# Patient Record
Sex: MALE | Race: WHITE | NOT HISPANIC OR LATINO | URBAN - METROPOLITAN AREA
[De-identification: names, ages, dates, MRNs, and addresses within clinical notes are randomized per-mention and may not be internally consistent; named-entity substitution may affect disease eponyms.]

---

## 2019-05-02 ENCOUNTER — OUTPATIENT (OUTPATIENT)
Dept: OUTPATIENT SERVICES | Facility: HOSPITAL | Age: 84
LOS: 1 days | Discharge: HOME | End: 2019-05-02

## 2019-05-02 ENCOUNTER — APPOINTMENT (OUTPATIENT)
Dept: CARDIOLOGY | Facility: CLINIC | Age: 84
End: 2019-05-02
Payer: MEDICARE

## 2019-05-02 ENCOUNTER — LABORATORY RESULT (OUTPATIENT)
Age: 84
End: 2019-05-02

## 2019-05-02 DIAGNOSIS — Z79.01 LONG TERM (CURRENT) USE OF ANTICOAGULANTS: ICD-10-CM

## 2019-05-02 DIAGNOSIS — Z01.810 ENCOUNTER FOR PREPROCEDURAL CARDIOVASCULAR EXAMINATION: ICD-10-CM

## 2019-05-02 DIAGNOSIS — I25.10 ATHEROSCLEROTIC HEART DISEASE OF NATIVE CORONARY ARTERY WITHOUT ANGINA PECTORIS: ICD-10-CM

## 2019-05-02 PROCEDURE — 93000 ELECTROCARDIOGRAM COMPLETE: CPT

## 2019-05-02 PROCEDURE — 99214 OFFICE O/P EST MOD 30 MIN: CPT

## 2019-05-28 ENCOUNTER — LABORATORY RESULT (OUTPATIENT)
Age: 84
End: 2019-05-28

## 2019-05-28 ENCOUNTER — OUTPATIENT (OUTPATIENT)
Dept: OUTPATIENT SERVICES | Facility: HOSPITAL | Age: 84
LOS: 1 days | Discharge: HOME | End: 2019-05-28

## 2019-05-28 DIAGNOSIS — Z01.810 ENCOUNTER FOR PREPROCEDURAL CARDIOVASCULAR EXAMINATION: ICD-10-CM

## 2019-05-28 DIAGNOSIS — E79.0 HYPERURICEMIA WITHOUT SIGNS OF INFLAMMATORY ARTHRITIS AND TOPHACEOUS DISEASE: ICD-10-CM

## 2019-05-28 DIAGNOSIS — I25.10 ATHEROSCLEROTIC HEART DISEASE OF NATIVE CORONARY ARTERY WITHOUT ANGINA PECTORIS: ICD-10-CM

## 2019-06-13 ENCOUNTER — LABORATORY RESULT (OUTPATIENT)
Age: 84
End: 2019-06-13

## 2019-06-13 ENCOUNTER — APPOINTMENT (OUTPATIENT)
Dept: CARDIOLOGY | Facility: CLINIC | Age: 84
End: 2019-06-13
Payer: MEDICARE

## 2019-06-13 ENCOUNTER — OUTPATIENT (OUTPATIENT)
Dept: OUTPATIENT SERVICES | Facility: HOSPITAL | Age: 84
LOS: 1 days | Discharge: HOME | End: 2019-06-13

## 2019-06-13 DIAGNOSIS — Z01.810 ENCOUNTER FOR PREPROCEDURAL CARDIOVASCULAR EXAMINATION: ICD-10-CM

## 2019-06-13 DIAGNOSIS — I25.10 ATHEROSCLEROTIC HEART DISEASE OF NATIVE CORONARY ARTERY WITHOUT ANGINA PECTORIS: ICD-10-CM

## 2019-06-13 PROCEDURE — 99214 OFFICE O/P EST MOD 30 MIN: CPT | Mod: 25

## 2019-06-13 PROCEDURE — 93306 TTE W/DOPPLER COMPLETE: CPT | Mod: 59

## 2019-06-13 PROCEDURE — 93000 ELECTROCARDIOGRAM COMPLETE: CPT | Mod: 59

## 2019-06-14 ENCOUNTER — EMERGENCY (EMERGENCY)
Facility: HOSPITAL | Age: 84
LOS: 0 days | Discharge: HOME | End: 2019-06-14
Attending: EMERGENCY MEDICINE | Admitting: EMERGENCY MEDICINE
Payer: MEDICARE

## 2019-06-14 VITALS
SYSTOLIC BLOOD PRESSURE: 137 MMHG | DIASTOLIC BLOOD PRESSURE: 61 MMHG | OXYGEN SATURATION: 98 % | TEMPERATURE: 98 F | RESPIRATION RATE: 17 BRPM | HEART RATE: 60 BPM

## 2019-06-14 VITALS
DIASTOLIC BLOOD PRESSURE: 59 MMHG | RESPIRATION RATE: 18 BRPM | SYSTOLIC BLOOD PRESSURE: 123 MMHG | HEART RATE: 74 BPM | OXYGEN SATURATION: 98 % | TEMPERATURE: 97 F

## 2019-06-14 DIAGNOSIS — R33.9 RETENTION OF URINE, UNSPECIFIED: ICD-10-CM
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
## 2019-06-14 DIAGNOSIS — T83.098A OTHER MECHANICAL COMPLICATION OF OTHER URINARY CATHETER, INITIAL ENCOUNTER: ICD-10-CM
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
## 2019-06-14 LAB
APPEARANCE UR: ABNORMAL
BILIRUB UR-MCNC: NEGATIVE — SIGNIFICANT CHANGE UP
COLOR SPEC: YELLOW — SIGNIFICANT CHANGE UP
DIFF PNL FLD: ABNORMAL
GLUCOSE UR QL: 100 MG/DL
KETONES UR-MCNC: NEGATIVE — SIGNIFICANT CHANGE UP
LEUKOCYTE ESTERASE UR-ACNC: NEGATIVE — SIGNIFICANT CHANGE UP
NITRITE UR-MCNC: NEGATIVE — SIGNIFICANT CHANGE UP
PH UR: 5.5 — SIGNIFICANT CHANGE UP (ref 5–8)
PROT UR-MCNC: NEGATIVE MG/DL — SIGNIFICANT CHANGE UP
RBC CASTS # UR COMP ASSIST: >50 /HPF
SP GR SPEC: 1.01 — SIGNIFICANT CHANGE UP (ref 1.01–1.03)
URATE CRY FLD QL MICRO: ABNORMAL /HPF
UROBILINOGEN FLD QL: 0.2 MG/DL — SIGNIFICANT CHANGE UP (ref 0.2–0.2)

## 2019-06-14 PROCEDURE — 99283 EMERGENCY DEPT VISIT LOW MDM: CPT | Mod: GC

## 2019-06-14 NOTE — ED PROVIDER NOTE - ATTENDING CONTRIBUTION TO CARE
Pt is a 89yo male with decreased urine output and increased suprapubic discomfort since last night.  Keller placed 1 week ago after TAVR.  No fever, no CP/SOB.  No other complaints.    Exam: osft NT abdomen after keller replaced, bruising in groin, RRR< CTAB, NAD  Imp: keller catheter malfunciton  Plan: replace keller, ua/ucx

## 2019-06-14 NOTE — ED PROVIDER NOTE - PHYSICAL EXAMINATION
Vital Signs: I have reviewed the initial vital signs.  Constitutional: NAD, well-nourished, appears stated age, no acute distress.  HEENT: Airway patent, moist MM, no erythema/swelling/deformity of oral structures. EOMI, PERRLA.  CV: regular rate, regular rhythm, well-perfused extremities, 2+ b/l DP and radial pulses equal.  Lungs: BCTA, no increased WOB.  ABD: suprapubic voluntary guarding, otherwise NTND, no guarding or rebound, no pulsatile mass, no hernias.   MSK: Neck supple, nontender, nl ROM, no stepoff. Chest nontender. Back nontender in TLS spine or to b/l bony structures or flanks. no cva ttp. Ext nontender, nl rom, no deformity.   INTEG: Skin warm, dry, no rash.  NEURO: A&Ox3, moving all extremities, normal speech  PSYCH: Calm, cooperative, normal affect and interaction.

## 2019-06-14 NOTE — ED PROVIDER NOTE - OBJECTIVE STATEMENT
89yM presents s/p TAVR last week c/by urinary retention with indwelling keller placed and retained prior to discharge. for the past 3 days pt has had progressive dysuria, suprapubic aching discomfort, and has had decreased output from the keller today. pain is constant nonradiating gradual onset, worse with attempting to void. no fever chills nausea back pain penile discharge hematuria. wife is acting as  per her request.

## 2019-06-14 NOTE — ED ADULT NURSE NOTE - OBJECTIVE STATEMENT
Patient present to ED BIBA with c/o leaking keller catheter and minimal output since yesterday. As per aide at bedside patient was discharged home saturday night and since then keller has had dark concentrated minimal output with leakage. Patient c/o lower pelvic pain on [ain scale 5/10 pressure in sensation. Old catheter removed, new 16F placed with 300 cc of yellow output.

## 2019-06-14 NOTE — ED ADULT NURSE NOTE - CHPI ED NUR SYMPTOMS NEG
no dysuria/no hematuria/no blood in stool/no chills/no abdominal distension/no nausea/no diarrhea/no fever

## 2019-06-14 NOTE — ED PROVIDER NOTE - NSFOLLOWUPINSTRUCTIONS_ED_ALL_ED_FT
Urinary Retention    Urinary retention is the inability to completely empty your bladder. This is a common problem in older men, especially with enlarged prostates. If you are sent home with a keller catheter and a drainage system make sure to keep the drainage bag emptied and lower than your catheter. Keep the keller catheter in until you follow up with a urologist.    SEEK IMMEDIATE MEDICAL CARE IF YOU DEVELOP THE FOLLOWING SYMPTOMS: the catheter stops draining urine, the catheter falls out, abdominal pain, nausea/vomiting, or chills/fever.

## 2019-06-15 LAB
CULTURE RESULTS: NO GROWTH — SIGNIFICANT CHANGE UP
SPECIMEN SOURCE: SIGNIFICANT CHANGE UP

## 2019-06-18 ENCOUNTER — APPOINTMENT (OUTPATIENT)
Dept: CARDIOLOGY | Facility: CLINIC | Age: 84
End: 2019-06-18
Payer: MEDICARE

## 2019-06-18 ENCOUNTER — OUTPATIENT (OUTPATIENT)
Dept: OUTPATIENT SERVICES | Facility: HOSPITAL | Age: 84
LOS: 1 days | Discharge: HOME | End: 2019-06-18
Payer: MEDICARE

## 2019-06-18 DIAGNOSIS — R07.9 CHEST PAIN, UNSPECIFIED: ICD-10-CM

## 2019-06-18 DIAGNOSIS — R06.02 SHORTNESS OF BREATH: ICD-10-CM

## 2019-06-18 PROCEDURE — 93000 ELECTROCARDIOGRAM COMPLETE: CPT

## 2019-06-18 PROCEDURE — 71046 X-RAY EXAM CHEST 2 VIEWS: CPT | Mod: 26

## 2019-06-18 PROCEDURE — 99214 OFFICE O/P EST MOD 30 MIN: CPT

## 2019-06-28 ENCOUNTER — APPOINTMENT (OUTPATIENT)
Dept: CARDIOLOGY | Facility: CLINIC | Age: 84
End: 2019-06-28
Payer: MEDICARE

## 2019-06-28 PROCEDURE — 99214 OFFICE O/P EST MOD 30 MIN: CPT

## 2019-06-28 PROCEDURE — 93000 ELECTROCARDIOGRAM COMPLETE: CPT

## 2019-07-24 ENCOUNTER — APPOINTMENT (OUTPATIENT)
Dept: CARDIOLOGY | Facility: CLINIC | Age: 84
End: 2019-07-24
Payer: MEDICARE

## 2019-07-24 PROCEDURE — 99214 OFFICE O/P EST MOD 30 MIN: CPT | Mod: 25

## 2019-07-24 PROCEDURE — 93000 ELECTROCARDIOGRAM COMPLETE: CPT

## 2019-09-11 ENCOUNTER — APPOINTMENT (OUTPATIENT)
Dept: CARDIOLOGY | Facility: CLINIC | Age: 84
End: 2019-09-11

## 2019-09-18 ENCOUNTER — APPOINTMENT (OUTPATIENT)
Dept: CARDIOLOGY | Facility: CLINIC | Age: 84
End: 2019-09-18
Payer: MEDICARE

## 2019-09-18 PROCEDURE — 99214 OFFICE O/P EST MOD 30 MIN: CPT | Mod: 25

## 2019-09-18 PROCEDURE — 93000 ELECTROCARDIOGRAM COMPLETE: CPT | Mod: 59

## 2019-09-18 PROCEDURE — 93280 PM DEVICE PROGR EVAL DUAL: CPT | Mod: 59

## 2019-12-17 ENCOUNTER — APPOINTMENT (OUTPATIENT)
Dept: CARDIOLOGY | Facility: CLINIC | Age: 84
End: 2019-12-17
Payer: MEDICARE

## 2019-12-17 PROCEDURE — 99214 OFFICE O/P EST MOD 30 MIN: CPT

## 2019-12-17 PROCEDURE — 93000 ELECTROCARDIOGRAM COMPLETE: CPT

## 2020-01-22 ENCOUNTER — APPOINTMENT (OUTPATIENT)
Dept: HEMATOLOGY ONCOLOGY | Facility: CLINIC | Age: 85
End: 2020-01-22

## 2020-01-23 ENCOUNTER — APPOINTMENT (OUTPATIENT)
Dept: CARDIOLOGY | Facility: CLINIC | Age: 85
End: 2020-01-23
Payer: MEDICARE

## 2020-01-23 PROCEDURE — 93000 ELECTROCARDIOGRAM COMPLETE: CPT

## 2020-01-23 PROCEDURE — 99214 OFFICE O/P EST MOD 30 MIN: CPT

## 2020-03-17 ENCOUNTER — APPOINTMENT (OUTPATIENT)
Dept: CARDIOLOGY | Facility: CLINIC | Age: 85
End: 2020-03-17

## 2020-03-24 ENCOUNTER — APPOINTMENT (OUTPATIENT)
Dept: CARDIOLOGY | Facility: CLINIC | Age: 85
End: 2020-03-24

## 2020-07-31 ENCOUNTER — RECORD ABSTRACTING (OUTPATIENT)
Age: 85
End: 2020-07-31

## 2020-07-31 DIAGNOSIS — Z86.79 PERSONAL HISTORY OF OTHER DISEASES OF THE CIRCULATORY SYSTEM: ICD-10-CM

## 2020-07-31 DIAGNOSIS — Z86.39 PERSONAL HISTORY OF OTHER ENDOCRINE, NUTRITIONAL AND METABOLIC DISEASE: ICD-10-CM

## 2020-07-31 DIAGNOSIS — I25.10 ATHEROSCLEROTIC HEART DISEASE OF NATIVE CORONARY ARTERY W/OUT ANGINA PECTORIS: ICD-10-CM

## 2020-07-31 DIAGNOSIS — Z78.9 OTHER SPECIFIED HEALTH STATUS: ICD-10-CM

## 2020-07-31 DIAGNOSIS — Z92.241 PERSONAL HISTORY OF SYSTEMIC STEROID THERAPY: ICD-10-CM

## 2020-08-14 ENCOUNTER — APPOINTMENT (OUTPATIENT)
Dept: CARDIOLOGY | Facility: CLINIC | Age: 85
End: 2020-08-14
Payer: MEDICARE

## 2020-08-14 VITALS
DIASTOLIC BLOOD PRESSURE: 70 MMHG | BODY MASS INDEX: 27.4 KG/M2 | SYSTOLIC BLOOD PRESSURE: 126 MMHG | WEIGHT: 185 LBS | HEIGHT: 69 IN

## 2020-08-14 PROCEDURE — 99213 OFFICE O/P EST LOW 20 MIN: CPT

## 2020-08-14 PROCEDURE — 93280 PM DEVICE PROGR EVAL DUAL: CPT

## 2020-08-14 PROCEDURE — 93000 ELECTROCARDIOGRAM COMPLETE: CPT

## 2020-08-14 NOTE — PROCEDURE
[NSR] : normal sinus rhythm [Pacemaker] : pacemaker [No] : not [DDD] : DDD [Threshold Testing Performed] : Threshold testing was performed [Voltage: ___ volts] : Voltage was [unfilled] volts [Outputs/Safety Margin] : output changed to allow for adequate safety margin [Lead Imp:  ___ohms] : lead impedance was [unfilled] ohms [Counters Reset] : the counters were reset [Apace-Vsense ___ %] : Apace-Vsense [unfilled]% [de-identified] : [place] [de-identified] : No events

## 2020-08-16 NOTE — REVIEW OF SYSTEMS
[Negative] : Endocrine [Blurry Vision] : no blurred vision [Seeing Double (Diplopia)] : no diplopia [Skin: A Rash] : no rash: [Anxiety] : no anxiety [Easy Bruising] : no tendency for easy bruising

## 2020-08-16 NOTE — HISTORY OF PRESENT ILLNESS
[FreeTextEntry1] : H/o CAD, s/p CABG at [facility] in 2011. \par \par H/o DM, HTN, hyperlipidemia. \par \par Progressive AS, s/p TAVR at [place] in May 2019, followed by PPM placement.\par \par Patient denies CP, SOB. His walking is limited by knee pain.

## 2020-08-16 NOTE — PHYSICAL EXAM
[General Appearance - Well Developed] : well developed [Normal Appearance] : normal appearance [Well Groomed] : well groomed [General Appearance - Well Nourished] : well nourished [No Deformities] : no deformities [Normal Conjunctiva] : the conjunctiva exhibited no abnormalities [General Appearance - In No Acute Distress] : no acute distress [Eyelids - No Xanthelasma] : the eyelids demonstrated no xanthelasmas [Respiration, Rhythm And Depth] : normal respiratory rhythm and effort [Auscultation Breath Sounds / Voice Sounds] : lungs were clear to auscultation bilaterally [Exaggerated Use Of Accessory Muscles For Inspiration] : no accessory muscle use [No Precordial Heave] : no precordial heave was noted [Normal] : normal [Normal Rate] : normal [Rhythm Regular] : regular [Normal S1] : normal S1 [Normal S2] : normal S2 [S4] : an S4 was heard [II] : a grade 2 [2+] : left 2+ [No Pitting Edema] : no pitting edema present [Bowel Sounds] : normal bowel sounds [Abdomen Soft] : soft [Abdomen Tenderness] : non-tender [Abdomen Mass (___ Cm)] : no abdominal mass palpated [Cyanosis, Localized] : no localized cyanosis [] : no rash [Skin Color & Pigmentation] : normal skin color and pigmentation [Oriented To Time, Place, And Person] : oriented to person, place, and time [Affect] : the affect was normal [Mood] : the mood was normal [S3] : no S3

## 2020-08-16 NOTE — DISCUSSION/SUMMARY
[FreeTextEntry1] : 89-yo male, s/p CABG, s/p TAVR, s/p PPM. HTN is controlled  now. No angina on current therapy.\par \par Continue treatment.\par Repeat BW prior to next visit.\par EP f/u for PPM management.\par F/u in 4 months.\par \par Eleazar Nguyễn MD\par

## 2020-11-05 ENCOUNTER — APPOINTMENT (OUTPATIENT)
Dept: CARDIOLOGY | Facility: CLINIC | Age: 85
End: 2020-11-05
Payer: MEDICARE

## 2020-11-05 VITALS
SYSTOLIC BLOOD PRESSURE: 140 MMHG | WEIGHT: 186 LBS | HEART RATE: 60 BPM | TEMPERATURE: 97.7 F | HEIGHT: 69 IN | BODY MASS INDEX: 27.55 KG/M2 | DIASTOLIC BLOOD PRESSURE: 70 MMHG

## 2020-11-05 DIAGNOSIS — Z00.00 ENCOUNTER FOR GENERAL ADULT MEDICAL EXAMINATION W/OUT ABNORMAL FINDINGS: ICD-10-CM

## 2020-11-05 PROCEDURE — 93000 ELECTROCARDIOGRAM COMPLETE: CPT

## 2020-11-05 PROCEDURE — 99214 OFFICE O/P EST MOD 30 MIN: CPT

## 2020-11-05 NOTE — PHYSICAL EXAM
[General Appearance - Well Developed] : well developed [Normal Appearance] : normal appearance [Well Groomed] : well groomed [General Appearance - Well Nourished] : well nourished [No Deformities] : no deformities [General Appearance - In No Acute Distress] : no acute distress [Normal Conjunctiva] : the conjunctiva exhibited no abnormalities [Eyelids - No Xanthelasma] : the eyelids demonstrated no xanthelasmas [Respiration, Rhythm And Depth] : normal respiratory rhythm and effort [Exaggerated Use Of Accessory Muscles For Inspiration] : no accessory muscle use [Auscultation Breath Sounds / Voice Sounds] : lungs were clear to auscultation bilaterally [Normal] : normal [No Precordial Heave] : no precordial heave was noted [Normal Rate] : normal [Rhythm Regular] : regular [Normal S1] : normal S1 [Normal S2] : normal S2 [S3] : no S3 [S4] : an S4 was heard [II] : a grade 2 [2+] : left 2+ [No Pitting Edema] : no pitting edema present [Bowel Sounds] : normal bowel sounds [Abdomen Soft] : soft [Abdomen Tenderness] : non-tender [Abdomen Mass (___ Cm)] : no abdominal mass palpated [Cyanosis, Localized] : no localized cyanosis [Skin Color & Pigmentation] : normal skin color and pigmentation [] : no rash [Oriented To Time, Place, And Person] : oriented to person, place, and time [Affect] : the affect was normal [Mood] : the mood was normal

## 2020-11-05 NOTE — HISTORY OF PRESENT ILLNESS
[FreeTextEntry1] : H/o CAD, s/p CABG at [facility] in 2011. \par \par H/o DM, HTN, hyperlipidemia. \par \par Progressive AS, s/p TAVR at [place] in May 2019, followed by PPM placement.\par \par Patient has experienced several episodes of severe left-sided chest pain with activity in his apartment but not while walking outside (walks every day).

## 2020-11-05 NOTE — ASSESSMENT
[FreeTextEntry1] : CAD, s/p CABG, patent grafts prior to TAVR.\par Episodes of chest pain, unclear if cardiac.\par HTN.\par S/p TAVR.\par Diastolic dysfunction, appears euvolemic.\par \par Plan:\par Continue treatment.\par Patient advised to take SL NItro when he experiences CP. Will need ischemic w/u if responds to Nitro.\par Will schedule BW with PMD.\par F/u in 2 months.\par \par Eleazar Nguyễn MD\par

## 2020-11-05 NOTE — REVIEW OF SYSTEMS
[Blurry Vision] : no blurred vision [Seeing Double (Diplopia)] : no diplopia [see HPI] : see HPI [Lower Ext Edema] : no extremity edema [Leg Claudication] : no intermittent leg claudication [Palpitations] : no palpitations [Skin: A Rash] : no rash: [Anxiety] : no anxiety [Easy Bruising] : no tendency for easy bruising [Negative] : Endocrine

## 2020-11-12 ENCOUNTER — APPOINTMENT (OUTPATIENT)
Dept: CARDIOLOGY | Facility: CLINIC | Age: 85
End: 2020-11-12
Payer: MEDICARE

## 2020-11-12 VITALS
HEIGHT: 69 IN | SYSTOLIC BLOOD PRESSURE: 120 MMHG | BODY MASS INDEX: 27.55 KG/M2 | DIASTOLIC BLOOD PRESSURE: 68 MMHG | WEIGHT: 186 LBS

## 2020-11-12 PROCEDURE — 93280 PM DEVICE PROGR EVAL DUAL: CPT

## 2020-11-12 RX ORDER — RANITIDINE HYDROCHLORIDE 150 MG/1
150 CAPSULE ORAL
Refills: 0 | Status: COMPLETED | COMMUNITY
End: 2020-11-12

## 2020-11-12 RX ORDER — CARBIDOPA AND LEVODOPA 25; 100 MG/1; MG/1
25-100 TABLET ORAL
Qty: 90 | Refills: 0 | Status: COMPLETED | COMMUNITY
Start: 2020-04-06 | End: 2020-11-12

## 2020-11-12 RX ORDER — FUROSEMIDE 20 MG/1
20 TABLET ORAL DAILY
Qty: 90 | Refills: 1 | Status: COMPLETED | COMMUNITY
End: 2020-11-12

## 2020-11-12 RX ORDER — FAMOTIDINE 40 MG/1
40 TABLET, FILM COATED ORAL
Qty: 30 | Refills: 0 | Status: COMPLETED | COMMUNITY
Start: 2020-06-16 | End: 2020-11-12

## 2020-12-27 NOTE — HISTORY OF PRESENT ILLNESS
[FreeTextEntry1] : H/o CAD, s/p CABG at [facility] in 2011. \par \par H/o DM, HTN, hyperlipidemia. \par \par Progressive AS, s/p TAVR at [place] in May 2019, followed by PPM placement.\par \par Patient  without complains

## 2020-12-27 NOTE — PROCEDURE
[See Scanned Paceart Report] : See scanned paceart report [See Device Printout] : See device printout [Pacemaker] : pacemaker [Programmed for Longevity] : output reprogrammed for improved battery longevity [de-identified] : [employer] [place]

## 2021-01-08 ENCOUNTER — APPOINTMENT (OUTPATIENT)
Dept: CARDIOLOGY | Facility: CLINIC | Age: 86
End: 2021-01-08
Payer: MEDICARE

## 2021-01-08 VITALS
HEIGHT: 69 IN | SYSTOLIC BLOOD PRESSURE: 130 MMHG | BODY MASS INDEX: 28.14 KG/M2 | WEIGHT: 190 LBS | DIASTOLIC BLOOD PRESSURE: 78 MMHG

## 2021-01-08 PROCEDURE — 93000 ELECTROCARDIOGRAM COMPLETE: CPT

## 2021-01-08 PROCEDURE — 99213 OFFICE O/P EST LOW 20 MIN: CPT

## 2021-01-10 NOTE — REVIEW OF SYSTEMS
[Blurry Vision] : no blurred vision [Seeing Double (Diplopia)] : no diplopia [see HPI] : see HPI [Lower Ext Edema] : no extremity edema [Palpitations] : no palpitations [Leg Claudication] : no intermittent leg claudication [Skin: A Rash] : no rash: [Anxiety] : no anxiety [Easy Bruising] : no tendency for easy bruising [Negative] : Endocrine

## 2021-01-10 NOTE — HISTORY OF PRESENT ILLNESS
[FreeTextEntry1] : H/o CAD, s/p CABG at [facility] in 2011. \par \par H/o DM, HTN, hyperlipidemia. \par \par Progressive AS, s/p TAVR at [place] in May 2019, followed by PPM placement.\par \par Patient denies recent episodes of chest pain, dizziness. BP is occasionally elevated in the morning.

## 2021-01-10 NOTE — ASSESSMENT
[FreeTextEntry1] : CAD, s/p CABG, patent grafts prior to TAVR.\par HTN well controlled.\par S/p TAVR.\par Diastolic dysfunction, appears euvolemic.\par \par Plan:\par Continue treatment.\par Will schedule BW with PMD.\par F/u in 4 months.\par \par Eleazar Nguyễn MD\par

## 2021-01-10 NOTE — PHYSICAL EXAM
[General Appearance - Well Developed] : well developed [Normal Appearance] : normal appearance [Well Groomed] : well groomed [General Appearance - Well Nourished] : well nourished [No Deformities] : no deformities [General Appearance - In No Acute Distress] : no acute distress [Normal Conjunctiva] : the conjunctiva exhibited no abnormalities [Eyelids - No Xanthelasma] : the eyelids demonstrated no xanthelasmas [Respiration, Rhythm And Depth] : normal respiratory rhythm and effort [Auscultation Breath Sounds / Voice Sounds] : lungs were clear to auscultation bilaterally [Exaggerated Use Of Accessory Muscles For Inspiration] : no accessory muscle use [Normal] : normal [No Precordial Heave] : no precordial heave was noted [Normal Rate] : normal [Rhythm Regular] : regular [Normal S1] : normal S1 [Normal S2] : normal S2 [S3] : no S3 [S4] : an S4 was heard [II] : a grade 2 [2+] : left 2+ [No Pitting Edema] : no pitting edema present [Bowel Sounds] : normal bowel sounds [Abdomen Soft] : soft [Abdomen Tenderness] : non-tender [Abdomen Mass (___ Cm)] : no abdominal mass palpated [Cyanosis, Localized] : no localized cyanosis [Skin Color & Pigmentation] : normal skin color and pigmentation [] : no rash [Oriented To Time, Place, And Person] : oriented to person, place, and time [Affect] : the affect was normal [Mood] : the mood was normal

## 2021-01-28 ENCOUNTER — APPOINTMENT (OUTPATIENT)
Dept: CARDIOLOGY | Facility: CLINIC | Age: 86
End: 2021-01-28

## 2021-02-09 NOTE — PROCEDURE
[No] : not [NSR] : normal sinus rhythm [Pacemaker] : pacemaker [DDD] : DDD [Voltage: ___ volts] : Voltage was [unfilled] volts [Threshold Testing Performed] : Threshold testing was performed [Lead Imp:  ___ohms] : lead impedance was [unfilled] ohms [Outputs/Safety Margin] : output changed to allow for adequate safety margin [Counters Reset] : the counters were reset [Apace-Vsense ___ %] : Apace-Vsense [unfilled]% [de-identified] : [place] [de-identified] : No events

## 2021-02-10 ENCOUNTER — APPOINTMENT (OUTPATIENT)
Dept: CARDIOLOGY | Facility: CLINIC | Age: 86
End: 2021-02-10

## 2021-02-16 ENCOUNTER — APPOINTMENT (OUTPATIENT)
Dept: CARDIOLOGY | Facility: CLINIC | Age: 86
End: 2021-02-16
Payer: MEDICARE

## 2021-03-05 ENCOUNTER — APPOINTMENT (OUTPATIENT)
Dept: CARDIOLOGY | Facility: CLINIC | Age: 86
End: 2021-03-05
Payer: MEDICARE

## 2021-03-05 ENCOUNTER — NON-APPOINTMENT (OUTPATIENT)
Age: 86
End: 2021-03-05

## 2021-03-05 PROCEDURE — 93294 REM INTERROG EVL PM/LDLS PM: CPT

## 2021-03-05 PROCEDURE — 93296 REM INTERROG EVL PM/IDS: CPT

## 2021-05-06 ENCOUNTER — APPOINTMENT (OUTPATIENT)
Dept: CARDIOLOGY | Facility: CLINIC | Age: 86
End: 2021-05-06
Payer: MEDICARE

## 2021-05-06 VITALS
OXYGEN SATURATION: 98 % | DIASTOLIC BLOOD PRESSURE: 70 MMHG | TEMPERATURE: 97.8 F | HEIGHT: 69 IN | BODY MASS INDEX: 27.25 KG/M2 | HEART RATE: 61 BPM | SYSTOLIC BLOOD PRESSURE: 122 MMHG | WEIGHT: 184 LBS

## 2021-05-06 DIAGNOSIS — R06.00 DYSPNEA, UNSPECIFIED: ICD-10-CM

## 2021-05-06 PROCEDURE — 93000 ELECTROCARDIOGRAM COMPLETE: CPT | Mod: 59

## 2021-05-06 PROCEDURE — 93280 PM DEVICE PROGR EVAL DUAL: CPT

## 2021-05-06 PROCEDURE — 99213 OFFICE O/P EST LOW 20 MIN: CPT | Mod: 25

## 2021-05-06 RX ORDER — AMLODIPINE BESYLATE 5 MG/1
5 TABLET ORAL
Qty: 90 | Refills: 1 | Status: DISCONTINUED | COMMUNITY
Start: 2021-01-08 | End: 2021-05-06

## 2021-05-06 NOTE — HISTORY OF PRESENT ILLNESS
[FreeTextEntry1] : H/o CAD, s/p CABG at [facility] in 2011. \par \par H/o DM, HTN, hyperlipidemia. \par \par Progressive AS, s/p TAVR at [place] in May 2019, followed by PPM placement.\par \par Patient c/o GARCIA since TAVR implant.

## 2021-05-06 NOTE — PROCEDURE
[No] : not [NSR] : normal sinus rhythm [Pacemaker] : pacemaker [DDD] : DDD [Voltage: ___ volts] : Voltage was [unfilled] volts [Magnet Rate: ___ Ppm] : magnet rate was [unfilled] Ppm [Normal] : The battery status is normal. [Lead Imp:  ___ohms] : lead impedance was [unfilled] ohms [Sensing Amplitude ___mv] : sensing amplitude was [unfilled] mv [___V @] : [unfilled] V [___ ms] : [unfilled] ms [Asense-Vpace ___ %] : Asense-Vpace [unfilled]% [Apace-Vsense ___ %] : Apace-Vsense [unfilled]% [de-identified] : [place] [de-identified] : [id number] [de-identified] : [id number] [de-identified] : 6/4/2019 [de-identified] : 60/130 [de-identified] : 9.2-10.5 years [de-identified] : Rate response turned on [de-identified] : No new episodes

## 2021-05-06 NOTE — PHYSICAL EXAM
[General Appearance - Well Developed] : well developed [Normal Appearance] : normal appearance [Well Groomed] : well groomed [General Appearance - Well Nourished] : well nourished [No Deformities] : no deformities [General Appearance - In No Acute Distress] : no acute distress [Heart Rate And Rhythm] : heart rate and rhythm were normal [Heart Sounds] : normal S1 and S2 [Murmurs] : no murmurs present [] : no respiratory distress [Respiration, Rhythm And Depth] : normal respiratory rhythm and effort [Exaggerated Use Of Accessory Muscles For Inspiration] : no accessory muscle use [Auscultation Breath Sounds / Voice Sounds] : lungs were clear to auscultation bilaterally [Left Infraclavicular] : left infraclavicular area [Well-Healed] : well-healed [Abdomen Soft] : soft [Nail Clubbing] : no clubbing of the fingernails [Cyanosis, Localized] : no localized cyanosis [FreeTextEntry1] : Mechanical valve ausculated

## 2021-05-06 NOTE — ASSESSMENT
[FreeTextEntry1] : DC PPM interrogation\par - Device function normal. Parameters stable. \par - Patient c/o GARCIA, heart rate histograms reveal HR 60, A pacing 79%, mode changed from DDD to DDDR\par - Patient enrolled in remote monitoring and transmitting\par \par

## 2021-05-13 ENCOUNTER — APPOINTMENT (OUTPATIENT)
Dept: CARDIOLOGY | Facility: CLINIC | Age: 86
End: 2021-05-13
Payer: MEDICARE

## 2021-05-13 ENCOUNTER — RESULT CHARGE (OUTPATIENT)
Age: 86
End: 2021-05-13

## 2021-05-13 VITALS
WEIGHT: 186 LBS | BODY MASS INDEX: 27.55 KG/M2 | SYSTOLIC BLOOD PRESSURE: 130 MMHG | DIASTOLIC BLOOD PRESSURE: 74 MMHG | HEIGHT: 69 IN

## 2021-05-13 PROCEDURE — 99213 OFFICE O/P EST LOW 20 MIN: CPT

## 2021-05-13 PROCEDURE — 93000 ELECTROCARDIOGRAM COMPLETE: CPT

## 2021-05-13 NOTE — ASSESSMENT
[FreeTextEntry1] : CAD, s/p CABG, patent grafts prior to TAVR.\par HTN well controlled.\par S/p TAVR. Increased FRANKO at the AV now.\par Diastolic dysfunction, appears euvolemic.\par Hyperlipidemia.\par \par Plan:\par Continue treatment.\par Repeat 2D ECHO.\par BW from PMD.\par F/u in 4 months.\par \par Eleazar Nguyễn MD\par

## 2021-05-13 NOTE — REVIEW OF SYSTEMS
[Lower Ext Edema] : no extremity edema [Leg Claudication] : no intermittent leg claudication [Palpitations] : no palpitations [Syncope] : no syncope [Rash] : no rash [Anxiety] : no anxiety [Easy Bleeding] : no tendency for easy bleeding [Easy Bruising] : no tendency for easy bruising [Negative] : Neurological

## 2021-05-13 NOTE — PHYSICAL EXAM
[Well Developed] : well developed [Well Nourished] : well nourished [No Acute Distress] : no acute distress [Normal Conjunctiva] : normal conjunctiva [Normal Venous Pressure] : normal venous pressure [No Carotid Bruit] : no carotid bruit [Normal Rate] : normal [Rhythm Regular] : regular [Normal S1] : normal S1 [Normal S2] : normal S2 [S3] : no S3 [S4] : an S4 was heard [III] : a grade 3 [Crescendo-Decrescendo] : crescendo-decrescendo [No Pitting Edema] : no pitting edema present [Clear Lung Fields] : clear lung fields [Good Air Entry] : good air entry [No Respiratory Distress] : no respiratory distress  [Soft] : abdomen soft [Non Tender] : non-tender [Normal Bowel Sounds] : normal bowel sounds [Normal Gait] : normal gait [No Edema] : no edema [No Cyanosis] : no cyanosis [No Clubbing] : no clubbing [No Varicosities] : no varicosities [No Rash] : no rash [Moves all extremities] : moves all extremities [No Focal Deficits] : no focal deficits [Normal Speech] : normal speech [Alert and Oriented] : alert and oriented [Normal memory] : normal memory

## 2021-05-13 NOTE — HISTORY OF PRESENT ILLNESS
[FreeTextEntry1] : H/o CAD, s/p CABG at [facility] in 2011. \par \par H/o DM, HTN, hyperlipidemia. \par \par Progressive AS, s/p TAVR at [place] in May 2019, followed by PPM placement.\par \par Patient still c/o chest pain with some house work but he is able to walk outside without difficulty\par \par BP has been better controlled.

## 2021-05-28 ENCOUNTER — APPOINTMENT (OUTPATIENT)
Dept: CARDIOLOGY | Facility: CLINIC | Age: 86
End: 2021-05-28

## 2021-06-15 ENCOUNTER — APPOINTMENT (OUTPATIENT)
Dept: CARDIOLOGY | Facility: CLINIC | Age: 86
End: 2021-06-15
Payer: MEDICARE

## 2021-06-15 PROCEDURE — 93306 TTE W/DOPPLER COMPLETE: CPT

## 2021-08-06 ENCOUNTER — APPOINTMENT (OUTPATIENT)
Dept: CARDIOLOGY | Facility: CLINIC | Age: 86
End: 2021-08-06
Payer: MEDICARE

## 2021-08-06 ENCOUNTER — NON-APPOINTMENT (OUTPATIENT)
Age: 86
End: 2021-08-06

## 2021-08-06 PROCEDURE — 93296 REM INTERROG EVL PM/IDS: CPT

## 2021-08-06 PROCEDURE — 93294 REM INTERROG EVL PM/LDLS PM: CPT

## 2021-08-12 ENCOUNTER — RESULT CHARGE (OUTPATIENT)
Age: 86
End: 2021-08-12

## 2021-08-12 ENCOUNTER — APPOINTMENT (OUTPATIENT)
Dept: CARDIOLOGY | Facility: CLINIC | Age: 86
End: 2021-08-12
Payer: MEDICARE

## 2021-08-12 VITALS
DIASTOLIC BLOOD PRESSURE: 70 MMHG | RESPIRATION RATE: 16 BRPM | SYSTOLIC BLOOD PRESSURE: 134 MMHG | OXYGEN SATURATION: 98 % | BODY MASS INDEX: 27.7 KG/M2 | WEIGHT: 187 LBS | TEMPERATURE: 97.8 F | HEART RATE: 60 BPM | HEIGHT: 69 IN

## 2021-08-12 PROCEDURE — 93000 ELECTROCARDIOGRAM COMPLETE: CPT

## 2021-08-12 PROCEDURE — 99214 OFFICE O/P EST MOD 30 MIN: CPT

## 2021-08-12 NOTE — PHYSICAL EXAM
[Well Developed] : well developed [Well Nourished] : well nourished [No Acute Distress] : no acute distress [Normal Conjunctiva] : normal conjunctiva [Normal Venous Pressure] : normal venous pressure [No Carotid Bruit] : no carotid bruit [Normal Rate] : normal [Rhythm Regular] : regular [Normal S1] : normal S1 [Normal S2] : normal S2 [S4] : an S4 was heard [III] : a grade 3 [Crescendo-Decrescendo] : crescendo-decrescendo [No Pitting Edema] : no pitting edema present [Clear Lung Fields] : clear lung fields [Good Air Entry] : good air entry [No Respiratory Distress] : no respiratory distress  [Soft] : abdomen soft [Non Tender] : non-tender [Normal Bowel Sounds] : normal bowel sounds [Normal Gait] : normal gait [No Edema] : no edema [No Cyanosis] : no cyanosis [No Clubbing] : no clubbing [No Varicosities] : no varicosities [No Rash] : no rash [Moves all extremities] : moves all extremities [No Focal Deficits] : no focal deficits [Normal Speech] : normal speech [Alert and Oriented] : alert and oriented [Normal memory] : normal memory [S3] : no S3

## 2021-08-12 NOTE — ASSESSMENT
[FreeTextEntry1] : CAD, s/p CABG, patent grafts prior to TAVR.\par HTN uncontrolled again due to running out of Amlodipine..\par S/p TAVR. Increased FRANKO at the AV now.\par Diastolic dysfunction, appears euvolemic.\par Hyperlipidemia.\par Dizziness, headache, ? side effect of Ranexa.\par \par Plan:\par Resume Amlodipine.\par Stop Ranexa for now.\par BW pending.\par F/u in 3 months.\par \par Eleazar Nguyễn MD\par

## 2021-08-12 NOTE — HISTORY OF PRESENT ILLNESS
[FreeTextEntry1] : H/o CAD, s/p CABG at [facility] in 2011. \par \par H/o DM, HTN, hyperlipidemia. \par \par Progressive AS, s/p TAVR at [place] in May 2019, followed by PPM placement.\par \par Patient denies CP now.\par \par BP has been uncontrolled again. He c/o frequent headache and dizziness.

## 2021-08-12 NOTE — CARDIOLOGY SUMMARY
[de-identified] : 08/12/21:\par SR, 1st degree AVB, LBBB. [de-identified] : 06/15/21:\par LVEF 64%, G2DD\par S/p TAVR, normal function, PG 23/16\par Mild MR, TR.

## 2021-08-12 NOTE — REVIEW OF SYSTEMS
[Negative] : Gastrointestinal [Headache] : headache [Lower Ext Edema] : no extremity edema [Leg Claudication] : no intermittent leg claudication [Palpitations] : no palpitations [Syncope] : no syncope [Rash] : no rash [Dizziness] : dizziness [Tremor] : a tremor was seen [Numbness (Hypoesthesia)] : no numbness [Speech Disturbance] : no speech disturbance [Anxiety] : no anxiety [Easy Bleeding] : no tendency for easy bleeding [Easy Bruising] : no tendency for easy bruising

## 2021-11-05 ENCOUNTER — APPOINTMENT (OUTPATIENT)
Dept: CARDIOLOGY | Facility: CLINIC | Age: 86
End: 2021-11-05
Payer: MEDICARE

## 2021-11-05 ENCOUNTER — NON-APPOINTMENT (OUTPATIENT)
Age: 86
End: 2021-11-05

## 2021-11-05 PROCEDURE — 93294 REM INTERROG EVL PM/LDLS PM: CPT

## 2021-11-05 PROCEDURE — 93296 REM INTERROG EVL PM/IDS: CPT

## 2021-12-09 ENCOUNTER — APPOINTMENT (OUTPATIENT)
Dept: CARDIOLOGY | Facility: CLINIC | Age: 86
End: 2021-12-09
Payer: MEDICARE

## 2021-12-09 VITALS
HEIGHT: 69 IN | BODY MASS INDEX: 26.36 KG/M2 | SYSTOLIC BLOOD PRESSURE: 122 MMHG | DIASTOLIC BLOOD PRESSURE: 70 MMHG | WEIGHT: 178 LBS

## 2021-12-09 PROCEDURE — 93000 ELECTROCARDIOGRAM COMPLETE: CPT

## 2021-12-09 PROCEDURE — 99214 OFFICE O/P EST MOD 30 MIN: CPT

## 2021-12-09 RX ORDER — RANOLAZINE 500 MG/1
500 TABLET, FILM COATED, EXTENDED RELEASE ORAL
Qty: 180 | Refills: 1 | Status: DISCONTINUED | COMMUNITY
End: 2021-12-09

## 2021-12-09 NOTE — HISTORY OF PRESENT ILLNESS
[FreeTextEntry1] : H/o CAD, s/p CABG at [facility] in 2011. \par \par H/o DM, HTN, hyperlipidemia. \par \par Progressive AS, s/p TAVR at [place] in May 2019, followed by PPM placement.\par \par Patient denies CP now.\par \par BP has been controlled but patient feels weak and dizzy.

## 2021-12-09 NOTE — REVIEW OF SYSTEMS
[Headache] : headache [Dizziness] : dizziness [Tremor] : a tremor was seen [Negative] : Gastrointestinal [Lower Ext Edema] : no extremity edema [Leg Claudication] : no intermittent leg claudication [Palpitations] : no palpitations [Syncope] : no syncope [Rash] : no rash [Numbness (Hypoesthesia)] : no numbness [Speech Disturbance] : no speech disturbance [Anxiety] : no anxiety [Easy Bleeding] : no tendency for easy bleeding [Easy Bruising] : no tendency for easy bruising

## 2021-12-09 NOTE — CARDIOLOGY SUMMARY
[de-identified] : 12/08/21:\par Atrial pacing 60/min, 1st degree AVB, LBBB. [de-identified] : 06/15/21:\par LVEF 64%, G2DD\par S/p TAVR, normal function, PG 23/16\par Mild MR, TR.

## 2021-12-10 ENCOUNTER — RESULT CHARGE (OUTPATIENT)
Age: 86
End: 2021-12-10

## 2022-02-03 ENCOUNTER — APPOINTMENT (OUTPATIENT)
Dept: CARDIOLOGY | Facility: CLINIC | Age: 87
End: 2022-02-03
Payer: MEDICARE

## 2022-02-03 VITALS
TEMPERATURE: 97.2 F | HEIGHT: 69 IN | BODY MASS INDEX: 26.36 KG/M2 | DIASTOLIC BLOOD PRESSURE: 77 MMHG | SYSTOLIC BLOOD PRESSURE: 120 MMHG | WEIGHT: 178 LBS

## 2022-02-03 PROCEDURE — 93280 PM DEVICE PROGR EVAL DUAL: CPT

## 2022-02-03 RX ORDER — HYDROCHLOROTHIAZIDE 12.5 MG/1
12.5 TABLET ORAL DAILY
Qty: 90 | Refills: 1 | Status: COMPLETED | COMMUNITY
Start: 2021-12-09 | End: 2022-02-03

## 2022-02-03 RX ORDER — DOCUSATE SODIUM 100 MG/1
100 CAPSULE, LIQUID FILLED ORAL
Refills: 0 | Status: COMPLETED | COMMUNITY
End: 2022-02-03

## 2022-02-03 RX ORDER — MECLIZINE HYDROCHLORIDE 25 MG/1
25 TABLET ORAL
Qty: 30 | Refills: 0 | Status: COMPLETED | COMMUNITY
Start: 2020-07-27 | End: 2022-02-03

## 2022-02-03 RX ORDER — SITAGLIPTIN AND METFORMIN HYDROCHLORIDE 50; 500 MG/1; MG/1
50-500 TABLET, FILM COATED ORAL TWICE DAILY
Refills: 0 | Status: COMPLETED | COMMUNITY
End: 2022-02-03

## 2022-02-03 RX ORDER — BISACODYL 5 MG/1
5 TABLET ORAL
Refills: 0 | Status: COMPLETED | COMMUNITY
End: 2022-02-03

## 2022-02-04 ENCOUNTER — NON-APPOINTMENT (OUTPATIENT)
Age: 87
End: 2022-02-04

## 2022-02-04 ENCOUNTER — APPOINTMENT (OUTPATIENT)
Dept: CARDIOLOGY | Facility: CLINIC | Age: 87
End: 2022-02-04
Payer: MEDICARE

## 2022-02-04 PROCEDURE — 93296 REM INTERROG EVL PM/IDS: CPT

## 2022-02-04 PROCEDURE — 93294 REM INTERROG EVL PM/LDLS PM: CPT

## 2022-03-31 ENCOUNTER — APPOINTMENT (OUTPATIENT)
Dept: CARDIOLOGY | Facility: CLINIC | Age: 87
End: 2022-03-31
Payer: MEDICARE

## 2022-03-31 ENCOUNTER — RESULT CHARGE (OUTPATIENT)
Age: 87
End: 2022-03-31

## 2022-03-31 VITALS
BODY MASS INDEX: 24.88 KG/M2 | HEART RATE: 60 BPM | HEIGHT: 69 IN | SYSTOLIC BLOOD PRESSURE: 120 MMHG | DIASTOLIC BLOOD PRESSURE: 64 MMHG | WEIGHT: 168 LBS

## 2022-03-31 PROCEDURE — 99214 OFFICE O/P EST MOD 30 MIN: CPT

## 2022-03-31 PROCEDURE — 93000 ELECTROCARDIOGRAM COMPLETE: CPT

## 2022-03-31 RX ORDER — TRIAMTERENE AND HYDROCHLOROTHIAZIDE 25; 37.5 MG/1; MG/1
37.5-25 TABLET ORAL
Refills: 0 | Status: DISCONTINUED | COMMUNITY
End: 2022-03-31

## 2022-03-31 RX ORDER — ISOPROPYL ALCOHOL 70 ML/100ML
70 SWAB TOPICAL
Qty: 100 | Refills: 0 | Status: DISCONTINUED | COMMUNITY
Start: 2020-04-18 | End: 2022-03-31

## 2022-03-31 NOTE — HISTORY OF PRESENT ILLNESS
[FreeTextEntry1] : H/o CAD, s/p CABG at [facility] in 2011. \par \par H/o DM, HTN, hyperlipidemia. \par \par Progressive AS, s/p TAVR at [place] in May 2019, followed by PPM placement.\par \par Patient c/o tightness and pain across his chest when he moves around the house or takes long walks.

## 2022-03-31 NOTE — CARDIOLOGY SUMMARY
[de-identified] : 03/31/22:\par Atrial pacing 60/min, 1st degree AVB, LBBB. [de-identified] : 06/15/21:\par LVEF 64%, G2DD\par S/p TAVR, normal function, PG 23/16\par Mild MR, TR.

## 2022-03-31 NOTE — ASSESSMENT
[FreeTextEntry1] : CAD, s/p CABG, patent grafts prior to TAVR. CP c/w angina.\par HTN controlled now.\par S/p TAVR. \par Diastolic dysfunction, appears euvolemic.\par Hyperlipidemia.\par \par \par Plan:\par Increase Imdur to 120 mg daily.\par Continue the rest of the medications.\par BW pending.\par F/u in 4 months.\par \par Eleazar Nguyễn MD\par

## 2022-04-04 ENCOUNTER — EMERGENCY (EMERGENCY)
Facility: HOSPITAL | Age: 87
LOS: 0 days | Discharge: HOME | End: 2022-04-04
Attending: EMERGENCY MEDICINE | Admitting: EMERGENCY MEDICINE
Payer: MEDICARE

## 2022-04-04 VITALS
DIASTOLIC BLOOD PRESSURE: 81 MMHG | OXYGEN SATURATION: 98 % | RESPIRATION RATE: 18 BRPM | TEMPERATURE: 97 F | HEART RATE: 60 BPM | SYSTOLIC BLOOD PRESSURE: 170 MMHG

## 2022-04-04 VITALS — SYSTOLIC BLOOD PRESSURE: 165 MMHG | DIASTOLIC BLOOD PRESSURE: 72 MMHG | HEART RATE: 63 BPM

## 2022-04-04 DIAGNOSIS — I10 ESSENTIAL (PRIMARY) HYPERTENSION: ICD-10-CM

## 2022-04-04 DIAGNOSIS — R51.9 HEADACHE, UNSPECIFIED: ICD-10-CM

## 2022-04-04 DIAGNOSIS — Z95.1 PRESENCE OF AORTOCORONARY BYPASS GRAFT: ICD-10-CM

## 2022-04-04 DIAGNOSIS — Z95.0 PRESENCE OF CARDIAC PACEMAKER: ICD-10-CM

## 2022-04-04 DIAGNOSIS — E11.9 TYPE 2 DIABETES MELLITUS WITHOUT COMPLICATIONS: ICD-10-CM

## 2022-04-04 LAB
ALBUMIN SERPL ELPH-MCNC: 4.5 G/DL — SIGNIFICANT CHANGE UP (ref 3.5–5.2)
ALP SERPL-CCNC: 69 U/L — SIGNIFICANT CHANGE UP (ref 30–115)
ALT FLD-CCNC: 13 U/L — SIGNIFICANT CHANGE UP (ref 0–41)
ANION GAP SERPL CALC-SCNC: 13 MMOL/L — SIGNIFICANT CHANGE UP (ref 7–14)
APPEARANCE UR: CLEAR — SIGNIFICANT CHANGE UP
AST SERPL-CCNC: 16 U/L — SIGNIFICANT CHANGE UP (ref 0–41)
BILIRUB SERPL-MCNC: 0.3 MG/DL — SIGNIFICANT CHANGE UP (ref 0.2–1.2)
BILIRUB UR-MCNC: NEGATIVE — SIGNIFICANT CHANGE UP
BUN SERPL-MCNC: 23 MG/DL — HIGH (ref 10–20)
CALCIUM SERPL-MCNC: 9.5 MG/DL — SIGNIFICANT CHANGE UP (ref 8.5–10.1)
CHLORIDE SERPL-SCNC: 106 MMOL/L — SIGNIFICANT CHANGE UP (ref 98–110)
CO2 SERPL-SCNC: 23 MMOL/L — SIGNIFICANT CHANGE UP (ref 17–32)
COLOR SPEC: YELLOW — SIGNIFICANT CHANGE UP
CREAT SERPL-MCNC: 1.1 MG/DL — SIGNIFICANT CHANGE UP (ref 0.7–1.5)
DIFF PNL FLD: NEGATIVE — SIGNIFICANT CHANGE UP
EGFR: 63 ML/MIN/1.73M2 — SIGNIFICANT CHANGE UP
GLUCOSE SERPL-MCNC: 92 MG/DL — SIGNIFICANT CHANGE UP (ref 70–99)
GLUCOSE UR QL: ABNORMAL
HCT VFR BLD CALC: 39.8 % — LOW (ref 42–52)
HGB BLD-MCNC: 13.5 G/DL — LOW (ref 14–18)
KETONES UR-MCNC: NEGATIVE — SIGNIFICANT CHANGE UP
LEUKOCYTE ESTERASE UR-ACNC: NEGATIVE — SIGNIFICANT CHANGE UP
MCHC RBC-ENTMCNC: 32.5 PG — HIGH (ref 27–31)
MCHC RBC-ENTMCNC: 33.9 G/DL — SIGNIFICANT CHANGE UP (ref 32–37)
MCV RBC AUTO: 95.7 FL — HIGH (ref 80–94)
NITRITE UR-MCNC: NEGATIVE — SIGNIFICANT CHANGE UP
NRBC # BLD: 0 /100 WBCS — SIGNIFICANT CHANGE UP (ref 0–0)
NT-PROBNP SERPL-SCNC: 447 PG/ML — HIGH (ref 0–300)
PH UR: 5.5 — SIGNIFICANT CHANGE UP (ref 5–8)
PLATELET # BLD AUTO: 109 K/UL — LOW (ref 130–400)
POTASSIUM SERPL-MCNC: 4.1 MMOL/L — SIGNIFICANT CHANGE UP (ref 3.5–5)
POTASSIUM SERPL-SCNC: 4.1 MMOL/L — SIGNIFICANT CHANGE UP (ref 3.5–5)
PROT SERPL-MCNC: 6.7 G/DL — SIGNIFICANT CHANGE UP (ref 6–8)
PROT UR-MCNC: SIGNIFICANT CHANGE UP
RBC # BLD: 4.16 M/UL — LOW (ref 4.7–6.1)
RBC # FLD: 13.2 % — SIGNIFICANT CHANGE UP (ref 11.5–14.5)
SODIUM SERPL-SCNC: 142 MMOL/L — SIGNIFICANT CHANGE UP (ref 135–146)
SP GR SPEC: 1.02 — SIGNIFICANT CHANGE UP (ref 1.01–1.03)
TROPONIN T SERPL-MCNC: <0.01 NG/ML — SIGNIFICANT CHANGE UP
UROBILINOGEN FLD QL: SIGNIFICANT CHANGE UP
WBC # BLD: 8.87 K/UL — SIGNIFICANT CHANGE UP (ref 4.8–10.8)
WBC # FLD AUTO: 8.87 K/UL — SIGNIFICANT CHANGE UP (ref 4.8–10.8)

## 2022-04-04 PROCEDURE — 99285 EMERGENCY DEPT VISIT HI MDM: CPT | Mod: FS

## 2022-04-04 PROCEDURE — 70450 CT HEAD/BRAIN W/O DYE: CPT | Mod: 26,MA

## 2022-04-04 PROCEDURE — 93010 ELECTROCARDIOGRAM REPORT: CPT

## 2022-04-04 PROCEDURE — 71045 X-RAY EXAM CHEST 1 VIEW: CPT | Mod: 26

## 2022-04-04 RX ORDER — ACETAMINOPHEN 500 MG
975 TABLET ORAL ONCE
Refills: 0 | Status: COMPLETED | OUTPATIENT
Start: 2022-04-04 | End: 2022-04-04

## 2022-04-04 RX ORDER — METOCLOPRAMIDE HCL 10 MG
10 TABLET ORAL ONCE
Refills: 0 | Status: COMPLETED | OUTPATIENT
Start: 2022-04-04 | End: 2022-04-04

## 2022-04-04 RX ORDER — KETOROLAC TROMETHAMINE 30 MG/ML
15 SYRINGE (ML) INJECTION ONCE
Refills: 0 | Status: DISCONTINUED | OUTPATIENT
Start: 2022-04-04 | End: 2022-04-04

## 2022-04-04 RX ADMIN — Medication 104 MILLIGRAM(S): at 16:31

## 2022-04-04 RX ADMIN — Medication 10 MILLIGRAM(S): at 16:44

## 2022-04-04 RX ADMIN — Medication 975 MILLIGRAM(S): at 16:31

## 2022-04-04 RX ADMIN — Medication 15 MILLIGRAM(S): at 19:17

## 2022-04-04 NOTE — ED PROVIDER NOTE - PATIENT PORTAL LINK FT
You can access the FollowMyHealth Patient Portal offered by [facility] by registering at the following website: [email or web address]. By joining [employer]’s FollowMyHealth portal, you will also be able to view your health information using other applications (apps) compatible with our system.

## 2022-04-04 NOTE — ED ADULT NURSE NOTE - NSNEUBEH_NEU_P_CORE
Telephone Encounter by Marcy Bang RN, BSN at 04/25/18 04:29 PM     Author:  Marcy Bang RN, BSN Service:  (none) Author Type:  Registered Nurse     Filed:  04/25/18 04:30 PM Encounter Date:  4/24/2018 Status:  Signed     :  Marcy Bang RN, BSN (Registered Nurse)            Patient notified[JH1.1T] of the risk for cardiovascular disease and increased risk for stroke in untreated HLD.[JH1.1M]       Revision History        User Key Date/Time User Provider Type Action    > JH1.1 04/25/18 04:30 PM Marcy Bang, RN, BSN Registered Nurse Sign    M - Manual, T - Template             no

## 2022-04-04 NOTE — ED PROVIDER NOTE - CLINICAL SUMMARY MEDICAL DECISION MAKING FREE TEXT BOX
CT head negative, patient feels better, neurovascularly intact, DC home with PMD follow-up 1 to 2 weeks, strict return precautions

## 2022-04-04 NOTE — ED PROVIDER NOTE - NS ED ATTENDING STATEMENT MOD
This was a shared visit with the LYNN. I reviewed and verified the documentation and independently performed the documented:

## 2022-04-04 NOTE — ED ADULT NURSE NOTE - OBJECTIVE STATEMENT
As per daughter patient complaining of headache x4days and elevated BP at home. BP was WNL this morning but has steadily increased throughout the day.

## 2022-04-04 NOTE — ED PROVIDER NOTE - OBJECTIVE STATEMENT
89 year old male with pmhx of cabg, tvar, pacemaker, dm, and htn presents with headache x 4 days. pain is pressure like, has had headaches like this prior. Pt also admits took his BP today at home and was elevated, systolic 150-160s. pt denies chest pain, sob, dizziness, n/v, visual changes, slurred speech, abd pain or back pain. pt also taking levoquin for UTI for last few days - prescribed by pmd

## 2022-04-04 NOTE — ED PROVIDER NOTE - ATTENDING CONTRIBUTION TO CARE
Daughter at bedside translating H&P at patient request.patient is Filipino-speaking.    89yoM PMH CAD CABG diabetes TAVR pacemaker hypertension presents with hypertension and headache.  Patient states symptoms started after starting Levaquin for UTI 4 days ago.  Pain is constant pressure nonradiating.  No numbness weakness tingling blurry vision slurred speech trouble walking altered mental status.  No falls.  No blood thinners.  No fever cough runny nose.  No chest pain shortness of breath.  No nausea vomiting abdominal pain.  Patient lives home alone with home health aide 8 hours a day for 4days a week and 5 hours a days for 3 days. Patient states his blood pressure has been high today.    On exam, AFVSS, Well appearing, No acute distress, NCAT, EOMI, PERRLA, MMM, Neck supple, LCTAB, RRR nl s1s2 No mrg, Abdomen Soft NTND, aaox3, CN 2-12 intact, No nystagmus.  5/5 motor x 4 ext, SILT x 4 extremities, No facial droop or slurred speech. No pronator drift.  No midline C/T/L tenderness to palpation or step off. No LE edema or calf TTP,    A/P; headache, will do labs CT head urine EKG pain control reeval

## 2022-04-05 LAB
CULTURE RESULTS: SIGNIFICANT CHANGE UP
SPECIMEN SOURCE: SIGNIFICANT CHANGE UP

## 2022-04-10 NOTE — PHYSICAL EXAM
[General Appearance - Well Developed] : well developed [Normal Appearance] : normal appearance [Well Groomed] : well groomed [General Appearance - Well Nourished] : well nourished [General Appearance - In No Acute Distress] : no acute distress [No Deformities] : no deformities [Heart Rate And Rhythm] : heart rate and rhythm were normal [Heart Sounds] : normal S1 and S2 [Murmurs] : no murmurs present [FreeTextEntry1] : Mechanical valve ausculated [] : no respiratory distress [Respiration, Rhythm And Depth] : normal respiratory rhythm and effort [Auscultation Breath Sounds / Voice Sounds] : lungs were clear to auscultation bilaterally [Exaggerated Use Of Accessory Muscles For Inspiration] : no accessory muscle use [Left Infraclavicular] : left infraclavicular area [Well-Healed] : well-healed [Abdomen Soft] : soft [Nail Clubbing] : no clubbing of the fingernails [Cyanosis, Localized] : no localized cyanosis

## 2022-04-10 NOTE — HISTORY OF PRESENT ILLNESS
[FreeTextEntry1] : H/o CAD, s/p CABG at [facility] in 2011. \par \par H/o DM, HTN, hyperlipidemia. \par \par Progressive AS, s/p TAVR at [place] in May 2019, followed by PPM placement.\par \par Patient c/o GARCIA since TAVR implant.

## 2022-04-10 NOTE — ASSESSMENT
[FreeTextEntry1] : DC PPM interrogation\par - Device function normal. Parameters stable. \par - Patient enrolled in remote monitoring and transmitting\par \par

## 2022-04-10 NOTE — PROCEDURE
[No] : not [NSR] : normal sinus rhythm [See Scanned Paceart Report] : See scanned paceart report [See Device Printout] : See device printout [Pacemaker] : pacemaker [DDD] : DDD [Voltage: ___ volts] : Voltage was [unfilled] volts [Magnet Rate: ___ Ppm] : magnet rate was [unfilled] Ppm [Normal] : The battery status is normal. [Asense-Vpace ___ %] : Asense-Vpace [unfilled]% [Apace-Vsense ___ %] : Apace-Vsense [unfilled]% [de-identified] : [facility] [employer] [de-identified] : 1081 [de-identified] : [id number] [de-identified] : 6/4/2019 [de-identified] : 60/130 [de-identified] : Rate response turned on [de-identified] : 9.2-10.5 years [de-identified] : No new episodes

## 2022-05-06 ENCOUNTER — APPOINTMENT (OUTPATIENT)
Dept: CARDIOLOGY | Facility: CLINIC | Age: 87
End: 2022-05-06
Payer: MEDICARE

## 2022-05-06 ENCOUNTER — NON-APPOINTMENT (OUTPATIENT)
Age: 87
End: 2022-05-06

## 2022-05-06 PROCEDURE — 93294 REM INTERROG EVL PM/LDLS PM: CPT

## 2022-05-06 PROCEDURE — 93296 REM INTERROG EVL PM/IDS: CPT

## 2022-08-04 ENCOUNTER — APPOINTMENT (OUTPATIENT)
Dept: CARDIOLOGY | Facility: CLINIC | Age: 87
End: 2022-08-04

## 2022-08-04 ENCOUNTER — RESULT CHARGE (OUTPATIENT)
Age: 87
End: 2022-08-04

## 2022-08-04 VITALS
HEART RATE: 60 BPM | SYSTOLIC BLOOD PRESSURE: 158 MMHG | DIASTOLIC BLOOD PRESSURE: 66 MMHG | HEIGHT: 69 IN | WEIGHT: 169 LBS | BODY MASS INDEX: 25.03 KG/M2

## 2022-08-04 VITALS — SYSTOLIC BLOOD PRESSURE: 124 MMHG | DIASTOLIC BLOOD PRESSURE: 70 MMHG

## 2022-08-04 PROCEDURE — 99214 OFFICE O/P EST MOD 30 MIN: CPT

## 2022-08-04 PROCEDURE — 93000 ELECTROCARDIOGRAM COMPLETE: CPT

## 2022-08-04 RX ORDER — LORAZEPAM 1 MG/1
1 TABLET ORAL
Qty: 30 | Refills: 0 | Status: DISCONTINUED | COMMUNITY
Start: 2022-04-07

## 2022-08-04 RX ORDER — IBUPROFEN 400 MG/1
400 TABLET, FILM COATED ORAL
Qty: 60 | Refills: 0 | Status: DISCONTINUED | COMMUNITY
Start: 2022-04-07

## 2022-08-04 RX ORDER — HYDROCHLOROTHIAZIDE 12.5 MG/1
12.5 CAPSULE ORAL
Qty: 30 | Refills: 0 | Status: DISCONTINUED | COMMUNITY
Start: 2022-07-11

## 2022-08-04 RX ORDER — AZITHROMYCIN 250 MG/1
250 TABLET, FILM COATED ORAL
Qty: 6 | Refills: 0 | Status: DISCONTINUED | COMMUNITY
Start: 2022-06-27

## 2022-08-04 RX ORDER — DICLOFENAC SODIUM 1% 10 MG/G
1 GEL TOPICAL
Qty: 300 | Refills: 0 | Status: DISCONTINUED | COMMUNITY
Start: 2022-08-01

## 2022-08-04 RX ORDER — GLIPIZIDE 5 MG/1
5 TABLET ORAL
Qty: 30 | Refills: 0 | Status: DISCONTINUED | COMMUNITY
Start: 2022-07-18

## 2022-08-04 NOTE — ASSESSMENT
[FreeTextEntry1] : CAD, s/p CABG, patent grafts prior to TAVR. \par CP now due to rib fracture..\par HTN controlled now.\par S/p TAVR. \par Diastolic dysfunction, appears euvolemic.\par Hyperlipidemia.\par \par \par Plan:\par Reduce Amlodipine to 2.5 mg daily.\par Continue the rest of the medications.\par Avoid sudden standing.\par Repeat 2D ECHO.\par F/u in 3 months.\par \par Eleazar Nguyễn MD\par

## 2022-08-04 NOTE — HISTORY OF PRESENT ILLNESS
[FreeTextEntry1] : H/o CAD, s/p CABG at [facility] in 2011. \par \par H/o DM, HTN, hyperlipidemia. \par \par Progressive AS, s/p TAVR at [place] in May 2019, followed by PPM placement.\par \par Patient got up quickly after getting a phone call, felt lightheaded, tripped a fell, broke 6 ribs. He still c/o left-sided chest pain since the fall.

## 2022-08-04 NOTE — CARDIOLOGY SUMMARY
[de-identified] : 08/04/22:\par Atrial pacing 60/min, 1st degree AVB, LBBB. [de-identified] : 06/15/21:\par LVEF 64%, G2DD\par S/p TAVR, normal function, PG 23/16\par Mild MR, TR.

## 2022-08-05 ENCOUNTER — APPOINTMENT (OUTPATIENT)
Dept: CARDIOLOGY | Facility: CLINIC | Age: 87
End: 2022-08-05

## 2022-08-05 ENCOUNTER — NON-APPOINTMENT (OUTPATIENT)
Age: 87
End: 2022-08-05

## 2022-08-05 PROCEDURE — 93294 REM INTERROG EVL PM/LDLS PM: CPT

## 2022-08-05 PROCEDURE — 93296 REM INTERROG EVL PM/IDS: CPT

## 2022-11-04 ENCOUNTER — NON-APPOINTMENT (OUTPATIENT)
Age: 87
End: 2022-11-04

## 2022-11-04 ENCOUNTER — APPOINTMENT (OUTPATIENT)
Dept: CARDIOLOGY | Facility: CLINIC | Age: 87
End: 2022-11-04

## 2022-11-04 PROCEDURE — 93296 REM INTERROG EVL PM/IDS: CPT

## 2022-11-04 PROCEDURE — 93294 REM INTERROG EVL PM/LDLS PM: CPT

## 2022-11-08 ENCOUNTER — APPOINTMENT (OUTPATIENT)
Dept: CARDIOLOGY | Facility: CLINIC | Age: 87
End: 2022-11-08

## 2022-11-08 ENCOUNTER — RESULT CHARGE (OUTPATIENT)
Age: 87
End: 2022-11-08

## 2022-11-08 VITALS
HEART RATE: 60 BPM | TEMPERATURE: 97.6 F | BODY MASS INDEX: 25.48 KG/M2 | DIASTOLIC BLOOD PRESSURE: 70 MMHG | HEIGHT: 69 IN | SYSTOLIC BLOOD PRESSURE: 140 MMHG | WEIGHT: 172 LBS | RESPIRATION RATE: 16 BRPM

## 2022-11-08 PROCEDURE — 99214 OFFICE O/P EST MOD 30 MIN: CPT

## 2022-11-08 PROCEDURE — 93306 TTE W/DOPPLER COMPLETE: CPT

## 2022-11-08 PROCEDURE — 93000 ELECTROCARDIOGRAM COMPLETE: CPT

## 2022-11-08 NOTE — REVIEW OF SYSTEMS
[Headache] : headache [Dizziness] : dizziness [Tremor] : a tremor was seen [Negative] : Gastrointestinal [Feeling Fatigued] : feeling fatigued [Lower Ext Edema] : no extremity edema [Leg Claudication] : no intermittent leg claudication [Palpitations] : no palpitations [Syncope] : no syncope [Rash] : no rash [Numbness (Hypoesthesia)] : no numbness [Speech Disturbance] : no speech disturbance [Anxiety] : no anxiety [Easy Bleeding] : no tendency for easy bleeding [Easy Bruising] : no tendency for easy bruising

## 2022-11-08 NOTE — HISTORY OF PRESENT ILLNESS
[FreeTextEntry1] : Pt is 89 year old male with PMH of  CAD, s/p CABG at [facility] in 2011.\par Progressive AS, s/p TAVR at [place] in May 2019, followed by PPM placement.\par \par H/o DM, HTN, hyperlipidemia. Hx of getting  up quickly after getting a phone call, felt lightheaded, tripped a fell, broke 6 ribs.  Healed no more pain.  Pt is active, walks QD 2 miles  with R/w without Angina.  Pt c.o occasional needle  like pain on the L side of the chest mostly after meals that resolves on its own.\par \par \par Chol 129 LDL 52 Trig 125.

## 2022-11-08 NOTE — CARDIOLOGY SUMMARY
[de-identified] : 11/08/22:\par Atrial pacing 60/min, 1st degree AVB, LBBB. [de-identified] : 06/15/21:\par LVEF 64%, G2DD\par S/p TAVR, normal function, PG 23/16\par Mild MR, TR.

## 2022-11-08 NOTE — ASSESSMENT
[FreeTextEntry1] : CAD, s/p CABG, patent grafts prior to TAVR. \par CP now due to rib fracture..\par HTN controlled now.\par S/p TAVR. \par Diastolic dysfunction, appears euvolemic.\par Hyperlipidemia.\par \par \par Plan:\par Continue treatment.\par Avoid sudden standing.\par Repeat blood work.\par F/u in 3 months.\par \par Eleazar Nguyễn MD\par

## 2022-11-19 ENCOUNTER — EMERGENCY (EMERGENCY)
Facility: HOSPITAL | Age: 87
LOS: 0 days | Discharge: HOME | End: 2022-11-19
Attending: EMERGENCY MEDICINE | Admitting: EMERGENCY MEDICINE

## 2022-11-19 VITALS
RESPIRATION RATE: 18 BRPM | TEMPERATURE: 98 F | WEIGHT: 160.06 LBS | SYSTOLIC BLOOD PRESSURE: 189 MMHG | HEART RATE: 60 BPM | OXYGEN SATURATION: 95 % | DIASTOLIC BLOOD PRESSURE: 88 MMHG | HEIGHT: 64 IN

## 2022-11-19 VITALS
RESPIRATION RATE: 17 BRPM | SYSTOLIC BLOOD PRESSURE: 155 MMHG | DIASTOLIC BLOOD PRESSURE: 75 MMHG | HEART RATE: 60 BPM | OXYGEN SATURATION: 98 %

## 2022-11-19 DIAGNOSIS — M79.601 PAIN IN RIGHT ARM: ICD-10-CM

## 2022-11-19 DIAGNOSIS — E78.5 HYPERLIPIDEMIA, UNSPECIFIED: ICD-10-CM

## 2022-11-19 DIAGNOSIS — S30.810A ABRASION OF LOWER BACK AND PELVIS, INITIAL ENCOUNTER: ICD-10-CM

## 2022-11-19 DIAGNOSIS — M54.9 DORSALGIA, UNSPECIFIED: ICD-10-CM
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
## 2022-11-19 DIAGNOSIS — Z20.822 CONTACT WITH AND (SUSPECTED) EXPOSURE TO COVID-19: ICD-10-CM

## 2022-11-19 DIAGNOSIS — E11.9 TYPE 2 DIABETES MELLITUS WITHOUT COMPLICATIONS: ICD-10-CM
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
## 2022-11-19 DIAGNOSIS — I44.7 LEFT BUNDLE-BRANCH BLOCK, UNSPECIFIED: ICD-10-CM

## 2022-11-19 DIAGNOSIS — S41.111A LACERATION WITHOUT FOREIGN BODY OF RIGHT UPPER ARM, INITIAL ENCOUNTER: ICD-10-CM

## 2022-11-19 DIAGNOSIS — I10 ESSENTIAL (PRIMARY) HYPERTENSION: ICD-10-CM

## 2022-11-19 LAB
ALBUMIN SERPL ELPH-MCNC: 3.9 G/DL — SIGNIFICANT CHANGE UP (ref 3.5–5.2)
ALP SERPL-CCNC: 71 U/L — SIGNIFICANT CHANGE UP (ref 30–115)
ALT FLD-CCNC: 14 U/L — SIGNIFICANT CHANGE UP (ref 0–41)
ANION GAP SERPL CALC-SCNC: 10 MMOL/L — SIGNIFICANT CHANGE UP (ref 7–14)
APTT BLD: 30 SEC — SIGNIFICANT CHANGE UP (ref 27–39.2)
AST SERPL-CCNC: 18 U/L — SIGNIFICANT CHANGE UP (ref 0–41)
BASOPHILS # BLD AUTO: 0.03 K/UL — SIGNIFICANT CHANGE UP (ref 0–0.2)
BASOPHILS NFR BLD AUTO: 0.3 % — SIGNIFICANT CHANGE UP (ref 0–1)
BILIRUB SERPL-MCNC: 0.3 MG/DL — SIGNIFICANT CHANGE UP (ref 0.2–1.2)
BUN SERPL-MCNC: 23 MG/DL — HIGH (ref 10–20)
CALCIUM SERPL-MCNC: 9.3 MG/DL — SIGNIFICANT CHANGE UP (ref 8.4–10.5)
CHLORIDE SERPL-SCNC: 102 MMOL/L — SIGNIFICANT CHANGE UP (ref 98–110)
CO2 SERPL-SCNC: 27 MMOL/L — SIGNIFICANT CHANGE UP (ref 17–32)
CREAT SERPL-MCNC: 1.4 MG/DL — SIGNIFICANT CHANGE UP (ref 0.7–1.5)
EGFR: 47 ML/MIN/1.73M2 — LOW
EOSINOPHIL # BLD AUTO: 0.27 K/UL — SIGNIFICANT CHANGE UP (ref 0–0.7)
EOSINOPHIL NFR BLD AUTO: 2.9 % — SIGNIFICANT CHANGE UP (ref 0–8)
GLUCOSE SERPL-MCNC: 120 MG/DL — HIGH (ref 70–99)
HCT VFR BLD CALC: 33.4 % — LOW (ref 42–52)
HGB BLD-MCNC: 10.9 G/DL — LOW (ref 14–18)
IMM GRANULOCYTES NFR BLD AUTO: 1 % — HIGH (ref 0.1–0.3)
INR BLD: 0.98 RATIO — SIGNIFICANT CHANGE UP (ref 0.65–1.3)
LACTATE SERPL-SCNC: 1.4 MMOL/L — SIGNIFICANT CHANGE UP (ref 0.7–2)
LIDOCAIN IGE QN: 39 U/L — SIGNIFICANT CHANGE UP (ref 7–60)
LYMPHOCYTES # BLD AUTO: 1.4 K/UL — SIGNIFICANT CHANGE UP (ref 1.2–3.4)
LYMPHOCYTES # BLD AUTO: 15.2 % — LOW (ref 20.5–51.1)
MCHC RBC-ENTMCNC: 32.1 PG — HIGH (ref 27–31)
MCHC RBC-ENTMCNC: 32.6 G/DL — SIGNIFICANT CHANGE UP (ref 32–37)
MCV RBC AUTO: 98.2 FL — HIGH (ref 80–94)
MONOCYTES # BLD AUTO: 0.74 K/UL — HIGH (ref 0.1–0.6)
MONOCYTES NFR BLD AUTO: 8 % — SIGNIFICANT CHANGE UP (ref 1.7–9.3)
NEUTROPHILS # BLD AUTO: 6.7 K/UL — HIGH (ref 1.4–6.5)
NEUTROPHILS NFR BLD AUTO: 72.6 % — SIGNIFICANT CHANGE UP (ref 42.2–75.2)
NRBC # BLD: 0 /100 WBCS — SIGNIFICANT CHANGE UP (ref 0–0)
PLATELET # BLD AUTO: 107 K/UL — LOW (ref 130–400)
POTASSIUM SERPL-MCNC: 4.4 MMOL/L — SIGNIFICANT CHANGE UP (ref 3.5–5)
POTASSIUM SERPL-SCNC: 4.4 MMOL/L — SIGNIFICANT CHANGE UP (ref 3.5–5)
PROT SERPL-MCNC: 6.1 G/DL — SIGNIFICANT CHANGE UP (ref 6–8)
PROTHROM AB SERPL-ACNC: 11.2 SEC — SIGNIFICANT CHANGE UP (ref 9.95–12.87)
RBC # BLD: 3.4 M/UL — LOW (ref 4.7–6.1)
RBC # FLD: 13.2 % — SIGNIFICANT CHANGE UP (ref 11.5–14.5)
SARS-COV-2 RNA SPEC QL NAA+PROBE: SIGNIFICANT CHANGE UP
SODIUM SERPL-SCNC: 139 MMOL/L — SIGNIFICANT CHANGE UP (ref 135–146)
TROPONIN T SERPL-MCNC: <0.01 NG/ML — SIGNIFICANT CHANGE UP
WBC # BLD: 9.23 K/UL — SIGNIFICANT CHANGE UP (ref 4.8–10.8)
WBC # FLD AUTO: 9.23 K/UL — SIGNIFICANT CHANGE UP (ref 4.8–10.8)

## 2022-11-19 PROCEDURE — 73060 X-RAY EXAM OF HUMERUS: CPT | Mod: 26,RT

## 2022-11-19 PROCEDURE — 71045 X-RAY EXAM CHEST 1 VIEW: CPT | Mod: 26

## 2022-11-19 PROCEDURE — 74177 CT ABD & PELVIS W/CONTRAST: CPT | Mod: 26,MA

## 2022-11-19 PROCEDURE — 72125 CT NECK SPINE W/O DYE: CPT | Mod: 26,MA

## 2022-11-19 PROCEDURE — 93010 ELECTROCARDIOGRAM REPORT: CPT

## 2022-11-19 PROCEDURE — 71260 CT THORAX DX C+: CPT | Mod: 26,MA

## 2022-11-19 PROCEDURE — 72170 X-RAY EXAM OF PELVIS: CPT | Mod: 26

## 2022-11-19 PROCEDURE — 70450 CT HEAD/BRAIN W/O DYE: CPT | Mod: 26,MA

## 2022-11-19 PROCEDURE — 99285 EMERGENCY DEPT VISIT HI MDM: CPT | Mod: FS

## 2022-11-19 RX ORDER — TETANUS TOXOID, REDUCED DIPHTHERIA TOXOID AND ACELLULAR PERTUSSIS VACCINE, ADSORBED 5; 2.5; 8; 8; 2.5 [IU]/.5ML; [IU]/.5ML; UG/.5ML; UG/.5ML; UG/.5ML
0.5 SUSPENSION INTRAMUSCULAR ONCE
Refills: 0 | Status: COMPLETED | OUTPATIENT
Start: 2022-11-19 | End: 2022-11-19

## 2022-11-19 RX ORDER — ACETAMINOPHEN 500 MG
975 TABLET ORAL ONCE
Refills: 0 | Status: COMPLETED | OUTPATIENT
Start: 2022-11-19 | End: 2022-11-19

## 2022-11-19 RX ADMIN — Medication 975 MILLIGRAM(S): at 22:26

## 2022-11-19 NOTE — ED ADULT NURSE NOTE - NSIMPLEMENTINTERV_GEN_ALL_ED
Implemented All Fall with Harm Risk Interventions:  [place] to call system. Call bell, personal items and telephone within reach. Instruct patient to call for assistance. Room bathroom lighting operational. Non-slip footwear when patient is off stretcher. Physically safe environment: no spills, clutter or unnecessary equipment. Stretcher in lowest position, wheels locked, appropriate side rails in place. Provide visual cue, wrist band, yellow gown, etc. Monitor gait and stability. Monitor for mental status changes and reorient to person, place, and time. Review medications for side effects contributing to fall risk. Reinforce activity limits and safety measures with patient and family. Provide visual clues: red socks.

## 2022-11-19 NOTE — ED PROVIDER NOTE - PATIENT PORTAL LINK FT
You can access the FollowMyHealth Patient Portal offered by [facility] by registering at the following website: [email or web address]. By joining [employer]’s FollowMyHealth portal, you will also be able to view your health information using other applications (apps) compatible with our system.

## 2022-11-19 NOTE — ED ADULT TRIAGE NOTE - CHIEF COMPLAINT QUOTE
BIBA, as per EMS "pt got dizzy and fell, c/l Left lower back pain and has skin tear to R bicep; family had to help up"

## 2022-11-19 NOTE — ED PROVIDER NOTE - CLINICAL SUMMARY MEDICAL DECISION MAKING FREE TEXT BOX
Patient s/p mechanical fall.  Labs and imaging obtained.  Tetanus updated and pain was treated.  Results reviewed and discussed with patient and family.  No acute injury.  Will DC home to follow-up with PMD.

## 2022-11-19 NOTE — ED PROVIDER NOTE - CARE PLAN
Principal Discharge DX:	Fall at home   1 Principal Discharge DX:	Fall at home  Secondary Diagnosis:	Skin abrasion  Secondary Diagnosis:	Contusion of right arm

## 2022-11-19 NOTE — ED PROVIDER NOTE - ATTENDING APP SHARED VISIT CONTRIBUTION OF CARE
89-year-old male past medical history noted presents for evaluation s/p fall.  Patient with pain to back as well as right arm.  No history of anticoagulation use.  Patient states he did not hit head.  On exam patient in NAD, alert and awake, GCS 15, no signs of head trauma, no midline vertebral tenderness, positive abrasion with ecchymosis to right upper arm, positive mild tenderness to left lateral lumbar spine ,pelvis stable, hips nontender, good range of motion times all extremities, abdomen soft nontender nondistended

## 2022-11-19 NOTE — ED PROVIDER NOTE - OBJECTIVE STATEMENT
89 yo male, pmh of htn, hld, dm, presents to ed for fall, occurred today, mechanical, c/o left back and right arm pain, mild, aching, no radiation, no a/c use, no chi. Denies fever, chills, cp, sob, neck pain, visual changes, nvd, dizziness, numbness, tingling, loc.

## 2022-11-19 NOTE — ED PROVIDER NOTE - NS ED ROS FT
Constitutional: (-) fever, (-) chills  Eyes: (-) visual changes  ENT: (-) nasal congestions  Cardiovascular: (-) chest pain, (-) syncope  Respiratory: (-) cough, (-) shortness of breath, (-) dyspnea,   Gastrointestinal: (-) vomiting, (-) diarrhea, (-)nausea,  Musculoskeletal: (-) neck pain, (+) back pain, (+) joint pain,  Integumentary: (-) rash, (-) edema, (-) bruises  Neurological: (-) headache, (-) loc, (-) dizziness, (-) tingling, (-)numbness,  Peripheral Vascular: (-) leg swelling  :  (-)dysuria,  (-) hematuria  Allergic/Immunologic: (-) pruritus

## 2022-11-19 NOTE — ED PROVIDER NOTE - PHYSICAL EXAMINATION
Physical Exam    Vital Signs: I have reviewed the initial vital signs.  Constitutional: appears stated age, no acute distress  Eyes: Conjunctiva pink, Sclera clear  Cardiovascular: S1 and S2, regular rate, regular rhythm, well-perfused extremities, radial pulses equal and 2+, pedal pulses 2+ and equal  Respiratory: unlabored respiratory effort, clear to auscultation bilaterally no wheezing, rales and rhonchi  Gastrointestinal: soft, non-tender abdomen, no pulsatile mass, normal bowl sounds  Musculoskeletal: supple neck, no lower extremity edema, no midline tenderness, right upper arm mild ttp, left lumbar paraspinal ttp.   Integumentary: ecchymosis to right upper arm with skin tear.   Neurologic: awake, alert, nvi

## 2022-12-15 ENCOUNTER — APPOINTMENT (OUTPATIENT)
Dept: CARDIOLOGY | Facility: CLINIC | Age: 87
End: 2022-12-15

## 2022-12-15 VITALS
BODY MASS INDEX: 25.48 KG/M2 | SYSTOLIC BLOOD PRESSURE: 114 MMHG | DIASTOLIC BLOOD PRESSURE: 58 MMHG | RESPIRATION RATE: 16 BRPM | TEMPERATURE: 97.1 F | HEIGHT: 69 IN | WEIGHT: 172 LBS | HEART RATE: 61 BPM

## 2022-12-15 DIAGNOSIS — Z45.010 ENCOUNTER FOR CHECKING AND TESTING OF CARDIAC PACEMAKER PULSE GENERATOR [BATTERY]: ICD-10-CM

## 2022-12-15 PROCEDURE — 93280 PM DEVICE PROGR EVAL DUAL: CPT

## 2022-12-15 PROCEDURE — 99214 OFFICE O/P EST MOD 30 MIN: CPT

## 2022-12-15 NOTE — REASON FOR VISIT
[___ Device Check] : is here today for a [unfilled] device check for [Family Member] : family member [Chest Pain] : chest pain [Coronary Artery Disease] : coronary artery disease

## 2022-12-20 NOTE — CARDIOLOGY SUMMARY
[___] : [unfilled] [de-identified] : 12/15/2022: Atrial-paced, lateral Q-waves HR 61 bpm [de-identified] : 11/08/2022: LVEF 71%, mild LVH, mild mitral annular calcification, mild MR, aortic prosthesis function/structure normal

## 2022-12-20 NOTE — ASSESSMENT
[FreeTextEntry1] : DC PPM interrogation\par - Device function normal. Parameters stable. \par - Patient enrolled in remote monitoring and transmitting\par \par HTN\par -Continue HCTZ 12.5mg QD, amlodipine 2.5mg QD, losartan 50mg QD\par \par CAD/Angina\par -Continue ASA 81mg QD, crestor 5mg QD, Isorbide  mg QD, nitro 0.3 PRN\par -Low sat fat diet encouraged\par -F/U with cardio Dr. Nugyễn\par \par I recommend to f/u with cardio for any cardiac-related s/s.\par \par \par RTO 9 months\par \par

## 2022-12-20 NOTE — HISTORY OF PRESENT ILLNESS
[FreeTextEntry1] : H/o CAD, s/p CABG at [facility] in 2011. \par \par H/o DM, HTN, hyperlipidemia. \par \par Progressive AS, s/p TAVR at [place] in May 2019, followed by PPM placement.\par \par Patient c/o GARCIA since TAVR implant. \par \par Pt ambulates w/ rolling walker.\par \par ECG today 12/15/2022: Atrial-paced, lateral Q-waves HR 61 bpm

## 2022-12-20 NOTE — PROCEDURE
[No] : not [Pacemaker] : pacemaker [DDDR] : DDDR [Voltage: ___ volts] : Voltage was [unfilled] volts [Magnet Rate: ___ Ppm] : magnet rate was [unfilled] Ppm [Threshold Testing Performed] : Threshold testing was performed [Lead Imp:  ___ohms] : lead impedance was [unfilled] ohms [Sensing Amplitude ___mv] : sensing amplitude was [unfilled] mv [___V @] : [unfilled] V [___ ms] : [unfilled] ms [Programmed for Longevity] : output reprogrammed for improved battery longevity [Apace-Vpace ___ %] : Apace-Vpace [unfilled]% [de-identified] : [facility] [employer] [de-identified] : 1722 [de-identified] : [id number] [de-identified] : 06/04/2019 [de-identified] : 60 [de-identified] : 6.9-7.3 years [de-identified] : No new episodes to report.\par Patient is transmitting to [place].

## 2023-01-01 ENCOUNTER — EMERGENCY (EMERGENCY)
Facility: HOSPITAL | Age: 88
LOS: 0 days | Discharge: HOME | End: 2023-01-01
Attending: EMERGENCY MEDICINE | Admitting: EMERGENCY MEDICINE
Payer: MEDICARE

## 2023-01-01 VITALS
WEIGHT: 173.94 LBS | RESPIRATION RATE: 20 BRPM | DIASTOLIC BLOOD PRESSURE: 64 MMHG | HEIGHT: 64 IN | HEART RATE: 64 BPM | SYSTOLIC BLOOD PRESSURE: 151 MMHG | TEMPERATURE: 96 F | OXYGEN SATURATION: 100 %

## 2023-01-01 DIAGNOSIS — S00.33XA CONTUSION OF NOSE, INITIAL ENCOUNTER: ICD-10-CM

## 2023-01-01 DIAGNOSIS — S09.90XA UNSPECIFIED INJURY OF HEAD, INITIAL ENCOUNTER: ICD-10-CM
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
## 2023-01-01 DIAGNOSIS — M79.602 PAIN IN LEFT ARM: ICD-10-CM

## 2023-01-01 DIAGNOSIS — S02.2XXA FRACTURE OF NASAL BONES, INITIAL ENCOUNTER FOR CLOSED FRACTURE: ICD-10-CM

## 2023-01-01 DIAGNOSIS — S59.912A UNSPECIFIED INJURY OF LEFT FOREARM, INITIAL ENCOUNTER: ICD-10-CM
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
## 2023-01-01 DIAGNOSIS — I10 ESSENTIAL (PRIMARY) HYPERTENSION: ICD-10-CM

## 2023-01-01 DIAGNOSIS — R07.81 PLEURODYNIA: ICD-10-CM

## 2023-01-01 DIAGNOSIS — E78.5 HYPERLIPIDEMIA, UNSPECIFIED: ICD-10-CM

## 2023-01-01 DIAGNOSIS — E11.9 TYPE 2 DIABETES MELLITUS WITHOUT COMPLICATIONS: ICD-10-CM

## 2023-01-01 DIAGNOSIS — Z79.82 LONG TERM (CURRENT) USE OF ASPIRIN: ICD-10-CM

## 2023-01-01 LAB
ALBUMIN SERPL ELPH-MCNC: 4.4 G/DL — SIGNIFICANT CHANGE UP (ref 3.5–5.2)
ALP SERPL-CCNC: 65 U/L — SIGNIFICANT CHANGE UP (ref 30–115)
ALT FLD-CCNC: 16 U/L — SIGNIFICANT CHANGE UP (ref 0–41)
ANION GAP SERPL CALC-SCNC: 12 MMOL/L — SIGNIFICANT CHANGE UP (ref 7–14)
APTT BLD: 32.5 SEC — SIGNIFICANT CHANGE UP (ref 27–39.2)
AST SERPL-CCNC: 18 U/L — SIGNIFICANT CHANGE UP (ref 0–41)
BASOPHILS # BLD AUTO: 0.03 K/UL — SIGNIFICANT CHANGE UP (ref 0–0.2)
BASOPHILS NFR BLD AUTO: 0.3 % — SIGNIFICANT CHANGE UP (ref 0–1)
BILIRUB SERPL-MCNC: 0.3 MG/DL — SIGNIFICANT CHANGE UP (ref 0.2–1.2)
BUN SERPL-MCNC: 28 MG/DL — HIGH (ref 10–20)
CALCIUM SERPL-MCNC: 9.6 MG/DL — SIGNIFICANT CHANGE UP (ref 8.4–10.5)
CHLORIDE SERPL-SCNC: 104 MMOL/L — SIGNIFICANT CHANGE UP (ref 98–110)
CO2 SERPL-SCNC: 25 MMOL/L — SIGNIFICANT CHANGE UP (ref 17–32)
CREAT SERPL-MCNC: 1.4 MG/DL — SIGNIFICANT CHANGE UP (ref 0.7–1.5)
EGFR: 47 ML/MIN/1.73M2 — LOW
EOSINOPHIL # BLD AUTO: 0.24 K/UL — SIGNIFICANT CHANGE UP (ref 0–0.7)
EOSINOPHIL NFR BLD AUTO: 2.4 % — SIGNIFICANT CHANGE UP (ref 0–8)
GLUCOSE SERPL-MCNC: 121 MG/DL — HIGH (ref 70–99)
HCT VFR BLD CALC: 38.6 % — LOW (ref 42–52)
HGB BLD-MCNC: 12.3 G/DL — LOW (ref 14–18)
IMM GRANULOCYTES NFR BLD AUTO: 0.7 % — HIGH (ref 0.1–0.3)
INR BLD: 0.87 RATIO — SIGNIFICANT CHANGE UP (ref 0.65–1.3)
LYMPHOCYTES # BLD AUTO: 1.75 K/UL — SIGNIFICANT CHANGE UP (ref 1.2–3.4)
LYMPHOCYTES # BLD AUTO: 17.4 % — LOW (ref 20.5–51.1)
MCHC RBC-ENTMCNC: 31.9 G/DL — LOW (ref 32–37)
MCHC RBC-ENTMCNC: 31.9 PG — HIGH (ref 27–31)
MCV RBC AUTO: 100 FL — HIGH (ref 80–94)
MONOCYTES # BLD AUTO: 0.91 K/UL — HIGH (ref 0.1–0.6)
MONOCYTES NFR BLD AUTO: 9.1 % — SIGNIFICANT CHANGE UP (ref 1.7–9.3)
NEUTROPHILS # BLD AUTO: 7.03 K/UL — HIGH (ref 1.4–6.5)
NEUTROPHILS NFR BLD AUTO: 70.1 % — SIGNIFICANT CHANGE UP (ref 42.2–75.2)
NRBC # BLD: 0 /100 WBCS — SIGNIFICANT CHANGE UP (ref 0–0)
PLATELET # BLD AUTO: 96 K/UL — LOW (ref 130–400)
POTASSIUM SERPL-MCNC: 4.4 MMOL/L — SIGNIFICANT CHANGE UP (ref 3.5–5)
POTASSIUM SERPL-SCNC: 4.4 MMOL/L — SIGNIFICANT CHANGE UP (ref 3.5–5)
PROT SERPL-MCNC: 7.4 G/DL — SIGNIFICANT CHANGE UP (ref 6–8)
PROTHROM AB SERPL-ACNC: 9.9 SEC — LOW (ref 9.95–12.87)
RBC # BLD: 3.86 M/UL — LOW (ref 4.7–6.1)
RBC # FLD: 13.6 % — SIGNIFICANT CHANGE UP (ref 11.5–14.5)
SODIUM SERPL-SCNC: 141 MMOL/L — SIGNIFICANT CHANGE UP (ref 135–146)
WBC # BLD: 10.03 K/UL — SIGNIFICANT CHANGE UP (ref 4.8–10.8)
WBC # FLD AUTO: 10.03 K/UL — SIGNIFICANT CHANGE UP (ref 4.8–10.8)

## 2023-01-01 PROCEDURE — 73060 X-RAY EXAM OF HUMERUS: CPT | Mod: 26,LT

## 2023-01-01 PROCEDURE — 71260 CT THORAX DX C+: CPT | Mod: 26,MA

## 2023-01-01 PROCEDURE — 71045 X-RAY EXAM CHEST 1 VIEW: CPT | Mod: 26

## 2023-01-01 PROCEDURE — 74177 CT ABD & PELVIS W/CONTRAST: CPT | Mod: 26,MA

## 2023-01-01 PROCEDURE — 99285 EMERGENCY DEPT VISIT HI MDM: CPT | Mod: FS

## 2023-01-01 PROCEDURE — 72125 CT NECK SPINE W/O DYE: CPT | Mod: 26,MA

## 2023-01-01 PROCEDURE — 70486 CT MAXILLOFACIAL W/O DYE: CPT | Mod: 26,MA

## 2023-01-01 PROCEDURE — 70450 CT HEAD/BRAIN W/O DYE: CPT | Mod: 26,MA

## 2023-01-01 NOTE — ED PROVIDER NOTE - CLINICAL SUMMARY MEDICAL DECISION MAKING FREE TEXT BOX
89yM on aspirin p/w mechanical fall while walking up stairs w/ his walker.  Pt c/o face and forearm injury.  Imaging c/w depressed nasal fx but no arm or intracranial injury.  Labs reassuring.  No airway concern or epistaxis.  Recommend supportive care, PO abx, o/p ENT f/u, return precautions.

## 2023-01-01 NOTE — ED PROVIDER NOTE - OBJECTIVE STATEMENT
89 y/o male on daily asa presents to the ED s/p fall this am while ambulating up the stairs with his walker. patient denies any LOC, headache. pt without any neck pain. pt c/o left upper arm pain and left lower ribs. no sob, chest pain. no abdominal pain or vomiting. patient ambulatory after fall. no dizziness. patient with large swelling and bruising to nasal bridge. no epistaxis

## 2023-01-01 NOTE — ED PROVIDER NOTE - CARE PLAN
Principal Discharge DX:	Nasal bone fracture  Secondary Diagnosis:	Closed head injury  Secondary Diagnosis:	Accidental fall   1

## 2023-01-01 NOTE — ED PROVIDER NOTE - PHYSICAL EXAMINATION
Vital Signs: I have reviewed the initial vital signs.  Constitutional: well-nourished, no acute distress, normocephalic  Eyes: PERRLA, EOMI clear conjunctiva, no orbital tenderness, no step off bony   ENT: MMM, no septal hematoma +tenderness to nasal bridge, no dental injury   Cardiovascular: regular rate, regular rhythm, no murmur appreciated  Respiratory: unlabored respiratory effort, clear to auscultation bilaterally, no chest wall crepitation or step off   Gastrointestinal: soft, non-tender, non-distended  abdomen, no pulsatile mass  Musculoskeletal: supple neck, no cervical tenderness, no lower extremity edema, no bony tenderness, good rom of left upper arm , good supination and pronation , pulses in tact   Integumentary: bruising and abrasion to nasal bridge   Neurologic: awake, alert, cranial nerves II-XII grossly intact, extremities’ motor and sensory functions grossly intact, no focal deficits

## 2023-01-01 NOTE — ED PROVIDER NOTE - PATIENT PORTAL LINK FT
You can access the FollowMyHealth Patient Portal offered by [facility] by registering at the following website: [email or web address]. By joining [employer]’s FollowMyHealth portal, you will also be able to view your health information using other applications (apps) compatible with our system.

## 2023-01-01 NOTE — ED ADULT TRIAGE NOTE - CHIEF COMPLAINT QUOTE
per family "he fell yesterday. He was trying to use the bathroom and lost his footing with his walker. Pt c/o nose pain and left arm pain" Pt on aspirin

## 2023-01-01 NOTE — ED PROVIDER NOTE - NSFOLLOWUPINSTRUCTIONS_ED_ALL_ED_FT
Nasal Fracture    WHAT YOU NEED TO KNOW:    What is a nasal fracture? A nasal fracture is a crack or break in your nose. You may have a break in the upper nose (bridge), the side, or the septum. The septum is in the middle of the nose and divides your nostrils.    What are the signs and symptoms of a nasal fracture?   •Pain and swelling  •Nosebleed  •Deformed nose  •Crackling sound when you touch or move your nose  •Bruising on your nose or under your eyes    How is a nasal fracture diagnosed? Your healthcare provider will ask you when, where, and how the injury occurred. You may need any of the following:   •A nasal exam will be done to check your injury. You will be given pain medicine before your healthcare provider touches and looks at the outside and inside of your nose. He or she will remove blood clots and check for hematomas (collections of blood).  Septal Hematoma   •An x-ray or CT may show the nasal fracture. You may be given contrast liquid before the scan. Tell the healthcare provider if you have ever had an allergic reaction to contrast liquid.    How is a nasal fracture treated?   •Medicine may be given to decrease pain or help prevent a bacterial infection. Ask how to take pain medicine safely. Medicine may also be given to decrease nasal swelling and help make breathing easier.  •Wound care may help stop bleeding. If you have a hematoma inside your nose, it will be drained. Healthcare providers may place packing (gauze or other material) inside your nose to soak up blood.  •Closed reduction may be done to put your nasal bones back into the correct position. Local or general anesthesia is used during this procedure. This procedure may be done right away or several days after your injury when the swelling has gone down. Surgery (open reduction) to put your bones back into place may be needed for severe fractures.  •Splints or packing help keep your nose in place for 7 to 10 days after a reduction. Ask your healthcare provider how to care for your wounds, splint, or packing.    How do I care for my nasal fracture at home?   •Apply ice on your nose for 15 to 20 minutes every hour or as directed. Use an ice pack, or put crushed ice in a plastic bag. Cover it with a towel. Ice helps prevent tissue damage and decreases swelling and pain.  •Elevate your head when you lie down. This will help decrease swelling and pain. You may need to see a specialist 3 to 5 days later for tests or more treatment after swelling has gone down.  •Protect your nose to prevent bleeding, bruising, or another fracture. Try not to bump your nose on anything. You may not be able to play sports for up to 6 weeks.    When should I seek immediate care?   •You feel like one or both of your nasal passages are blocked and you have trouble breathing.  •Clear fluid is leaking from your nose.  •You have severe nose pain, even after you take medicine.  •You have double vision or have problems moving your eyes.    When should I call my doctor?   •You have a fever.  •You continue to have nosebleeds.  •You have a headache that gets worse, even after you take pain medicine.  •Your splint or packing is loose.  •You have questions or concerns about your condition or care.    1Fall Prevention in the Home    Falls can cause injuries. They can happen to people of all ages. There are many things you can do to make your home safe and to help prevent falls.     WHAT CAN I DO ON THE OUTSIDE OF MY HOME?  Regularly fix the edges of walkways and driveways and fix any cracks.  Remove anything that might make you trip as you walk through a door, such as a raised step or threshold.  Trim any bushes or trees on the path to your home.  Use bright outdoor lighting.  Clear any walking paths of anything that might make someone trip, such as rocks or tools.  Regularly check to see if handrails are loose or broken. Make sure that both sides of any steps have handrails.  Any raised decks and porches should have guardrails on the edges.  Have any leaves, snow, or ice cleared regularly.  Use sand or salt on walking paths during winter.  Clean up any spills in your garage right away. This includes oil or grease spills.    WHAT CAN I DO IN THE BATHROOM?  Use night lights.  Install grab bars by the toilet and in the tub and shower. Do not use towel bars as grab bars.  Use non-skid mats or decals in the tub or shower.  If you need to sit down in the shower, use a plastic, non-slip stool.  Keep the floor dry. Clean up any water that spills on the floor as soon as it happens.  Remove soap buildup in the tub or shower regularly.  Attach bath mats securely with double-sided non-slip rug tape.  Do not have throw rugs and other things on the floor that can make you trip.    WHAT CAN I DO IN THE BEDROOM?  Use night lights.  Make sure that you have a light by your bed that is easy to reach.  Do not use any sheets or blankets that are too big for your bed. They should not hang down onto the floor.  Have a firm chair that has side arms. You can use this for support while you get dressed.  Do not have throw rugs and other things on the floor that can make you trip.    WHAT CAN I DO IN THE KITCHEN?  Clean up any spills right away.  Avoid walking on wet floors.  Keep items that you use a lot in easy-to-reach places.  If you need to reach something above you, use a strong step stool that has a grab bar.  Keep electrical cords out of the way.  Do not use floor polish or wax that makes floors slippery. If you must use wax, use non-skid floor wax.  Do not have throw rugs and other things on the floor that can make you trip.    WHAT CAN I DO WITH MY STAIRS?  Do not leave any items on the stairs.  Make sure that there are handrails on both sides of the stairs and use them. Fix handrails that are broken or loose. Make sure that handrails are as long as the stairways.  Check any carpeting to make sure that it is firmly attached to the stairs. Fix any carpet that is loose or worn.  Avoid having throw rugs at the top or bottom of the stairs. If you do have throw rugs, attach them to the floor with carpet tape.  Make sure that you have a light switch at the top of the stairs and the bottom of the stairs. If you do not have them, ask someone to add them for you.    WHAT ELSE CAN I DO TO HELP PREVENT FALLS?  Wear shoes that:  Do not have high heels.  Have rubber bottoms.  Are comfortable and fit you well.  Are closed at the toe. Do not wear sandals.  If you use a stepladder:  Make sure that it is fully opened. Do not climb a closed stepladder.  Make sure that both sides of the stepladder are locked into place.  Ask someone to hold it for you, if possible.  Clearly jeronimo and make sure that you can see:  Any grab bars or handrails.  First and last steps.  Where the edge of each step is.  Use tools that help you move around (mobility aids) if they are needed. These include:  Canes.  Walkers.  Scooters.  Crutches.  Turn on the lights when you go into a dark area. Replace any light bulbs as soon as they burn out.  Set up your furniture so you have a clear path. Avoid moving your furniture around.  If any of your floors are uneven, fix them.  If there are any pets around you, be aware of where they are.  Review your medicines with your doctor. Some medicines can make you feel dizzy. This can increase your chance of falling.    Ask your doctor what other things that you can do to help prevent falls.    ADDITIONAL NOTES AND INSTRUCTIONS    Please follow up with your Primary MD in 24-48 hr.  Seek immediate medical care for any new/worsening signs or symptoms.

## 2023-03-02 ENCOUNTER — RESULT CHARGE (OUTPATIENT)
Age: 88
End: 2023-03-02

## 2023-03-02 ENCOUNTER — APPOINTMENT (OUTPATIENT)
Dept: CARDIOLOGY | Facility: CLINIC | Age: 88
End: 2023-03-02
Payer: MEDICARE

## 2023-03-02 VITALS
SYSTOLIC BLOOD PRESSURE: 140 MMHG | HEART RATE: 66 BPM | HEIGHT: 69 IN | DIASTOLIC BLOOD PRESSURE: 70 MMHG | TEMPERATURE: 97.4 F | RESPIRATION RATE: 16 BRPM | WEIGHT: 167 LBS | BODY MASS INDEX: 24.73 KG/M2

## 2023-03-02 DIAGNOSIS — R42 DIZZINESS AND GIDDINESS: ICD-10-CM

## 2023-03-02 PROCEDURE — 99214 OFFICE O/P EST MOD 30 MIN: CPT

## 2023-03-02 PROCEDURE — 93000 ELECTROCARDIOGRAM COMPLETE: CPT

## 2023-03-02 NOTE — ASSESSMENT
[FreeTextEntry1] : 89 Y/o CAD, s/p CABG, patent grafts prior to TAVR. \par HTN mostly controlled now.\par S/p TAVR. \par Diastolic dysfunction, appears euvolemic.\par Hyperlipidemia.\par \par \par Plan:\par Continue treatment.\par Agree with HCTZ 2 times a week with home BP monitoring.\par Avoid sudden standing.\par Repeat blood work pending.\par F/u in 4 months.\par \par Eleazar Nguyễn MD\par

## 2023-03-02 NOTE — REVIEW OF SYSTEMS
[Headache] : headache [Feeling Fatigued] : feeling fatigued [Dizziness] : dizziness [Tremor] : a tremor was seen [Negative] : Gastrointestinal [Lower Ext Edema] : no extremity edema [Leg Claudication] : no intermittent leg claudication [Palpitations] : no palpitations [Syncope] : no syncope [Rash] : no rash [Numbness (Hypoesthesia)] : no numbness [Speech Disturbance] : no speech disturbance [Anxiety] : no anxiety [Easy Bleeding] : no tendency for easy bleeding [Easy Bruising] : no tendency for easy bruising

## 2023-03-02 NOTE — HISTORY OF PRESENT ILLNESS
[FreeTextEntry1] : 89 year old male with PMH of  CAD, s/p CABG at [facility] in 2011.\par Progressive AS, s/p TAVR at [place] in May 2019, followed by PPM placement.\par \par H/o DM, HTN, hyperlipidemia. Hx of getting  up quickly after getting a phone call, felt lightheaded, tripped a fell, broke 6 ribs.  Healed no more pain.  Pt is active, walks QD 2 miles  with R/w without Angina.  Pt c.o occasional needle  like pain on the L side of the chest mostly after meals that resolves on its own.\par \par Chol 129 LDL 52 Trig 125.

## 2023-03-02 NOTE — CARDIOLOGY SUMMARY
[de-identified] : 03/02/23:\par Atrial pacing 66/min, 1st degree AVB, LBBB. [de-identified] : 06/15/21:\par LVEF 64%, G2DD\par S/p TAVR, normal function, PG 23/16\par Mild MR, TR.

## 2023-03-16 ENCOUNTER — APPOINTMENT (OUTPATIENT)
Dept: CARDIOLOGY | Facility: CLINIC | Age: 88
End: 2023-03-16
Payer: MEDICARE

## 2023-03-16 ENCOUNTER — NON-APPOINTMENT (OUTPATIENT)
Age: 88
End: 2023-03-16

## 2023-03-16 PROCEDURE — 93294 REM INTERROG EVL PM/LDLS PM: CPT

## 2023-03-16 PROCEDURE — 93296 REM INTERROG EVL PM/IDS: CPT

## 2023-03-30 ENCOUNTER — APPOINTMENT (OUTPATIENT)
Dept: ORTHOPEDIC SURGERY | Facility: CLINIC | Age: 88
End: 2023-03-30
Payer: MEDICARE

## 2023-03-30 DIAGNOSIS — S46.912A STRAIN OF UNSPECIFIED MUSCLE, FASCIA AND TENDON AT SHOULDER AND UPPER ARM LEVEL, LEFT ARM, INITIAL ENCOUNTER: ICD-10-CM

## 2023-03-30 PROCEDURE — 99213 OFFICE O/P EST LOW 20 MIN: CPT

## 2023-03-30 PROCEDURE — A4565: CPT | Mod: KX

## 2023-03-30 NOTE — IMAGING
[de-identified] : Examination of the left elbow swelling is noted at the proximal forearm, no ecchymosis, no erythema.  Skin is intact.  Tenderness is noted over the proximal forearm.  Tenderness is noted to the distal bicep tendon insertion, although distal biceps is palpated.  He was able to actively flex and extend the elbow although pain and restriction of terminal flexion and extension.  Tenderness along the medial or lateral condyle.  No tenderness over the last night.\par \par [facility] a disc, spurring off the radial tuberosity is noted obvious fracture or dislocation

## 2023-03-30 NOTE — ASSESSMENT
[FreeTextEntry1] : At this time recommending conservative treatment.  Sling was provided for comfort.  Icing.  Tylenol as needed.  I will review x-rays with Dr. Yoo, to make sure no further imaging such as MRI is needed.  He will make a follow-up visit\par \par This patient was seen under the supervision of Dr. Ochoa.\par

## 2023-03-30 NOTE — HISTORY OF PRESENT ILLNESS
[de-identified] : 89-year-old male comes in today for evaluation of his left elbow and forearm pain.  Patient was going to take his socks off and felt a strain in the elbow and proximal left forearm.  Went to [facility] x-ray was done.  Has history of heart disease, diabetes.  He has a pacemaker history of cardiac bypass.

## 2023-04-10 ENCOUNTER — APPOINTMENT (OUTPATIENT)
Dept: ORTHOPEDIC SURGERY | Facility: CLINIC | Age: 88
End: 2023-04-10
Payer: MEDICARE

## 2023-04-10 VITALS — WEIGHT: 167 LBS | BODY MASS INDEX: 24.73 KG/M2 | HEIGHT: 69 IN

## 2023-04-10 PROCEDURE — 99214 OFFICE O/P EST MOD 30 MIN: CPT

## 2023-04-11 NOTE — ASSESSMENT
[FreeTextEntry1] : Patient is left-sided elbow pain and discomfort.  He cannot get an MRI.  He is going to try an anti-inflammatory and modification of activities if this does not get him better we will consider further diagnostic testing such as an ultrasound or CAT scan.  His daughter is in agreement with the plan of conservative treatment first.  No surgery is being considered.

## 2023-04-11 NOTE — PHYSICAL EXAM
[de-identified] : Patient is tender to palpation at the bicep tendon which is palpable.  Have pain with supination that is resisted.  Pain with flexion extension of the elbow there is no joint effusion or olecranon bursitis there is no redness or drainage.  There is a fullness in the elbow around the bicep tendon.

## 2023-04-11 NOTE — DATA REVIEWED
[FreeTextEntry1] : Radiographs of his left elbow from previous visits are reviewed showing calcifications around the biceps tuberosity as well as a couple of lytic areas there as well.

## 2023-04-11 NOTE — ADDENDUM
[FreeTextEntry1] : I spoke with the radiology team for further diagnostic purposes a noncontrast CAT scan would be able to be done to evaluate the elbow if necessary I spoke to his daughter regarding the same

## 2023-04-11 NOTE — HISTORY OF PRESENT ILLNESS
[de-identified] : 89-year-old male was having pain discomfort of the left elbow and he did move his elbow felt pain discomfort develop in the elbow came to the office for evaluate he was evaluated with x-ray then eventually further diagnostic testing was being considered however he has a pacemaker and is unable to get that MRI was feeling better but then pain discomfort developed worse in the elbow over the last 2 days

## 2023-05-15 ENCOUNTER — APPOINTMENT (OUTPATIENT)
Dept: ORTHOPEDIC SURGERY | Facility: CLINIC | Age: 88
End: 2023-05-15
Payer: MEDICARE

## 2023-05-15 VITALS — BODY MASS INDEX: 24.73 KG/M2 | HEIGHT: 69 IN | WEIGHT: 167 LBS

## 2023-05-15 DIAGNOSIS — M25.522 PAIN IN LEFT ELBOW: ICD-10-CM

## 2023-05-15 PROCEDURE — 73080 X-RAY EXAM OF ELBOW: CPT | Mod: LT

## 2023-05-15 PROCEDURE — 99213 OFFICE O/P EST LOW 20 MIN: CPT

## 2023-05-15 NOTE — HISTORY OF PRESENT ILLNESS
[de-identified] : 89-year-old male with left-sided elbow pain and discomfort.  He is with his elbow before.  Took some anti-inflammatories felt better.  Now he comes in with pain discomfort and some swelling in the antecubital fossa area.

## 2023-05-15 NOTE — ASSESSMENT
[FreeTextEntry1] : Patient has a swelling and deformity across the left antecubital fossa.  It is tender to palpation.  X-rays are not diagnostic.  He cannot have an MRI done.  I went to get a CAT scan of his elbow for further evaluation and the family will contact me back after testing is performed

## 2023-05-15 NOTE — DATA REVIEWED
[FreeTextEntry1] : Radiographs 3 views of the left elbow reviewed showing no destructive lesions there is abnormality around the biceps tuberosity.

## 2023-05-15 NOTE — PHYSICAL EXAM
[de-identified] : Patient is swelling in the antecubital fossa tenderness to palpation around the biceps insertion.  He has good range of motion to the elbow.  With no rotational abnormality.

## 2023-05-16 ENCOUNTER — RESULT REVIEW (OUTPATIENT)
Age: 88
End: 2023-05-16

## 2023-05-16 ENCOUNTER — OUTPATIENT (OUTPATIENT)
Dept: OUTPATIENT SERVICES | Facility: HOSPITAL | Age: 88
LOS: 1 days | End: 2023-05-16
Payer: MEDICARE

## 2023-05-16 DIAGNOSIS — M25.522 PAIN IN LEFT ELBOW: ICD-10-CM

## 2023-05-16 DIAGNOSIS — Z00.8 ENCOUNTER FOR OTHER GENERAL EXAMINATION: ICD-10-CM

## 2023-05-16 PROCEDURE — 73200 CT UPPER EXTREMITY W/O DYE: CPT | Mod: 26,LT,MH

## 2023-05-16 PROCEDURE — 73200 CT UPPER EXTREMITY W/O DYE: CPT | Mod: LT

## 2023-05-17 DIAGNOSIS — M25.522 PAIN IN LEFT ELBOW: ICD-10-CM

## 2023-05-22 ENCOUNTER — NON-APPOINTMENT (OUTPATIENT)
Age: 88
End: 2023-05-22

## 2023-06-15 ENCOUNTER — NON-APPOINTMENT (OUTPATIENT)
Age: 88
End: 2023-06-15

## 2023-06-15 ENCOUNTER — APPOINTMENT (OUTPATIENT)
Dept: CARDIOLOGY | Facility: CLINIC | Age: 88
End: 2023-06-15
Payer: MEDICARE

## 2023-06-15 PROCEDURE — 93296 REM INTERROG EVL PM/IDS: CPT

## 2023-06-15 PROCEDURE — 93294 REM INTERROG EVL PM/LDLS PM: CPT

## 2023-07-27 ENCOUNTER — APPOINTMENT (OUTPATIENT)
Dept: CARDIOLOGY | Facility: CLINIC | Age: 88
End: 2023-07-27
Payer: MEDICARE

## 2023-07-27 ENCOUNTER — RESULT CHARGE (OUTPATIENT)
Age: 88
End: 2023-07-27

## 2023-07-27 VITALS
HEIGHT: 69 IN | WEIGHT: 163 LBS | BODY MASS INDEX: 24.14 KG/M2 | SYSTOLIC BLOOD PRESSURE: 122 MMHG | DIASTOLIC BLOOD PRESSURE: 62 MMHG | HEART RATE: 61 BPM

## 2023-07-27 PROCEDURE — 93000 ELECTROCARDIOGRAM COMPLETE: CPT

## 2023-07-27 PROCEDURE — 99213 OFFICE O/P EST LOW 20 MIN: CPT

## 2023-07-27 RX ORDER — GLIPIZIDE 5 MG/1
5 TABLET, EXTENDED RELEASE ORAL DAILY
Refills: 0 | Status: DISCONTINUED | COMMUNITY
End: 2023-07-27

## 2023-07-27 NOTE — HISTORY OF PRESENT ILLNESS
[FreeTextEntry1] : 89 year old male with PMH of  CAD, s/p CABG at [facility] in 2011.\par Progressive AS, s/p TAVR at [place] in May 2019, followed by PPM placement.\par \par H/o DM, HTN, hyperlipidemia. Hx of getting  up quickly after getting a phone call, felt lightheaded, tripped a fell, broke 6 ribs.  Healed no more pain.  Pt is active, walks QD 2 miles  with R/w without Angina.  Pt c.o occasional needle  like pain on the L side of the chest mostly after meals that resolves on its own.\par \par Pt presents for a follow up visit. Still has occasional chest discomfort not related to exertion otherwise reports feeling well. States he still takes daily walks. \par \par LDL 50\par A1c 6.8\par GFR 42.\par \par

## 2023-07-27 NOTE — ASSESSMENT
[FreeTextEntry1] : 89 Y/o CAD, s/p CABG, patent grafts prior to TAVR. \par HTN mostly controlled now.\par S/p TAVR. \par Diastolic dysfunction, appears euvolemic.\par Hyperlipidemia.\par \par Plan:\par Continue treatment.\par HCTZ 2 times a week with home BP monitoring.\par Avoid sudden standing.\par Repeat fasting blood work.\par F/u in 4 months.\par \par Eleazar Nguyễn MD\par

## 2023-07-27 NOTE — CARDIOLOGY SUMMARY
[de-identified] : 7/27/23:\par Atrial pacing 61/min, 1st degree AVB, LBBB [de-identified] : 06/15/21:\par LVEF 64%, G2DD\par S/p TAVR, normal function, PG 23/16\par Mild MR, TR.

## 2023-08-24 ENCOUNTER — NON-APPOINTMENT (OUTPATIENT)
Age: 88
End: 2023-08-24

## 2023-08-24 ENCOUNTER — APPOINTMENT (OUTPATIENT)
Dept: ELECTROPHYSIOLOGY | Facility: CLINIC | Age: 88
End: 2023-08-24
Payer: MEDICARE

## 2023-08-24 DIAGNOSIS — Z45.018 ENCOUNTER FOR ADJUSTMENT AND MANAGEMENT OF OTHER PART OF CARDIAC PACEMAKER: ICD-10-CM

## 2023-08-24 PROCEDURE — 93280 PM DEVICE PROGR EVAL DUAL: CPT

## 2023-08-24 RX ORDER — DORZOLAMIDE HYDROCHLORIDE 20 MG/ML
2 SOLUTION OPHTHALMIC DAILY
Refills: 0 | Status: COMPLETED | COMMUNITY
End: 2023-08-24

## 2023-08-24 RX ORDER — RANOLAZINE 500 MG/1
500 TABLET, EXTENDED RELEASE ORAL
Qty: 180 | Refills: 1 | Status: ACTIVE | COMMUNITY
Start: 2023-07-27

## 2023-08-24 RX ORDER — AMLODIPINE BESYLATE 2.5 MG/1
2.5 TABLET ORAL
Qty: 90 | Refills: 1 | Status: COMPLETED | COMMUNITY
End: 2023-08-24

## 2023-08-24 RX ORDER — DAPAGLIFLOZIN 10 MG/1
10 TABLET, FILM COATED ORAL DAILY
Qty: 90 | Refills: 3 | Status: ACTIVE | COMMUNITY
Start: 2023-07-27

## 2023-08-24 RX ORDER — LINACLOTIDE 290 UG/1
290 CAPSULE, GELATIN COATED ORAL
Qty: 30 | Refills: 0 | Status: COMPLETED | COMMUNITY
Start: 2022-07-11 | End: 2023-08-24

## 2023-08-24 RX ORDER — ACETAMINOPHEN AND CODEINE 300; 30 MG/1; MG/1
300-30 TABLET ORAL
Qty: 10 | Refills: 0 | Status: COMPLETED | COMMUNITY
Start: 2022-04-05 | End: 2023-08-24

## 2023-08-24 RX ORDER — ROSUVASTATIN CALCIUM 5 MG/1
5 TABLET, FILM COATED ORAL DAILY
Refills: 0 | Status: ACTIVE | COMMUNITY

## 2023-08-24 RX ORDER — MELOXICAM 7.5 MG/1
7.5 TABLET ORAL
Qty: 30 | Refills: 0 | Status: COMPLETED | COMMUNITY
Start: 2022-07-28 | End: 2023-08-24

## 2023-08-24 RX ORDER — ESOMEPRAZOLE MAGNESIUM 40 MG/1
40 CAPSULE, DELAYED RELEASE ORAL DAILY
Refills: 0 | Status: COMPLETED | COMMUNITY
End: 2023-08-24

## 2023-08-24 RX ORDER — TAMSULOSIN HYDROCHLORIDE 0.4 MG/1
0.4 CAPSULE ORAL
Qty: 90 | Refills: 0 | Status: ACTIVE | COMMUNITY

## 2023-08-24 RX ORDER — CHROMIUM 200 MCG
TABLET ORAL
Refills: 0 | Status: ACTIVE | COMMUNITY

## 2023-08-24 RX ORDER — ASPIRIN 81 MG
81 TABLET, DELAYED RELEASE (ENTERIC COATED) ORAL DAILY
Refills: 0 | Status: ACTIVE | COMMUNITY

## 2023-08-24 RX ORDER — HYDROCHLOROTHIAZIDE 12.5 MG/1
12.5 TABLET ORAL
Qty: 90 | Refills: 2 | Status: COMPLETED | COMMUNITY
End: 2023-08-24

## 2023-08-24 RX ORDER — TRAVOPROST 0.04 MG/ML
0 SOLUTION/ DROPS OPHTHALMIC
Qty: 5 | Refills: 0 | Status: COMPLETED | COMMUNITY
Start: 2020-06-29 | End: 2023-08-24

## 2023-08-24 RX ORDER — FINASTERIDE 5 MG/1
5 TABLET, FILM COATED ORAL
Qty: 30 | Refills: 0 | Status: ACTIVE | COMMUNITY
Start: 2022-07-18

## 2023-08-24 RX ORDER — OMEPRAZOLE 40 MG/1
40 CAPSULE, DELAYED RELEASE ORAL
Qty: 30 | Refills: 0 | Status: COMPLETED | COMMUNITY
Start: 2020-06-01 | End: 2023-08-24

## 2023-08-24 RX ORDER — LOSARTAN POTASSIUM 50 MG/1
50 TABLET, FILM COATED ORAL DAILY
Qty: 90 | Refills: 1 | Status: COMPLETED | COMMUNITY
End: 2023-08-24

## 2023-08-24 RX ORDER — MELOXICAM 7.5 MG/1
7.5 TABLET ORAL
Qty: 30 | Refills: 0 | Status: COMPLETED | COMMUNITY
Start: 2023-04-10 | End: 2023-08-24

## 2023-08-24 RX ORDER — ZOLPIDEM TARTRATE 5 MG/1
5 TABLET ORAL
Refills: 0 | Status: COMPLETED | COMMUNITY
End: 2023-08-24

## 2023-08-24 RX ORDER — MULTIVITAMIN
TABLET ORAL DAILY
Qty: 90 | Refills: 1 | Status: ACTIVE | COMMUNITY
Start: 2021-12-09

## 2023-08-24 RX ORDER — PROPRANOLOL HYDROCHLORIDE 40 MG/1
40 TABLET ORAL
Refills: 0 | Status: ACTIVE | COMMUNITY
Start: 2020-08-03

## 2023-08-24 RX ORDER — IPRATROPIUM BROMIDE 21 UG/1
0.03 SPRAY NASAL
Qty: 30 | Refills: 0 | Status: COMPLETED | COMMUNITY
Start: 2022-10-07 | End: 2023-08-24

## 2023-09-14 ENCOUNTER — APPOINTMENT (OUTPATIENT)
Dept: ELECTROPHYSIOLOGY | Facility: CLINIC | Age: 88
End: 2023-09-14

## 2023-11-09 ENCOUNTER — APPOINTMENT (OUTPATIENT)
Dept: CARDIOLOGY | Facility: CLINIC | Age: 88
End: 2023-11-09
Payer: MEDICARE

## 2023-11-09 VITALS
HEIGHT: 69 IN | BODY MASS INDEX: 24.29 KG/M2 | HEART RATE: 60 BPM | SYSTOLIC BLOOD PRESSURE: 122 MMHG | DIASTOLIC BLOOD PRESSURE: 58 MMHG | WEIGHT: 164 LBS

## 2023-11-09 PROCEDURE — 93000 ELECTROCARDIOGRAM COMPLETE: CPT

## 2023-11-09 PROCEDURE — 99214 OFFICE O/P EST MOD 30 MIN: CPT

## 2023-11-09 RX ORDER — HYDROCHLOROTHIAZIDE 12.5 MG/1
12.5 CAPSULE ORAL
Refills: 0 | Status: ACTIVE | COMMUNITY

## 2023-11-09 RX ORDER — ISOSORBIDE MONONITRATE 120 MG/1
120 TABLET, EXTENDED RELEASE ORAL
Qty: 90 | Refills: 1 | Status: DISCONTINUED | COMMUNITY
Start: 2020-08-03 | End: 2023-11-09

## 2023-11-09 RX ORDER — ISOSORBIDE MONONITRATE 60 MG/1
60 TABLET, EXTENDED RELEASE ORAL DAILY
Refills: 0 | Status: ACTIVE | COMMUNITY

## 2023-11-09 RX ORDER — PIOGLITAZONE HYDROCHLORIDE 15 MG/1
15 TABLET ORAL DAILY
Qty: 90 | Refills: 0 | Status: DISCONTINUED | COMMUNITY
Start: 2023-07-27 | End: 2023-11-09

## 2023-11-09 RX ORDER — NITROGLYCERIN 0.3 MG/1
0.3 TABLET SUBLINGUAL
Qty: 30 | Refills: 3 | Status: DISCONTINUED | COMMUNITY
End: 2023-11-09

## 2023-12-13 ENCOUNTER — APPOINTMENT (OUTPATIENT)
Dept: CARDIOLOGY | Facility: CLINIC | Age: 88
End: 2023-12-13
Payer: MEDICARE

## 2023-12-13 ENCOUNTER — NON-APPOINTMENT (OUTPATIENT)
Age: 88
End: 2023-12-13

## 2023-12-13 PROCEDURE — 93296 REM INTERROG EVL PM/IDS: CPT

## 2023-12-13 PROCEDURE — 93294 REM INTERROG EVL PM/LDLS PM: CPT

## 2024-01-12 ENCOUNTER — APPOINTMENT (OUTPATIENT)
Dept: VASCULAR SURGERY | Facility: CLINIC | Age: 89
End: 2024-01-12
Payer: MEDICARE

## 2024-01-12 VITALS — HEIGHT: 69 IN | BODY MASS INDEX: 25.18 KG/M2 | WEIGHT: 170 LBS

## 2024-01-12 DIAGNOSIS — I70.0 ATHEROSCLEROSIS OF AORTA: ICD-10-CM

## 2024-01-12 PROCEDURE — 99204 OFFICE O/P NEW MOD 45 MIN: CPT

## 2024-01-12 PROCEDURE — 93925 LOWER EXTREMITY STUDY: CPT

## 2024-01-12 PROCEDURE — 93978 VASCULAR STUDY: CPT

## 2024-01-12 NOTE — CONSULT LETTER
[Dear  ___] : Dear  [unfilled], [Consult Letter:] : I had the pleasure of evaluating your patient, [unfilled]. [Please see my note below.] : Please see my note below. [FreeTextEntry2] : Dear Dr. Jess Sims,

## 2024-01-12 NOTE — ASSESSMENT
[FreeTextEntry1] : 89 y/o M with h/o DM, non-healing Right foot ulcer and foot pain.  Duplex today showed patent aortoiliac and femoropopliteal arteries, severe tibial artery disease. Popliteal and pedal pulses are not palpable on exam.  I offered him a right lower extremity angiogram. Patient understands the risks of recurrence, bleeding, infection, rethrombosis, nerve injury, wound complications, injury to the catheterized vessel and need for additional procedures.  He is in agreement to proceed.  I spent 45 minutes with patient performing physical exam, reviewing duplex results, discussing treatment plan and follow up.   I, Dr. Quiroz, personally performed the evaluation and management (E/M) services for this established patient who presents today with (a) new problem(s)/exacerbation of (an) existing condition(s). That E/M includes conducting the clinically appropriate interval history &/or exam, assessing all new/exacerbated conditions, and establishing a new plan of care. Today, my LYNN, Heidi], was here to observe my evaluation and management service for this new problem/exacerbated condition and follow the plan of care established by me going forward.  Thank you for allowing me to participate in the care of your patient.   Sincerely,   Ruben Quiroz MD, RPVI, FACS Associate Professor of Surgery , Vascular Fellowship Director of Limb Salvage Surgery [facility] at [facility]

## 2024-01-12 NOTE — DATA REVIEWED
[FreeTextEntry1] : I performed an arterial duplex which was medically necessary to evaluate for arterial insufficiency. It showed patent aortoiliac arteries without any stenosis, but with biphasic flow in the lower extremities, occlusion of PT arteries.

## 2024-01-12 NOTE — HISTORY OF PRESENT ILLNESS
[FreeTextEntry1] : 89 y/o M with h/o DM, sent in by Podiatrist for evaluation of PVD. He developed a wound to right heel several weeks ago, not healing, c/o pain to foot.

## 2024-01-19 ENCOUNTER — OUTPATIENT (OUTPATIENT)
Dept: OUTPATIENT SERVICES | Facility: HOSPITAL | Age: 89
LOS: 1 days | End: 2024-01-19
Payer: MEDICARE

## 2024-01-19 VITALS
RESPIRATION RATE: 18 BRPM | OXYGEN SATURATION: 100 % | TEMPERATURE: 98 F | SYSTOLIC BLOOD PRESSURE: 149 MMHG | HEART RATE: 82 BPM | DIASTOLIC BLOOD PRESSURE: 65 MMHG | WEIGHT: 169.76 LBS

## 2024-01-19 DIAGNOSIS — Z95.0 PRESENCE OF CARDIAC PACEMAKER: Chronic | ICD-10-CM

## 2024-01-19 DIAGNOSIS — I70.235 ATHEROSCLEROSIS OF NATIVE ARTERIES OF RIGHT LEG WITH ULCERATION OF OTHER PART OF FOOT: ICD-10-CM

## 2024-01-19 DIAGNOSIS — Z95.1 PRESENCE OF AORTOCORONARY BYPASS GRAFT: Chronic | ICD-10-CM

## 2024-01-19 DIAGNOSIS — Z01.818 ENCOUNTER FOR OTHER PREPROCEDURAL EXAMINATION: ICD-10-CM

## 2024-01-19 DIAGNOSIS — Z90.49 ACQUIRED ABSENCE OF OTHER SPECIFIED PARTS OF DIGESTIVE TRACT: Chronic | ICD-10-CM

## 2024-01-19 LAB
A1C WITH ESTIMATED AVERAGE GLUCOSE RESULT: 5.9 % — HIGH (ref 4–5.6)
ALBUMIN SERPL ELPH-MCNC: 4.3 G/DL — SIGNIFICANT CHANGE UP (ref 3.5–5.2)
ALP SERPL-CCNC: 66 U/L — SIGNIFICANT CHANGE UP (ref 30–115)
ALT FLD-CCNC: 9 U/L — SIGNIFICANT CHANGE UP (ref 0–41)
ANION GAP SERPL CALC-SCNC: 13 MMOL/L — SIGNIFICANT CHANGE UP (ref 7–14)
APTT BLD: 33.2 SEC — SIGNIFICANT CHANGE UP (ref 27–39.2)
AST SERPL-CCNC: 12 U/L — SIGNIFICANT CHANGE UP (ref 0–41)
BASOPHILS # BLD AUTO: 0.02 K/UL — SIGNIFICANT CHANGE UP (ref 0–0.2)
BASOPHILS NFR BLD AUTO: 0.2 % — SIGNIFICANT CHANGE UP (ref 0–1)
BILIRUB SERPL-MCNC: 0.4 MG/DL — SIGNIFICANT CHANGE UP (ref 0.2–1.2)
BUN SERPL-MCNC: 23 MG/DL — HIGH (ref 10–20)
CALCIUM SERPL-MCNC: 9.6 MG/DL — SIGNIFICANT CHANGE UP (ref 8.4–10.5)
CHLORIDE SERPL-SCNC: 101 MMOL/L — SIGNIFICANT CHANGE UP (ref 98–110)
CO2 SERPL-SCNC: 24 MMOL/L — SIGNIFICANT CHANGE UP (ref 17–32)
CREAT SERPL-MCNC: 1.2 MG/DL — SIGNIFICANT CHANGE UP (ref 0.7–1.5)
EGFR: 56 ML/MIN/1.73M2 — LOW
EOSINOPHIL # BLD AUTO: 0.18 K/UL — SIGNIFICANT CHANGE UP (ref 0–0.7)
EOSINOPHIL NFR BLD AUTO: 2 % — SIGNIFICANT CHANGE UP (ref 0–8)
ESTIMATED AVERAGE GLUCOSE: 123 MG/DL — HIGH (ref 68–114)
GLUCOSE SERPL-MCNC: 132 MG/DL — HIGH (ref 70–99)
HCT VFR BLD CALC: 36.9 % — LOW (ref 42–52)
HGB BLD-MCNC: 11.8 G/DL — LOW (ref 14–18)
IMM GRANULOCYTES NFR BLD AUTO: 0.4 % — HIGH (ref 0.1–0.3)
INR BLD: 0.95 RATIO — SIGNIFICANT CHANGE UP (ref 0.65–1.3)
LYMPHOCYTES # BLD AUTO: 1.42 K/UL — SIGNIFICANT CHANGE UP (ref 1.2–3.4)
LYMPHOCYTES # BLD AUTO: 15.9 % — LOW (ref 20.5–51.1)
MCHC RBC-ENTMCNC: 32 G/DL — SIGNIFICANT CHANGE UP (ref 32–37)
MCHC RBC-ENTMCNC: 32.7 PG — HIGH (ref 27–31)
MCV RBC AUTO: 102.2 FL — HIGH (ref 80–94)
MONOCYTES # BLD AUTO: 0.77 K/UL — HIGH (ref 0.1–0.6)
MONOCYTES NFR BLD AUTO: 8.6 % — SIGNIFICANT CHANGE UP (ref 1.7–9.3)
NEUTROPHILS # BLD AUTO: 6.52 K/UL — HIGH (ref 1.4–6.5)
NEUTROPHILS NFR BLD AUTO: 72.9 % — SIGNIFICANT CHANGE UP (ref 42.2–75.2)
NRBC # BLD: 0 /100 WBCS — SIGNIFICANT CHANGE UP (ref 0–0)
PLATELET # BLD AUTO: 109 K/UL — LOW (ref 130–400)
PMV BLD: 11.4 FL — HIGH (ref 7.4–10.4)
POTASSIUM SERPL-MCNC: 4.5 MMOL/L — SIGNIFICANT CHANGE UP (ref 3.5–5)
POTASSIUM SERPL-SCNC: 4.5 MMOL/L — SIGNIFICANT CHANGE UP (ref 3.5–5)
PROT SERPL-MCNC: 6.4 G/DL — SIGNIFICANT CHANGE UP (ref 6–8)
PROTHROM AB SERPL-ACNC: 10.8 SEC — SIGNIFICANT CHANGE UP (ref 9.95–12.87)
RBC # BLD: 3.61 M/UL — LOW (ref 4.7–6.1)
RBC # FLD: 14.3 % — SIGNIFICANT CHANGE UP (ref 11.5–14.5)
SODIUM SERPL-SCNC: 138 MMOL/L — SIGNIFICANT CHANGE UP (ref 135–146)
WBC # BLD: 8.95 K/UL — SIGNIFICANT CHANGE UP (ref 4.8–10.8)
WBC # FLD AUTO: 8.95 K/UL — SIGNIFICANT CHANGE UP (ref 4.8–10.8)

## 2024-01-19 PROCEDURE — 85025 COMPLETE CBC W/AUTO DIFF WBC: CPT

## 2024-01-19 PROCEDURE — 80053 COMPREHEN METABOLIC PANEL: CPT

## 2024-01-19 PROCEDURE — 93005 ELECTROCARDIOGRAM TRACING: CPT

## 2024-01-19 PROCEDURE — 85610 PROTHROMBIN TIME: CPT

## 2024-01-19 PROCEDURE — 83036 HEMOGLOBIN GLYCOSYLATED A1C: CPT

## 2024-01-19 PROCEDURE — 99214 OFFICE O/P EST MOD 30 MIN: CPT | Mod: 25

## 2024-01-19 PROCEDURE — 93010 ELECTROCARDIOGRAM REPORT: CPT

## 2024-01-19 PROCEDURE — 36415 COLL VENOUS BLD VENIPUNCTURE: CPT

## 2024-01-19 PROCEDURE — 85730 THROMBOPLASTIN TIME PARTIAL: CPT

## 2024-01-19 NOTE — H&P PST ADULT - HISTORY OF PRESENT ILLNESS
Patient is a 89 year old male presenting to PAST in preparation for   RIGHT LEG ANGIOGRAM POSSIBLE ENDOVASCULAR REVASCULARIZATION on 1/30 under lsb anesthesia by Dr. Quiroz.  Pt with h/o pvd and dm developed a ulcer to right heel The ulcer is not healing and has been advised to have above  PATIENT CURRENTLY DENIES CHEST PAIN  SHORTNESS OF BREATH  PALPITATIONS,  DYSURIA, OR UPPER RESPIRATORY INFECTION IN PAST 2 WEEKS    Anesthesia Alert  NO--Difficult Airway  NO--History of neck surgery or radiation  NO--Limited ROM of neck  NO--History of Malignant hyperthermia  NO--Personal or family history of Pseudocholinesterase deficiency  NO--Prior Anesthesia Complication  NO--Latex Allergy  yes--Loose teeth ( upper and lower dentures)  NO--History of Rheumatoid Arthritis  NO--VIVIENNE  NO-- BLEEDING RISK  NO--Other_____        Duke Activity Status Index (DASI) from [employer]  on 1/19/2024      RESULT SUMMARY:  0 points  The higher the score (maximum 58.2), the higher the functional status.    2.74 METs        INPUTS:  Take care of self —> 0 = No  Walk indoors —> 0 = No  Walk 1&ndash;2 blocks on level ground —> 0 = No  Climb a flight of stairs or walk up a hill —> 0 = No  Run a short distance —> 0 = No  Do light work around the house —> 0 = No  Do moderate work around the house —> 0 = No  Do heavy work around the house —> 0 = No  Do yardwork —> 0 = No  Have sexual relations —> 0 = No  Participate in moderate recreational activities —> 0 = No  Participate in strenuous sports —> 0 = No      Revised Cardiac Risk Index for Pre-Operative Risk from [employer]  on 1/19/2024      RESULT SUMMARY:  0 points  Class I Risk    3.9 %  30-day risk of death, MI, or cardiac arrest    From Duceppe 2017. These numbers are higher than those from the original study (Calderon 1999). See Evidence for details.      INPUTS:  Elevated-risk surgery —> 0 = No  History of ischemic heart disease —> 0 = No  History of congestive heart failure —> 0 = No  History of cerebrovascular disease —> 0 = No  Pre-operative treatment with insulin —> 0 = No  Pre-operative creatinine >2 mg/dL / 176.8 µmol/L —> 0 = No      As per patient, this is their complete medical and surgical history, including medications both prescribed or over the counter.  Patient verbalized understanding of instructions and was given the opportunity to ask questions and have them answered.

## 2024-01-19 NOTE — H&P PST ADULT - SOURCE OF INFORMATION, PROFILE
Pt here for labs with RN review. INR 2.9 today. Pt confirms previous dose of 11.25mg daily. She denies new medications or missed doses. Per [facility] dose will remain the same. Copy of summary given to pt, f/u reviewed. Pt v/u.  
patient

## 2024-01-19 NOTE — H&P PST ADULT - NSICDXPASTMEDICALHX_GEN_ALL_CORE_FT
PAST MEDICAL HISTORY:  Diabetes     H/O ischemic heart disease     HTN (hypertension)     Hypercholesteremia     Pacemaker     Prostate disorder     PVD (peripheral vascular disease)

## 2024-01-19 NOTE — H&P PST ADULT - NSICDXFAMILYHX_GEN_ALL_CORE_FT
FAMILY HISTORY:  Mother  Still living? No  Family history of CVA, Age at diagnosis: Age Unknown  FH: CAD (coronary artery disease), Age at diagnosis: Age Unknown

## 2024-01-19 NOTE — H&P PST ADULT - NSICDXPASTSURGICALHX_GEN_ALL_CORE_FT
PAST SURGICAL HISTORY:  History of cholecystectomy     S/P CABG (coronary artery bypass graft)     S/P placement of cardiac pacemaker

## 2024-01-20 DIAGNOSIS — I70.235 ATHEROSCLEROSIS OF NATIVE ARTERIES OF RIGHT LEG WITH ULCERATION OF OTHER PART OF FOOT: ICD-10-CM

## 2024-01-20 DIAGNOSIS — Z01.818 ENCOUNTER FOR OTHER PREPROCEDURAL EXAMINATION: ICD-10-CM

## 2024-01-29 NOTE — ASU PATIENT PROFILE, ADULT - FALL HARM RISK - RISK INTERVENTIONS

## 2024-01-30 ENCOUNTER — APPOINTMENT (OUTPATIENT)
Dept: VASCULAR SURGERY | Facility: HOSPITAL | Age: 89
End: 2024-01-30

## 2024-01-30 ENCOUNTER — TRANSCRIPTION ENCOUNTER (OUTPATIENT)
Age: 89
End: 2024-01-30

## 2024-01-30 ENCOUNTER — OUTPATIENT (OUTPATIENT)
Dept: OUTPATIENT SERVICES | Facility: HOSPITAL | Age: 89
LOS: 1 days | Discharge: HOME | End: 2024-01-30
Payer: MEDICARE

## 2024-01-30 VITALS
HEIGHT: 70 IN | RESPIRATION RATE: 17 BRPM | TEMPERATURE: 98 F | DIASTOLIC BLOOD PRESSURE: 66 MMHG | WEIGHT: 169.76 LBS | SYSTOLIC BLOOD PRESSURE: 152 MMHG | HEART RATE: 60 BPM | OXYGEN SATURATION: 99 %

## 2024-01-30 VITALS
RESPIRATION RATE: 16 BRPM | DIASTOLIC BLOOD PRESSURE: 84 MMHG | HEART RATE: 61 BPM | OXYGEN SATURATION: 99 % | SYSTOLIC BLOOD PRESSURE: 138 MMHG

## 2024-01-30 DIAGNOSIS — I70.235 ATHEROSCLEROSIS OF NATIVE ARTERIES OF RIGHT LEG WITH ULCERATION OF OTHER PART OF FOOT: ICD-10-CM

## 2024-01-30 DIAGNOSIS — E11.51 TYPE 2 DIABETES MELLITUS WITH DIABETIC PERIPHERAL ANGIOPATHY WITHOUT GANGRENE: ICD-10-CM

## 2024-01-30 DIAGNOSIS — I25.9 CHRONIC ISCHEMIC HEART DISEASE, UNSPECIFIED: ICD-10-CM

## 2024-01-30 DIAGNOSIS — Z95.0 PRESENCE OF CARDIAC PACEMAKER: Chronic | ICD-10-CM

## 2024-01-30 DIAGNOSIS — E78.00 PURE HYPERCHOLESTEROLEMIA, UNSPECIFIED: ICD-10-CM

## 2024-01-30 DIAGNOSIS — Z95.1 PRESENCE OF AORTOCORONARY BYPASS GRAFT: Chronic | ICD-10-CM

## 2024-01-30 DIAGNOSIS — Z79.82 LONG TERM (CURRENT) USE OF ASPIRIN: ICD-10-CM

## 2024-01-30 DIAGNOSIS — I10 ESSENTIAL (PRIMARY) HYPERTENSION: ICD-10-CM

## 2024-01-30 DIAGNOSIS — Z79.84 LONG TERM (CURRENT) USE OF ORAL HYPOGLYCEMIC DRUGS: ICD-10-CM

## 2024-01-30 DIAGNOSIS — Z95.1 PRESENCE OF AORTOCORONARY BYPASS GRAFT: ICD-10-CM

## 2024-01-30 DIAGNOSIS — N40.0 BENIGN PROSTATIC HYPERPLASIA WITHOUT LOWER URINARY TRACT SYMPTOMS: ICD-10-CM

## 2024-01-30 DIAGNOSIS — Z90.49 ACQUIRED ABSENCE OF OTHER SPECIFIED PARTS OF DIGESTIVE TRACT: Chronic | ICD-10-CM

## 2024-01-30 DIAGNOSIS — Z95.0 PRESENCE OF CARDIAC PACEMAKER: ICD-10-CM

## 2024-01-30 LAB
GLUCOSE BLDC GLUCOMTR-MCNC: 114 MG/DL — HIGH (ref 70–99)
GLUCOSE BLDC GLUCOMTR-MCNC: 97 MG/DL — SIGNIFICANT CHANGE UP (ref 70–99)

## 2024-01-30 PROCEDURE — 37229: CPT | Mod: RT

## 2024-01-30 PROCEDURE — C1760: CPT

## 2024-01-30 PROCEDURE — C1725: CPT

## 2024-01-30 PROCEDURE — C1887: CPT

## 2024-01-30 PROCEDURE — 37225: CPT | Mod: RT

## 2024-01-30 PROCEDURE — C1769: CPT

## 2024-01-30 PROCEDURE — 36248 INS CATH ABD/L-EXT ART ADDL: CPT

## 2024-01-30 PROCEDURE — 37228: CPT | Mod: RT,59

## 2024-01-30 PROCEDURE — 75710 ARTERY X-RAYS ARM/LEG: CPT | Mod: 26,XU

## 2024-01-30 PROCEDURE — 82962 GLUCOSE BLOOD TEST: CPT

## 2024-01-30 PROCEDURE — 73551 X-RAY EXAM OF FEMUR 1: CPT | Mod: RT

## 2024-01-30 PROCEDURE — C1724: CPT

## 2024-01-30 PROCEDURE — 36247 INS CATH ABD/L-EXT ART 3RD: CPT | Mod: 59,RT

## 2024-01-30 PROCEDURE — C1894: CPT

## 2024-01-30 PROCEDURE — C2623: CPT

## 2024-01-30 PROCEDURE — 76937 US GUIDE VASCULAR ACCESS: CPT | Mod: 26

## 2024-01-30 RX ORDER — TAMSULOSIN HYDROCHLORIDE 0.4 MG/1
1 CAPSULE ORAL
Refills: 0 | DISCHARGE

## 2024-01-30 RX ORDER — SERTRALINE 25 MG/1
1 TABLET, FILM COATED ORAL
Refills: 0 | DISCHARGE

## 2024-01-30 RX ORDER — FINASTERIDE 5 MG/1
1 TABLET, FILM COATED ORAL
Refills: 0 | DISCHARGE

## 2024-01-30 RX ORDER — ACETAMINOPHEN 500 MG
975 TABLET ORAL ONCE
Refills: 0 | Status: DISCONTINUED | OUTPATIENT
Start: 2024-01-30 | End: 2024-01-30

## 2024-01-30 RX ORDER — ROSUVASTATIN CALCIUM 5 MG/1
1 TABLET ORAL
Refills: 0 | DISCHARGE

## 2024-01-30 RX ORDER — ISOSORBIDE MONONITRATE 60 MG/1
1 TABLET, EXTENDED RELEASE ORAL
Refills: 0 | DISCHARGE

## 2024-01-30 RX ORDER — ASPIRIN/CALCIUM CARB/MAGNESIUM 324 MG
0 TABLET ORAL
Refills: 0 | DISCHARGE

## 2024-01-30 RX ORDER — DAPAGLIFLOZIN 10 MG/1
1 TABLET, FILM COATED ORAL
Refills: 0 | DISCHARGE

## 2024-01-30 RX ORDER — SODIUM CHLORIDE 9 MG/ML
1000 INJECTION, SOLUTION INTRAVENOUS
Refills: 0 | Status: DISCONTINUED | OUTPATIENT
Start: 2024-01-30 | End: 2024-01-30

## 2024-01-30 RX ORDER — PROPRANOLOL HCL 160 MG
1 CAPSULE, EXTENDED RELEASE 24HR ORAL
Refills: 0 | DISCHARGE

## 2024-01-30 RX ORDER — MIRABEGRON 50 MG/1
1 TABLET, EXTENDED RELEASE ORAL
Refills: 0 | DISCHARGE

## 2024-01-30 RX ORDER — RANOLAZINE 500 MG/1
1 TABLET, FILM COATED, EXTENDED RELEASE ORAL
Refills: 0 | DISCHARGE

## 2024-01-30 RX ORDER — SITAGLIPTIN AND METFORMIN HYDROCHLORIDE 500; 50 MG/1; MG/1
1 TABLET, FILM COATED ORAL
Refills: 0 | DISCHARGE

## 2024-01-30 RX ORDER — LINACLOTIDE 145 UG/1
1 CAPSULE, GELATIN COATED ORAL
Refills: 0 | DISCHARGE

## 2024-01-30 RX ORDER — PIOGLITAZONE HYDROCHLORIDE 15 MG/1
1 TABLET ORAL
Refills: 0 | DISCHARGE

## 2024-01-30 NOTE — CHART NOTE - NSCHARTNOTEFT_GEN_A_CORE
PACU ANESTHESIA ADMISSION NOTE      Procedure: Right leg angiogram, SFA atherectomy, anterior tibial atherectomy, posterior tibial atherectomy  Post op diagnosis:      ____  Intubated  TV:______       Rate: ______      FiO2: ______    ____  Patent Airway    ____  Full return of protective reflexes    __x__  Full recovery from anesthesia / back to baseline     Vitals:   T:   97.9        R:  16                BP: 103/59                 Sat:  99                 P: 60      Mental Status:  __x__ Awake   _____ Alert   _____ Drowsy   _____ Sedated    Nausea/Vomiting:  _x___ NO  ______Yes,   See Post - Op Orders          Pain Scale (0-10):  __0___    Treatment: ____ None    ____ See Post - Op/PCA Orders    Post - Operative Fluids:   __x__ Oral   ____ See Post - Op Orders    Plan: Discharge:   ___x_Home       _____Floor     _____Critical Care    _____  Other:_________________    Comments:

## 2024-01-30 NOTE — PRE-ANESTHESIA EVALUATION ADULT - NSANTHPMHFT_GEN_ALL_CORE
Patient is a 89 year old male presenting to PAST in preparation for   RIGHT LEG ANGIOGRAM POSSIBLE ENDOVASCULAR REVASCULARIZATION on 1/30 under lsb anesthesia by Dr. Quiroz.  PAST MEDICAL HISTORY:  Diabetes     H/O ischemic heart disease     HTN (hypertension)     Hypercholesteremia     Pacemaker     Prostate disorder     PVD (peripheral vascular disease).     PAST SURGICAL HISTORY:  History of cholecystectomy     S/P CABG (coronary artery bypass graft)     S/P placement of cardiac pacemaker.

## 2024-01-30 NOTE — ASU DISCHARGE PLAN (ADULT/PEDIATRIC) - CARE PROVIDER_API CALL
Ruben Quiroz  Vascular Surgery  [address] [place] [address], [address]  [place] [address]  Phone: [phone]  Fax: [phone]  Follow Up Time: 2 weeks

## 2024-01-30 NOTE — BRIEF OPERATIVE NOTE - OPERATION/FINDINGS
Procedure:  1. US guided L CFA access 5Fr sheath  2. Third order angiogram right lower extremity  3. CSI 1.25 atherectomy R SFA and R DAISY   4. DCB angiplasty 5mm x 60mm R SFA  5. 2mm x 220 POBA R PTA  6. Celt closure L CFA    Findings: high grade stenosis R SFA at the adductor hiatus, single vessel runoff via PTA with collaterals filling the foot

## 2024-01-31 PROBLEM — I10 ESSENTIAL (PRIMARY) HYPERTENSION: Chronic | Status: ACTIVE | Noted: 2024-01-19

## 2024-01-31 PROBLEM — I73.9 PERIPHERAL VASCULAR DISEASE, UNSPECIFIED: Chronic | Status: ACTIVE | Noted: 2024-01-19

## 2024-02-01 PROBLEM — Z86.79 PERSONAL HISTORY OF OTHER DISEASES OF THE CIRCULATORY SYSTEM: Chronic | Status: ACTIVE | Noted: 2024-01-19

## 2024-02-01 PROBLEM — N42.9 DISORDER OF PROSTATE, UNSPECIFIED: Chronic | Status: ACTIVE | Noted: 2024-01-19

## 2024-02-01 PROBLEM — E11.9 TYPE 2 DIABETES MELLITUS WITHOUT COMPLICATIONS: Chronic | Status: ACTIVE | Noted: 2024-01-19

## 2024-02-01 PROBLEM — E78.00 PURE HYPERCHOLESTEROLEMIA, UNSPECIFIED: Chronic | Status: ACTIVE | Noted: 2024-01-19

## 2024-02-01 PROBLEM — Z95.0 PRESENCE OF CARDIAC PACEMAKER: Chronic | Status: ACTIVE | Noted: 2024-01-19

## 2024-02-07 ENCOUNTER — APPOINTMENT (OUTPATIENT)
Dept: VASCULAR SURGERY | Facility: CLINIC | Age: 89
End: 2024-02-07
Payer: MEDICARE

## 2024-02-07 VITALS
WEIGHT: 170 LBS | SYSTOLIC BLOOD PRESSURE: 129 MMHG | HEIGHT: 69 IN | BODY MASS INDEX: 25.18 KG/M2 | DIASTOLIC BLOOD PRESSURE: 77 MMHG

## 2024-02-07 PROCEDURE — 99214 OFFICE O/P EST MOD 30 MIN: CPT

## 2024-02-07 PROCEDURE — 93926 LOWER EXTREMITY STUDY: CPT

## 2024-02-09 NOTE — ASSESSMENT
[FreeTextEntry1] : The patient is a 89-year-old male status post right lower extremity angiogram and endovascular revascularization.  The patient still has significant tibial vessel disease and a right heel foot wound.  I would like to bring the patient back for a redo right lower extremity angiogram with tibial vessel access and attempt a retrograde puncture and revascularization.  I have discussed the risks and the benefits with the patient including the risk of thrombosis and bleeding.  The patient is in agreement.  He has a superficial cutaneous rash present with some small petechiae.  I recommend Benadryl however if his symptoms worsen he may require steroid Medrol pack.  The patient will call if his symptoms worsen.  For now he is scheduled for February 20, 2024 for a right lower extremity angiogram with retrograde tibial access.  30 minutes spent with the patient obtaining history, reviewing labs/studies ordering studies, physical exam and discussing plan.    I, Dr. Quiroz, personally performed the evaluation and management (E/M) services for this established patient who presents today with (a) new problem(s)/exacerbation of (an) existing condition(s). That E/M includes conducting the clinically appropriate interval history &/or exam, assessing all new/exacerbated conditions, and establishing a new plan of care. Today, my LYNN, Heidi], was here to observe my evaluation and management service for this new problem/exacerbated condition and follow the plan of care established by me going forward.  Thank you for allowing me to participate in the care of your patient.   Sincerely,   Ruben Quiroz MD, RPVI, FACS Associate Professor of Surgery , Vascular Fellowship Director of Limb Salvage Surgery [facility] at [facility]

## 2024-02-09 NOTE — DATA REVIEWED
[FreeTextEntry1] : Arterial duplex status post right lower extremity angioplasty.  Diffuse atherosclerotic plaque visualized throughout the femoral-popliteal tibial arteries with some flow disturbance in the distal superficial femoral artery with a velocity of 180 cm/s.  Popliteal arteries patent no evidence of diminished flow.  The tibial vessels are severely diseased with no evidence of flow seen in the anterior tibial suggesting occlusion the dorsalis pedis and peroneal arteries are not seen the posterior tibial is severely diseased with diminished flow at the distal calf.

## 2024-02-09 NOTE — REASON FOR VISIT
[Follow-Up: _____] : a [unfilled] follow-up visit [FreeTextEntry1] : Status post right leg tibial artery atherectomy and angioplasty

## 2024-02-09 NOTE — HISTORY OF PRESENT ILLNESS
[FreeTextEntry1] : The patient is a 89-year-old male status post left leg tibial artery angioplasty and atherectomy.  The patient has a nonhealing wound to his right heel.  The patient presents today complaining of a rash on his lower extremities bilaterally

## 2024-02-14 ENCOUNTER — APPOINTMENT (OUTPATIENT)
Dept: VASCULAR SURGERY | Facility: CLINIC | Age: 89
End: 2024-02-14

## 2024-02-16 NOTE — ASU PATIENT PROFILE, ADULT - VISION (WITH CORRECTIVE LENSES IF THE PATIENT USUALLY WEARS THEM):
reading glasses at home/Normal vision: sees adequately in most situations; can see medication labels, newsprint

## 2024-02-20 ENCOUNTER — APPOINTMENT (OUTPATIENT)
Dept: VASCULAR SURGERY | Facility: HOSPITAL | Age: 89
End: 2024-02-20

## 2024-02-20 ENCOUNTER — TRANSCRIPTION ENCOUNTER (OUTPATIENT)
Age: 89
End: 2024-02-20

## 2024-02-20 ENCOUNTER — OUTPATIENT (OUTPATIENT)
Dept: OUTPATIENT SERVICES | Facility: HOSPITAL | Age: 89
LOS: 1 days | Discharge: HOME | End: 2024-02-20
Payer: MEDICARE

## 2024-02-20 VITALS
OXYGEN SATURATION: 100 % | DIASTOLIC BLOOD PRESSURE: 67 MMHG | HEART RATE: 65 BPM | RESPIRATION RATE: 16 BRPM | WEIGHT: 169.76 LBS | SYSTOLIC BLOOD PRESSURE: 148 MMHG | HEIGHT: 69 IN | TEMPERATURE: 98 F

## 2024-02-20 VITALS
SYSTOLIC BLOOD PRESSURE: 158 MMHG | OXYGEN SATURATION: 98 % | DIASTOLIC BLOOD PRESSURE: 74 MMHG | RESPIRATION RATE: 14 BRPM | HEART RATE: 62 BPM

## 2024-02-20 DIAGNOSIS — Z90.49 ACQUIRED ABSENCE OF OTHER SPECIFIED PARTS OF DIGESTIVE TRACT: Chronic | ICD-10-CM

## 2024-02-20 DIAGNOSIS — Z95.1 PRESENCE OF AORTOCORONARY BYPASS GRAFT: Chronic | ICD-10-CM

## 2024-02-20 DIAGNOSIS — Z95.0 PRESENCE OF CARDIAC PACEMAKER: Chronic | ICD-10-CM

## 2024-02-20 DIAGNOSIS — I73.9 PERIPHERAL VASCULAR DISEASE, UNSPECIFIED: ICD-10-CM

## 2024-02-20 LAB
GLUCOSE BLDC GLUCOMTR-MCNC: 114 MG/DL — HIGH (ref 70–99)
GLUCOSE BLDC GLUCOMTR-MCNC: 137 MG/DL — HIGH (ref 70–99)

## 2024-02-20 PROCEDURE — 76937 US GUIDE VASCULAR ACCESS: CPT | Mod: 26

## 2024-02-20 PROCEDURE — 37224: CPT | Mod: RT

## 2024-02-20 PROCEDURE — 37229: CPT | Mod: RT

## 2024-02-20 PROCEDURE — 75710 ARTERY X-RAYS ARM/LEG: CPT | Mod: 26,XU

## 2024-02-20 PROCEDURE — C1725: CPT

## 2024-02-20 PROCEDURE — C1773: CPT

## 2024-02-20 PROCEDURE — C1769: CPT

## 2024-02-20 PROCEDURE — C1887: CPT

## 2024-02-20 PROCEDURE — 98980 RTM TX MGMT 1ST 20 MIN: CPT

## 2024-02-20 PROCEDURE — 36248 INS CATH ABD/L-EXT ART ADDL: CPT

## 2024-02-20 PROCEDURE — 71046 X-RAY EXAM CHEST 2 VIEWS: CPT

## 2024-02-20 PROCEDURE — C1724: CPT

## 2024-02-20 PROCEDURE — C1894: CPT

## 2024-02-20 PROCEDURE — C1760: CPT

## 2024-02-20 PROCEDURE — 36247 INS CATH ABD/L-EXT ART 3RD: CPT | Mod: 59,RT

## 2024-02-20 PROCEDURE — 37234: CPT | Mod: RT

## 2024-02-20 PROCEDURE — 82962 GLUCOSE BLOOD TEST: CPT

## 2024-02-20 RX ORDER — APIXABAN 2.5 MG/1
1 TABLET, FILM COATED ORAL
Qty: 60 | Refills: 0
Start: 2024-02-20 | End: 2024-03-20

## 2024-02-20 RX ORDER — ONDANSETRON 8 MG/1
4 TABLET, FILM COATED ORAL ONCE
Refills: 0 | Status: DISCONTINUED | OUTPATIENT
Start: 2024-02-20 | End: 2024-02-20

## 2024-02-20 RX ORDER — ASPIRIN/CALCIUM CARB/MAGNESIUM 324 MG
1 TABLET ORAL
Qty: 30 | Refills: 0
Start: 2024-02-20 | End: 2024-03-20

## 2024-02-20 RX ORDER — HYDROMORPHONE HYDROCHLORIDE 2 MG/ML
0.5 INJECTION INTRAMUSCULAR; INTRAVENOUS; SUBCUTANEOUS
Refills: 0 | Status: DISCONTINUED | OUTPATIENT
Start: 2024-02-20 | End: 2024-02-20

## 2024-02-20 RX ORDER — SODIUM CHLORIDE 9 MG/ML
1000 INJECTION, SOLUTION INTRAVENOUS
Refills: 0 | Status: DISCONTINUED | OUTPATIENT
Start: 2024-02-20 | End: 2024-02-20

## 2024-02-20 RX ORDER — ASPIRIN/CALCIUM CARB/MAGNESIUM 324 MG
325 TABLET ORAL ONCE
Refills: 0 | Status: COMPLETED | OUTPATIENT
Start: 2024-02-20 | End: 2024-02-20

## 2024-02-20 RX ORDER — ACETAMINOPHEN 500 MG
650 TABLET ORAL ONCE
Refills: 0 | Status: DISCONTINUED | OUTPATIENT
Start: 2024-02-20 | End: 2024-02-20

## 2024-02-20 RX ADMIN — HYDROMORPHONE HYDROCHLORIDE 0.5 MILLIGRAM(S): 2 INJECTION INTRAMUSCULAR; INTRAVENOUS; SUBCUTANEOUS at 17:41

## 2024-02-20 RX ADMIN — HYDROMORPHONE HYDROCHLORIDE 0.5 MILLIGRAM(S): 2 INJECTION INTRAMUSCULAR; INTRAVENOUS; SUBCUTANEOUS at 18:11

## 2024-02-20 RX ADMIN — Medication 325 MILLIGRAM(S): at 18:06

## 2024-02-20 NOTE — BRIEF OPERATIVE NOTE - NSICDXBRIEFPROCEDURE_GEN_ALL_CORE_FT
PROCEDURES:  Angioplasty of anterior tibial artery 20-Feb-2024 16:40:30  Vasyl Brown  Angioplasty of posterior tibial artery 20-Feb-2024 16:40:45  Vasyl Brown  Angioplasty, artery, peroneal, open or percutaneous approach 20-Feb-2024 16:41:03  Vasyl Brown

## 2024-02-20 NOTE — ASU DISCHARGE PLAN (ADULT/PEDIATRIC) - CARE PROVIDER_API CALL
Ruben Quiroz  Vascular Surgery  [address] [place] [address], [address]  [place] [address]  Phone: [phone]  Fax: [phone]  Follow Up Time:

## 2024-02-20 NOTE — CHART NOTE - NSCHARTNOTEFT_GEN_A_CORE
S/p right leg angio   AT/PT/ peroneal revascularization but severe pedal disease (desert foot) given advance age and poor conduits no further intervention   poor limb salvage candidate

## 2024-02-20 NOTE — BRIEF OPERATIVE NOTE - OPERATION/FINDINGS
Right lower extremity angiogram with right femoral artery access. Right posterior tibial arthrectomy with balloon angioplasty. Peroneal artery arthrectomy with angioplasty. Retrograde access of right anterior tibial artery with balloon angioplasty. 22 Units tPA given to tibioperoneal trunk and posterior tibial artery.    9000 units heparin  22 U tPA

## 2024-02-20 NOTE — ASU DISCHARGE PLAN (ADULT/PEDIATRIC) - ASU DC SPECIAL INSTRUCTIONSFT
You are being discharged from [facility] following your right lower extremity angiogram and angioplasty for non-healing foot ulcer and pain. Please keep the two dressing in place for 48 hrs. You may shower, but keep the dressings dry and remove them if they get wet. Please continue to take your home medications as prescribed and take tylenol 650 mg every 6 hours for pain control. You have been prescribed Eliquis (a blood thinner) 2.5 mg every 12 hours and aspirin 325 mg daily. Please take these medications until follow up with Dr. Quiroz. Please inform your primary care physician and cardiologist that you are starting these medications. Please use light activity including ambulation over the next couple of weeks. Please call the office or come to the emergency department if you experience severe headache, vision changes, slurring of words, facial drooping, numbness/tingling, new-onset weakness, coldness, skin mottling, bleeding, expanding or pulsatile mass in any of your extremities. Please call Dr. Quiroz's office to schedule an appointment for follow up in 1 week. You are being discharged from [facility] following your right lower extremity angiogram and angioplasty for non-healing foot ulcer and pain. Please keep the two dressing in place for 48 hrs. You may shower, but keep the dressings dry and remove them if they get wet. Please continue to take your home medications as prescribed and take tylenol 650 mg every 6 hours for pain control. You have been prescribed Eliquis (a blood thinner) 2.5 mg every 12 hours and aspirin 81 mg daily. Please take these medications until follow up with Dr. Quiroz. Please inform your primary care physician and cardiologist that you are starting these medications. Please use light activity including ambulation over the next couple of weeks. Please call the office or come to the emergency department if you experience severe headache, vision changes, slurring of words, facial drooping, numbness/tingling, new-onset weakness, coldness, skin mottling, bleeding, expanding or pulsatile mass in any of your extremities. Please call Dr. Quiroz's office to schedule an appointment for follow up in 1 week.

## 2024-02-20 NOTE — ASU DISCHARGE PLAN (ADULT/PEDIATRIC) - NS MD DC FALL RISK RISK
For information on Fall & Injury Prevention, visit: [email or web address] OR  [email or web address] OR  https://www.cdc.gov/steadi/patient.html

## 2024-02-20 NOTE — PRE-ANESTHESIA EVALUATION ADULT - NSANTHOSAYNRD_GEN_A_CORE
denies/No. VIVIENNE screening performed.  STOP BANG Legend: 0-2 = LOW Risk; 3-4 = INTERMEDIATE Risk; 5-8 = HIGH Risk

## 2024-02-20 NOTE — ASU PREOP CHECKLIST - HAND OFF
DISPLAY PLAN FREE TEXT
Holding RN to OR RN

## 2024-02-23 DIAGNOSIS — E78.5 HYPERLIPIDEMIA, UNSPECIFIED: ICD-10-CM

## 2024-02-23 DIAGNOSIS — Z95.1 PRESENCE OF AORTOCORONARY BYPASS GRAFT: ICD-10-CM

## 2024-02-23 DIAGNOSIS — Z79.82 LONG TERM (CURRENT) USE OF ASPIRIN: ICD-10-CM

## 2024-02-23 DIAGNOSIS — I70.291 OTHER ATHEROSCLEROSIS OF NATIVE ARTERIES OF EXTREMITIES, RIGHT LEG: ICD-10-CM

## 2024-02-23 DIAGNOSIS — Z95.0 PRESENCE OF CARDIAC PACEMAKER: ICD-10-CM

## 2024-02-23 DIAGNOSIS — I10 ESSENTIAL (PRIMARY) HYPERTENSION: ICD-10-CM

## 2024-02-23 DIAGNOSIS — E11.51 TYPE 2 DIABETES MELLITUS WITH DIABETIC PERIPHERAL ANGIOPATHY WITHOUT GANGRENE: ICD-10-CM

## 2024-02-23 DIAGNOSIS — Z79.84 LONG TERM (CURRENT) USE OF ORAL HYPOGLYCEMIC DRUGS: ICD-10-CM

## 2024-02-23 DIAGNOSIS — N42.9 DISORDER OF PROSTATE, UNSPECIFIED: ICD-10-CM

## 2024-03-06 ENCOUNTER — APPOINTMENT (OUTPATIENT)
Dept: VASCULAR SURGERY | Facility: CLINIC | Age: 89
End: 2024-03-06
Payer: MEDICARE

## 2024-03-06 VITALS — BODY MASS INDEX: 25.18 KG/M2 | HEIGHT: 69 IN | WEIGHT: 170 LBS

## 2024-03-06 PROCEDURE — 99213 OFFICE O/P EST LOW 20 MIN: CPT

## 2024-03-06 RX ORDER — APIXABAN 2.5 MG/1
2.5 TABLET, FILM COATED ORAL TWICE DAILY
Qty: 60 | Refills: 2 | Status: ACTIVE | COMMUNITY
Start: 2024-03-06 | End: 1900-01-01

## 2024-03-06 NOTE — ASSESSMENT
[FreeTextEntry1] : The patient is a 89-year-old male status post left tibial artery angioplasty and atherectomy.  The patient had a nonhealing wound to his right heel.  The patient underwent a right lower extremity angiogram and angioplasty of some small tributary vessels.  The patient has no direct flow into the foot.  He is foot is supplied by collaterals.  His right foot is warm to the touch and he denies rest pain at this time.  The patient's right heel wound is healing as per podiatry.  The patient states he develops pain in his heel when he walks at the level of 5-6 however once he rests the pain subsides. I recommend he continue with local wound care and follow-up in 3 months time or sooner if his symptoms worsen.  Patient has collateral circulation and his wound is improving.  I spent a total of 25 minutes examining the patient discussing the test results and providing a treatment plan.   I, Dr. Quiroz, personally performed the evaluation and management (E/M) services for this established patient who presents today with (a) new problem(s)/exacerbation of (an) existing condition(s). That E/M includes conducting the clinically appropriate interval history &/or exam, assessing all new/exacerbated conditions, and establishing a new plan of care. Today, my LYNN, Gianna Karimi was here to observe my evaluation and management service for this new problem/exacerbated condition and follow the plan of care established by me going forward.  Thank you for allowing me to participate in the care of your patient.   Sincerely,   Ruben Quiroz MD, RPVI, FACS Associate Professor of Surgery , Vascular Fellowship Director of Limb Salvage Surgery [facility] at [facility]

## 2024-03-06 NOTE — HISTORY OF PRESENT ILLNESS
[FreeTextEntry1] : The patient is a 89-year-old male status post left tibial artery angioplasty and atherectomy.  The patient had a nonhealing wound to his right heel.  The patient underwent a right lower extremity angiogram and angioplasty of some small tributary vessels.  The patient has no direct flow into the foot.  He is foot is supplied by collaterals.  His right foot is warm to the touch and he denies rest pain at this time.  The patient's right heel wound is healing as per podiatry.  The patient states he develops pain in his heel when he walks at the level of 5-6 however once he rests the pain subsides.

## 2024-03-06 NOTE — REASON FOR VISIT
[Follow-Up: _____] : a [unfilled] follow-up visit [FreeTextEntry1] : s/p left tibial artery angioplasty and a left heal wound.

## 2024-03-13 ENCOUNTER — APPOINTMENT (OUTPATIENT)
Dept: CARDIOLOGY | Facility: CLINIC | Age: 89
End: 2024-03-13
Payer: MEDICARE

## 2024-03-13 ENCOUNTER — NON-APPOINTMENT (OUTPATIENT)
Age: 89
End: 2024-03-13

## 2024-03-13 PROCEDURE — 93296 REM INTERROG EVL PM/IDS: CPT

## 2024-03-13 PROCEDURE — 93294 REM INTERROG EVL PM/LDLS PM: CPT

## 2024-03-21 ENCOUNTER — APPOINTMENT (OUTPATIENT)
Dept: CARDIOLOGY | Facility: CLINIC | Age: 89
End: 2024-03-21
Payer: MEDICARE

## 2024-03-21 VITALS
HEART RATE: 60 BPM | SYSTOLIC BLOOD PRESSURE: 114 MMHG | HEIGHT: 69 IN | DIASTOLIC BLOOD PRESSURE: 58 MMHG | WEIGHT: 161 LBS | BODY MASS INDEX: 23.85 KG/M2

## 2024-03-21 PROCEDURE — 99214 OFFICE O/P EST MOD 30 MIN: CPT

## 2024-03-21 PROCEDURE — 93000 ELECTROCARDIOGRAM COMPLETE: CPT

## 2024-03-21 NOTE — HISTORY OF PRESENT ILLNESS
[FreeTextEntry1] : 89 year old male with PMH of  CAD, s/p CABG at [facility] in 2011. Progressive AS, s/p TAVR at [facility] in May 2019, followed by PPM placement.  H/o DM, HTN, hyperlipidemia. Hx of getting  up quickly after getting a phone call, felt lightheaded, tripped a fell, broke 6 ribs.  Healed no more pain.  Pt is active, walks QD 2 miles  with R/w without Angina.  Pt c.o occasional needle  like pain on the L side of the chest mostly after meals that resolves on its own.  Pt presents for a follow up visit. Still has occasional chest discomfort not related to exertion otherwise reports feeling well.   LE PAD. S/p left tibial artery angioplasty and atherectomy. Slowly healing right heel ulcer, occluded infrapoplitieal vessels.  LDL 50 GFR 50 TSH 3.76.

## 2024-03-21 NOTE — CARDIOLOGY SUMMARY
[de-identified] : 11/09/23: Atrial pacing 6/min, 1st degree AVB, LBBB [de-identified] : 06/15/21:\par  LVEF 64%, G2DD\par  S/p TAVR, normal function, PG 23/16\par  Mild MR, TR.

## 2024-03-21 NOTE — ASSESSMENT
[FreeTextEntry1] : 89 Y/o CAD, s/p CABG, patent grafts prior to TAVR. HTN controlled now with episodes of dizziness, low BP. S/p TAVR. Diastolic dysfunction, appears euvolemic. Hyperlipidemia. Severe right LE PAD, slowly healing right heel ulcer.  Plan: Continue treatment. D/c HCTZ. Avoid sudden standing. Repeat fasting blood work. Patient is seeking second opinion about his revascularization options for the RLE. F/u in 3 months.  Eleazar Nguyễn MD

## 2024-03-21 NOTE — PHYSICAL EXAM
[Well Developed] : well developed [No Acute Distress] : no acute distress [Well Nourished] : well nourished [Normal Conjunctiva] : normal conjunctiva [Normal Venous Pressure] : normal venous pressure [No Carotid Bruit] : no carotid bruit [Rhythm Regular] : regular [Normal Rate] : normal [Normal S2] : normal S2 [Normal S1] : normal S1 [S4] : an S4 was heard [III] : a grade 3 [No Pitting Edema] : no pitting edema present [Crescendo-Decrescendo] : crescendo-decrescendo [Clear Lung Fields] : clear lung fields [No Respiratory Distress] : no respiratory distress  [Soft] : abdomen soft [Good Air Entry] : good air entry [Non Tender] : non-tender [Normal Bowel Sounds] : normal bowel sounds [Normal Gait] : normal gait [No Edema] : no edema [No Clubbing] : no clubbing [No Cyanosis] : no cyanosis [No Varicosities] : no varicosities [No Rash] : no rash [Moves all extremities] : moves all extremities [Normal Speech] : normal speech [No Focal Deficits] : no focal deficits [Alert and Oriented] : alert and oriented [Normal memory] : normal memory [S3] : no S3 [Diminished Pedal Pulses ___] : diminished pedal pulses [unfilled]

## 2024-03-25 ENCOUNTER — APPOINTMENT (OUTPATIENT)
Dept: CARDIOLOGY | Facility: CLINIC | Age: 89
End: 2024-03-25
Payer: MEDICARE

## 2024-03-25 VITALS
DIASTOLIC BLOOD PRESSURE: 80 MMHG | HEIGHT: 69 IN | SYSTOLIC BLOOD PRESSURE: 130 MMHG | WEIGHT: 161 LBS | BODY MASS INDEX: 23.85 KG/M2

## 2024-03-25 DIAGNOSIS — R00.1 BRADYCARDIA, UNSPECIFIED: ICD-10-CM

## 2024-03-25 DIAGNOSIS — E11.59 TYPE 2 DIABETES MELLITUS WITH OTHER CIRCULATORY COMPLICATIONS: ICD-10-CM

## 2024-03-25 DIAGNOSIS — I73.9 PERIPHERAL VASCULAR DISEASE, UNSPECIFIED: ICD-10-CM

## 2024-03-25 PROCEDURE — 99204 OFFICE O/P NEW MOD 45 MIN: CPT

## 2024-03-25 NOTE — REVIEW OF SYSTEMS
[Headache] : headache [Feeling Fatigued] : feeling fatigued [Lower Ext Edema] : no extremity edema [Leg Claudication] : no intermittent leg claudication [Palpitations] : no palpitations [Syncope] : no syncope [Rash] : no rash [Dizziness] : dizziness [Tremor] : a tremor was seen [Numbness (Hypoesthesia)] : no numbness [Anxiety] : no anxiety [Speech Disturbance] : no speech disturbance [Easy Bleeding] : no tendency for easy bleeding [Easy Bruising] : no tendency for easy bruising [Negative] : Gastrointestinal

## 2024-03-25 NOTE — ASSESSMENT
[FreeTextEntry1] : 89 Y/o CAD, s/p CABG, patent grafts prior to TAVR. HTN controlled now with episodes of dizziness, low BP. S/p TAVR. Severe right LE PAD, slowly healing right heel ulcer. second opinion re APD  family reports improvment in healing and pain since last procedure   Plan: - Continue aggressive wound care  Hyperbaric? - If healing stops consider LimFlow eval - D/w Dr Quiroz - Continue risk factor mod.

## 2024-03-25 NOTE — HISTORY OF PRESENT ILLNESS
[FreeTextEntry1] : 89 year old male with PMH of  CAD, s/p CABG at [facility] in 2011. Progressive AS, s/p TAVR at [facility] in May 2019, followed by PPM placement. H/o DM, HTN, hyperlipidemia. presents today for second opinion re left heel non healing ulcer  Prior procedures with Dr Quiroz   ==== Date reviewed today ====  LE angiogram  LDL 50 GFR 50 TSH 3.76.

## 2024-03-25 NOTE — PHYSICAL EXAM
[Well Developed] : well developed [Well Nourished] : well nourished [No Acute Distress] : no acute distress [Normal Conjunctiva] : normal conjunctiva [Normal Venous Pressure] : normal venous pressure [Normal Rate] : normal [No Carotid Bruit] : no carotid bruit [Rhythm Regular] : regular [Normal S1] : normal S1 [Normal S2] : normal S2 [S4] : an S4 was heard [S3] : no S3 [III] : a grade 3 [No Pitting Edema] : no pitting edema present [Crescendo-Decrescendo] : crescendo-decrescendo [Clear Lung Fields] : clear lung fields [Good Air Entry] : good air entry [No Respiratory Distress] : no respiratory distress  [Soft] : abdomen soft [Non Tender] : non-tender [Normal Bowel Sounds] : normal bowel sounds [Normal Gait] : normal gait [No Varicosities] : no varicosities [Diminished Pedal Pulses ___] : diminished pedal pulses [unfilled] [No Rash] : no rash [Moves all extremities] : moves all extremities [No Focal Deficits] : no focal deficits [Normal Speech] : normal speech [Alert and Oriented] : alert and oriented [Normal memory] : normal memory

## 2024-03-25 NOTE — CARDIOLOGY SUMMARY
[de-identified] : 06/15/21:\par  LVEF 64%, G2DD\par  S/p TAVR, normal function, PG 23/16\par  Mild MR, TR. [de-identified] : 11/09/23: Atrial pacing 6/min, 1st degree AVB, LBBB

## 2024-04-17 ENCOUNTER — APPOINTMENT (OUTPATIENT)
Dept: VASCULAR SURGERY | Facility: CLINIC | Age: 89
End: 2024-04-17
Payer: MEDICARE

## 2024-04-17 VITALS
BODY MASS INDEX: 23.7 KG/M2 | HEIGHT: 69 IN | DIASTOLIC BLOOD PRESSURE: 64 MMHG | WEIGHT: 160 LBS | SYSTOLIC BLOOD PRESSURE: 146 MMHG

## 2024-04-17 DIAGNOSIS — I25.119 ATHEROSCLEROTIC HEART DISEASE OF NATIVE CORONARY ARTERY WITH UNSPECIFIED ANGINA PECTORIS: ICD-10-CM

## 2024-04-17 PROCEDURE — 99213 OFFICE O/P EST LOW 20 MIN: CPT

## 2024-04-17 NOTE — HISTORY OF PRESENT ILLNESS
[FreeTextEntry1] : The patient is a 89-year-old male status post left tibial artery angioplasty and atherectomy.  The patient had a nonhealing wound to his right heel.  The patient underwent a right lower extremity angiogram and angioplasty of some small tributary vessels.  The patient has no direct flow into the foot.  His foot is supplied by collaterals.  His right foot is warm to the touch, and he denies rest pain at this time.  The patient's right heel wound is healing as per podiatry.  The patient states he develops pain in his heel when he walks at the level of 5-6 however once he rests the pain subsides.  Today he presents for follow-up of his right heel ulcer and a new third digit wound.

## 2024-04-17 NOTE — ASSESSMENT
[FreeTextEntry1] : The patient is a 89-year-old male status post left tibial artery angioplasty and atherectomy.  The patient had a nonhealing wound to his right heel.  The patient underwent a right lower extremity angiogram and angioplasty of some small tributary vessels.  The patient has no direct flow into the foot.  He is foot is supplied by collaterals.  His right foot is warm to the touch and he denies rest pain at this time.  The patient's right heel wound is healing as per podiatry.  The patient states he develops pain in his heel when he walks at the level of 5-6 however once he rests the pain subsides. I recommend he continue with local wound care I would like to see the patient back in my office in 2 weeks time if his right foot wound worsens, he may benefit from a angiogram with possible DVA  I spent a total of 25 minutes examining the patient discussing the test results and providing a treatment plan.   I, Dr. Quiroz, personally performed the evaluation and management (E/M) services for this established patient who presents today with (a) new problem(s)/exacerbation of (an) existing condition(s). That E/M includes conducting the clinically appropriate interval history &/or exam, assessing all new/exacerbated conditions, and establishing a new plan of care. Today, my LYNN, Gianna Karimi was here to observe my evaluation and management service for this new problem/exacerbated condition and follow the plan of care established by me going forward.  Thank you for allowing me to participate in the care of your patient.   Sincerely,   Ruben Quiroz MD, RPVI, FACS Associate Professor of Surgery , Vascular Fellowship Director of Limb Salvage Surgery [facility] at [facility]

## 2024-05-08 ENCOUNTER — APPOINTMENT (OUTPATIENT)
Dept: VASCULAR SURGERY | Facility: CLINIC | Age: 89
End: 2024-05-08
Payer: MEDICARE

## 2024-05-08 VITALS — BODY MASS INDEX: 23.85 KG/M2 | HEIGHT: 69 IN | WEIGHT: 161 LBS

## 2024-05-08 VITALS — HEART RATE: 76 BPM | DIASTOLIC BLOOD PRESSURE: 67 MMHG | SYSTOLIC BLOOD PRESSURE: 148 MMHG

## 2024-05-08 PROCEDURE — 99213 OFFICE O/P EST LOW 20 MIN: CPT

## 2024-05-08 PROCEDURE — 93926 LOWER EXTREMITY STUDY: CPT

## 2024-06-12 ENCOUNTER — APPOINTMENT (OUTPATIENT)
Dept: CARDIOLOGY | Facility: CLINIC | Age: 89
End: 2024-06-12
Payer: MEDICARE

## 2024-06-12 ENCOUNTER — NON-APPOINTMENT (OUTPATIENT)
Age: 89
End: 2024-06-12

## 2024-06-12 PROCEDURE — 93294 REM INTERROG EVL PM/LDLS PM: CPT

## 2024-06-12 PROCEDURE — 93296 REM INTERROG EVL PM/IDS: CPT

## 2024-06-18 NOTE — DATA REVIEWED
[FreeTextEntry1] : Arterial duplex history of tibial angioplasty.  There is 1 vessel patent at the foot level posterior tibial the patent artery with evidence of biphasic flow.  Diffuse atherosclerotic plaque visualized throughout the femoral-popliteal and tibial arteries.  There is flow disturbance noted in the distal segment of the superficial femoral artery with velocity of 230 cm/s.  The popliteal artery is patent

## 2024-06-18 NOTE — ASSESSMENT
[FreeTextEntry1] : The patient is a 89-year-old male status post left tibial artery angioplasty and atherectomy.  The patient had a nonhealing wound to his right heel.  The patient underwent a right lower extremity angiogram and angioplasty of some small tributary vessels.  The patient has no direct flow into the foot.  He is foot is supplied by collaterals.  His right foot is warm to the touch and he denies rest pain at this time.  The patient's right heel wound is healing as per podiatry.  . I recommend he continue with local wound care I would like to see the patient back in my office in 3 months time if his right foot wound worsens, he may benefit from a angiogram with possible DVA  I spent a total of 25 minutes examining the patient discussing the test results and providing a treatment plan.   I, Dr. Quiroz, personally performed the evaluation and management (E/M) services for this established patient who presents today with (a) new problem(s)/exacerbation of (an) existing condition(s). That E/M includes conducting the clinically appropriate interval history &/or exam, assessing all new/exacerbated conditions, and establishing a new plan of care. Today, my LYNN, Gianna Trev was here to observe my evaluation and management service for this new problem/exacerbated condition and follow the plan of care established by me going forward.  Thank you for allowing me to participate in the care of your patient.   Sincerely,   Ruben Quiroz MD, RPVI, FACS Associate Professor of Surgery , Vascular Fellowship Director of Limb Salvage Surgery [facility] at hospitals/[facility]

## 2024-06-19 ENCOUNTER — APPOINTMENT (OUTPATIENT)
Dept: VASCULAR SURGERY | Facility: CLINIC | Age: 89
End: 2024-06-19
Payer: MEDICARE

## 2024-06-19 VITALS — DIASTOLIC BLOOD PRESSURE: 64 MMHG | SYSTOLIC BLOOD PRESSURE: 147 MMHG | HEART RATE: 65 BPM

## 2024-06-19 VITALS — WEIGHT: 161 LBS | HEIGHT: 69 IN | BODY MASS INDEX: 23.85 KG/M2

## 2024-06-19 PROCEDURE — 93922 UPR/L XTREMITY ART 2 LEVELS: CPT

## 2024-06-19 PROCEDURE — 99213 OFFICE O/P EST LOW 20 MIN: CPT

## 2024-06-19 NOTE — ASSESSMENT
[FreeTextEntry1] : The patient is a 89-year-old male status post left tibial artery angioplasty and atherectomy.  The patient had a nonhealing wound to his right heel.  The patient underwent a right lower extremity angiogram and angioplasty of some small tributary vessels.  The patient has no direct flow into the foot.  He is foot is supplied by collaterals.  His right foot is warm to the touch and he denies rest pain at this time.  The patient's right heel wound is healing as per podiatry.  . I recommend he continue with local wound care I would like to see the patient back in my office in 3 months time if his right foot wound worsens, he may benefit from a angiogram with possible DVA  I spent a total of 25 minutes examining the patient discussing the test results and providing a treatment plan.   I, Dr. Quiroz, personally performed the evaluation and management (E/M) services for this established patient who presents today with (a) new problem(s)/exacerbation of (an) existing condition(s). That E/M includes conducting the clinically appropriate interval history &/or exam, assessing all new/exacerbated conditions, and establishing a new plan of care. Today, my LYNN, Gianna Trev was here to observe my evaluation and management service for this new problem/exacerbated condition and follow the plan of care established by me going forward.  Thank you for allowing me to participate in the care of your patient.   Sincerely,   Ruben Quiroz MD, RPVI, FACS Associate Professor of Surgery , Vascular Fellowship Director of Limb Salvage Surgery [facility] at [facility]

## 2024-06-19 NOTE — REASON FOR VISIT
[Follow-Up: _____] : a [unfilled] follow-up visit [FreeTextEntry1] : s/p left tibial artery angioplasty and a left heal wound. 1/30/24  and 2/20/24

## 2024-06-19 NOTE — HISTORY OF PRESENT ILLNESS
[FreeTextEntry1] : The patient is a 89-year-old male status post left tibial artery angioplasty and atherectomy.  The patient had a nonhealing wound to his right heel.  The patient underwent a right lower extremity angiogram and angioplasty of some small tributary vessels.  The patient has no direct flow into the foot.  His foot is supplied by collaterals.  His right foot is warm to the touch, and he denies rest pain at this time.  The patient's right heel wound is healing as per podiatry.  The patient states he develops pain in his heel when he walks at the level of 5-6 however once he rests the pain subsides.  Today he presents for follow-up of his right heel ulcer and a  1st digit wound and a new 3rd toe ulcer

## 2024-06-19 NOTE — PHYSICAL EXAM
[2+] : right 2+ [0] : right 0 [FreeTextEntry1] : Right heel ulcer slowly healing small 1 cm in size 1st toe wound and 3 rd toe wound

## 2024-06-20 ENCOUNTER — APPOINTMENT (OUTPATIENT)
Dept: CARDIOLOGY | Facility: CLINIC | Age: 89
End: 2024-06-20
Payer: MEDICARE

## 2024-06-20 VITALS
HEART RATE: 61 BPM | WEIGHT: 167 LBS | BODY MASS INDEX: 24.73 KG/M2 | HEIGHT: 69 IN | DIASTOLIC BLOOD PRESSURE: 60 MMHG | SYSTOLIC BLOOD PRESSURE: 120 MMHG

## 2024-06-20 DIAGNOSIS — R07.9 CHEST PAIN, UNSPECIFIED: ICD-10-CM

## 2024-06-20 DIAGNOSIS — I10 ESSENTIAL (PRIMARY) HYPERTENSION: ICD-10-CM

## 2024-06-20 DIAGNOSIS — I70.235 ATHEROSCLEROSIS OF NATIVE ARTERIES OF RIGHT LEG WITH ULCERATION OF OTHER PART OF FOOT: ICD-10-CM

## 2024-06-20 DIAGNOSIS — I25.9 CHRONIC ISCHEMIC HEART DISEASE, UNSPECIFIED: ICD-10-CM

## 2024-06-20 DIAGNOSIS — Z95.2 PRESENCE OF PROSTHETIC HEART VALVE: ICD-10-CM

## 2024-06-20 DIAGNOSIS — E78.49 OTHER HYPERLIPIDEMIA: ICD-10-CM

## 2024-06-20 PROCEDURE — 93000 ELECTROCARDIOGRAM COMPLETE: CPT

## 2024-06-20 PROCEDURE — 99214 OFFICE O/P EST MOD 30 MIN: CPT

## 2024-06-20 RX ORDER — APIXABAN 5 MG/1
5 TABLET, FILM COATED ORAL
Qty: 60 | Refills: 3 | Status: DISCONTINUED | COMMUNITY
Start: 2024-06-19 | End: 2024-06-20

## 2024-06-20 RX ORDER — OMEGA-3/DHA/EPA/FISH OIL 300-1000MG
400 CAPSULE ORAL
Refills: 0 | Status: ACTIVE | COMMUNITY

## 2024-06-20 RX ORDER — SERTRALINE 25 MG/1
25 TABLET, FILM COATED ORAL DAILY
Refills: 0 | Status: ACTIVE | COMMUNITY

## 2024-06-20 RX ORDER — IPRATROPIUM BROMIDE 42 UG/1
0.06 SPRAY NASAL 3 TIMES DAILY
Refills: 0 | Status: DISCONTINUED | COMMUNITY

## 2024-06-20 RX ORDER — PIOGLITAZONE HYDROCHLORIDE 30 MG/1
30 TABLET ORAL DAILY
Refills: 0 | Status: ACTIVE | COMMUNITY

## 2024-06-20 RX ORDER — LINACLOTIDE 290 UG/1
290 CAPSULE, GELATIN COATED ORAL
Refills: 0 | Status: ACTIVE | COMMUNITY

## 2024-06-20 RX ORDER — BISACODYL 5 MG/1
5 TABLET, DELAYED RELEASE ORAL
Refills: 0 | Status: DISCONTINUED | COMMUNITY

## 2024-06-20 RX ORDER — CHLORHEXIDINE GLUCONATE 4 %
5 LIQUID (ML) TOPICAL
Refills: 0 | Status: ACTIVE | COMMUNITY

## 2024-06-20 RX ORDER — MIRABEGRON 25 MG/1
25 TABLET, FILM COATED, EXTENDED RELEASE ORAL
Refills: 0 | Status: ACTIVE | COMMUNITY

## 2024-06-20 RX ORDER — SITAGLIPTIN AND METFORMIN HYDROCHLORIDE 50; 1000 MG/1; MG/1
50-1000 TABLET, FILM COATED ORAL TWICE DAILY
Refills: 0 | Status: ACTIVE | COMMUNITY
Start: 2022-07-18

## 2024-06-20 NOTE — HISTORY OF PRESENT ILLNESS
[FreeTextEntry1] : 89 year old male with PMH of CAD, s/p CABG at [facility] in 2011. Progressive AS, s/p TAVR at [facility] in May 2019, followed by PPM placement.  H/o DM, HTN, hyperlipidemia. Hx of getting  up quickly after getting a phone call, felt lightheaded, tripped a fell, broke 6 ribs.  Healed no more pain.  Pt is active, walks QD 2 miles  with R/w without Angina.  Pt c.o occasional needle  like pain on the L side of the chest mostly after meals that resolves on its own.  Pt presents for a follow up visit. Still has occasional chest discomfort not related to exertion otherwise reports feeling well.   LE PAD. S/p left tibial artery angioplasty and atherectomy. Slowly healing right heel ulcer, occluded infrapoplitieal vessels.   LDL 50 GFR 50 TSH 3.76.

## 2024-06-20 NOTE — PHYSICAL EXAM
[Well Developed] : well developed [Well Nourished] : well nourished [No Acute Distress] : no acute distress [Normal Conjunctiva] : normal conjunctiva [Normal Venous Pressure] : normal venous pressure [No Carotid Bruit] : no carotid bruit [Normal Rate] : normal [Rhythm Regular] : regular [Normal S1] : normal S1 [Normal S2] : normal S2 [S4] : an S4 was heard [III] : a grade 3 [Crescendo-Decrescendo] : crescendo-decrescendo [No Pitting Edema] : no pitting edema present [Clear Lung Fields] : clear lung fields [Good Air Entry] : good air entry [No Respiratory Distress] : no respiratory distress  [Soft] : abdomen soft [Non Tender] : non-tender [Normal Bowel Sounds] : normal bowel sounds [Normal Gait] : normal gait [No Edema] : no edema [No Cyanosis] : no cyanosis [No Clubbing] : no clubbing [No Varicosities] : no varicosities [Diminished Pedal Pulses ___] : diminished pedal pulses [unfilled] [No Rash] : no rash [Moves all extremities] : moves all extremities [No Focal Deficits] : no focal deficits [Normal Speech] : normal speech [Alert and Oriented] : alert and oriented [Normal memory] : normal memory [S3] : no S3

## 2024-06-20 NOTE — ASSESSMENT
[FreeTextEntry1] : 89 Y/o CAD, s/p CABG, patent grafts prior to TAVR. HTN controlled now with episodes of dizziness, low BP. S/p TAVR. Diastolic dysfunction, appears euvolemic. Hyperlipidemia. Severe right LE PAD, slowly healing foot ulcers.  Plan: Continue treatment. Avoid sudden standing. Repeat fasting blood work. Vascular f/u/ F/u in 4 months.  Eleazar Nguyễn MD

## 2024-06-20 NOTE — CARDIOLOGY SUMMARY
[de-identified] : 6/20/24: Atrial pacing 6/0min, 1st degree AVB, LBBB [de-identified] : 06/15/21:\par  LVEF 64%, G2DD\par  S/p TAVR, normal function, PG 23/16\par  Mild MR, TR.

## 2024-07-23 RX ORDER — APIXABAN 2.5 MG/1
2.5 TABLET, FILM COATED ORAL TWICE DAILY
Qty: 60 | Refills: 6 | Status: ACTIVE | COMMUNITY
Start: 2024-07-23 | End: 1900-01-01

## 2024-08-21 ENCOUNTER — APPOINTMENT (OUTPATIENT)
Dept: VASCULAR SURGERY | Facility: CLINIC | Age: 89
End: 2024-08-21

## 2024-08-22 ENCOUNTER — APPOINTMENT (OUTPATIENT)
Dept: ELECTROPHYSIOLOGY | Facility: CLINIC | Age: 89
End: 2024-08-22

## 2024-09-24 ENCOUNTER — APPOINTMENT (OUTPATIENT)
Dept: BURN CARE | Facility: CLINIC | Age: 89
End: 2024-09-24
Payer: MEDICARE

## 2024-09-24 ENCOUNTER — OUTPATIENT (OUTPATIENT)
Dept: OUTPATIENT SERVICES | Facility: HOSPITAL | Age: 89
LOS: 1 days | End: 2024-09-24
Payer: MEDICARE

## 2024-09-24 VITALS
BODY MASS INDEX: 24.42 KG/M2 | OXYGEN SATURATION: 97 % | SYSTOLIC BLOOD PRESSURE: 135 MMHG | WEIGHT: 165.34 LBS | DIASTOLIC BLOOD PRESSURE: 75 MMHG | HEART RATE: 70 BPM

## 2024-09-24 DIAGNOSIS — Z00.8 ENCOUNTER FOR OTHER GENERAL EXAMINATION: ICD-10-CM

## 2024-09-24 DIAGNOSIS — Z95.0 PRESENCE OF CARDIAC PACEMAKER: Chronic | ICD-10-CM

## 2024-09-24 DIAGNOSIS — Z90.49 ACQUIRED ABSENCE OF OTHER SPECIFIED PARTS OF DIGESTIVE TRACT: Chronic | ICD-10-CM

## 2024-09-24 DIAGNOSIS — Z95.1 PRESENCE OF AORTOCORONARY BYPASS GRAFT: Chronic | ICD-10-CM

## 2024-09-24 DIAGNOSIS — T14.8XXA OTHER INJURY OF UNSPECIFIED BODY REGION, INITIAL ENCOUNTER: ICD-10-CM

## 2024-09-24 PROCEDURE — 99203 OFFICE O/P NEW LOW 30 MIN: CPT

## 2024-09-24 RX ORDER — MUPIROCIN 20 MG/G
2 OINTMENT TOPICAL TWICE DAILY
Qty: 1 | Refills: 2 | Status: ACTIVE | COMMUNITY
Start: 2024-09-24 | End: 1900-01-01

## 2024-09-24 RX ORDER — CHLORHEXIDINE GLUCONATE 213 G/1000ML
4 SOLUTION TOPICAL
Qty: 2 | Refills: 0 | Status: ACTIVE | COMMUNITY
Start: 2024-09-24 | End: 1900-01-01

## 2024-09-25 ENCOUNTER — APPOINTMENT (OUTPATIENT)
Dept: VASCULAR SURGERY | Facility: CLINIC | Age: 89
End: 2024-09-25
Payer: MEDICARE

## 2024-09-25 VITALS — BODY MASS INDEX: 24.44 KG/M2 | HEIGHT: 69 IN | WEIGHT: 165 LBS

## 2024-09-25 VITALS — SYSTOLIC BLOOD PRESSURE: 136 MMHG | DIASTOLIC BLOOD PRESSURE: 67 MMHG | HEART RATE: 62 BPM

## 2024-09-25 DIAGNOSIS — S91.101A UNSPECIFIED OPEN WOUND OF RIGHT GREAT TOE WITHOUT DAMAGE TO NAIL, INITIAL ENCOUNTER: ICD-10-CM

## 2024-09-25 DIAGNOSIS — I70.235 ATHEROSCLEROSIS OF NATIVE ARTERIES OF RIGHT LEG WITH ULCERATION OF OTHER PART OF FOOT: ICD-10-CM

## 2024-09-25 DIAGNOSIS — S91.301A UNSPECIFIED OPEN WOUND, RIGHT FOOT, INITIAL ENCOUNTER: ICD-10-CM
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
## 2024-09-25 PROCEDURE — 99214 OFFICE O/P EST MOD 30 MIN: CPT

## 2024-09-25 NOTE — HISTORY OF PRESENT ILLNESS
[FreeTextEntry1] : 89 y/o M with h/o DM, PVD with right foot wounds and underwent right popliteal and tibial artery angioplasty in February 2024. He presents for wound check, reports that heel ulcer is almost healed but was seen by Dr. Martinez yesterday, right first toe wound is deeper with bone exposed.

## 2024-09-25 NOTE — ASSESSMENT
[FreeTextEntry1] : 89 y/o M with h/o DM, PVD with right foot wounds and underwent right popliteal and tibial artery angioplasty in February 2024. He presents for wound check, reports that heel ulcer is almost healed but was seen by Dr. Martinez yesterday, right first toe wound is deeper with bone exposed, culture was taken.  Angiogram images reviewed - severe tibial vessel disease. Arterial duplex from May 2024 also reviewed.  I offered him a RLE angiogram as the wound has worsened and he is in agreement to proceed. Patient understands the risks of recurrence, bleeding, infection, rethrombosis, nerve injury, wound complications, injury to the catheterized vessel and need for additional procedures.  He can continue on Eliquis and Aspirin.  Continue wound care with mupirocin daily.  I spent 35 minutes with patient performing physical exam, reviewing duplex results, discussing treatment plan and follow up.   I, Dr. Quiroz, personally performed the evaluation and management (E/M) services for this established patient who presents today with (a) new problem(s)/exacerbation of (an) existing condition(s). That E/M includes conducting the clinically appropriate interval history &/or exam, assessing all new/exacerbated conditions, and establishing a new plan of care. Today, my LYNN, Heidi], was here to observe my evaluation and management service for this new problem/exacerbated condition and follow the plan of care established by me going forward.  Thank you for allowing me to participate in the care of your patient.   Sincerely,   Ruben Quiroz MD, RPVI, FACS Associate Professor of Surgery , Vascular Fellowship Director of Limb Salvage Surgery [facility] at [facility]

## 2024-09-25 NOTE — PHYSICAL EXAM
[FreeTextEntry1] : Almost healed right heel wound, right 1st toe ulcer at the distal tip about 2 cm with edema and erythema

## 2024-10-03 LAB — CULTURE, WOUND AEROBE ANAEROBE: ABNORMAL

## 2024-10-08 ENCOUNTER — APPOINTMENT (OUTPATIENT)
Dept: BURN CARE | Facility: CLINIC | Age: 89
End: 2024-10-08
Payer: MEDICARE

## 2024-10-08 ENCOUNTER — LABORATORY RESULT (OUTPATIENT)
Age: 89
End: 2024-10-08

## 2024-10-08 ENCOUNTER — OUTPATIENT (OUTPATIENT)
Dept: OUTPATIENT SERVICES | Facility: HOSPITAL | Age: 89
LOS: 1 days | Discharge: HOME | End: 2024-10-08
Payer: MEDICARE

## 2024-10-08 VITALS
SYSTOLIC BLOOD PRESSURE: 147 MMHG | HEART RATE: 68 BPM | WEIGHT: 165.34 LBS | DIASTOLIC BLOOD PRESSURE: 81 MMHG | OXYGEN SATURATION: 98 % | BODY MASS INDEX: 24.42 KG/M2

## 2024-10-08 DIAGNOSIS — Z00.8 ENCOUNTER FOR OTHER GENERAL EXAMINATION: ICD-10-CM

## 2024-10-08 DIAGNOSIS — Z95.1 PRESENCE OF AORTOCORONARY BYPASS GRAFT: Chronic | ICD-10-CM

## 2024-10-08 DIAGNOSIS — Z90.49 ACQUIRED ABSENCE OF OTHER SPECIFIED PARTS OF DIGESTIVE TRACT: Chronic | ICD-10-CM

## 2024-10-08 DIAGNOSIS — S91.301A UNSPECIFIED OPEN WOUND, RIGHT FOOT, INITIAL ENCOUNTER: ICD-10-CM

## 2024-10-08 DIAGNOSIS — Z95.0 PRESENCE OF CARDIAC PACEMAKER: Chronic | ICD-10-CM

## 2024-10-08 PROCEDURE — 99213 OFFICE O/P EST LOW 20 MIN: CPT

## 2024-10-08 RX ORDER — LEVOFLOXACIN 500 MG/1
500 TABLET, FILM COATED ORAL DAILY
Qty: 10 | Refills: 0 | Status: ACTIVE | COMMUNITY
Start: 2024-10-08 | End: 1900-01-01

## 2024-10-10 ENCOUNTER — OUTPATIENT (OUTPATIENT)
Dept: OUTPATIENT SERVICES | Facility: HOSPITAL | Age: 89
LOS: 1 days | End: 2024-10-10
Payer: MEDICARE

## 2024-10-10 VITALS
HEIGHT: 72 IN | TEMPERATURE: 98 F | HEART RATE: 58 BPM | DIASTOLIC BLOOD PRESSURE: 75 MMHG | OXYGEN SATURATION: 97 % | WEIGHT: 173.94 LBS | SYSTOLIC BLOOD PRESSURE: 137 MMHG | RESPIRATION RATE: 16 BRPM

## 2024-10-10 DIAGNOSIS — Z01.818 ENCOUNTER FOR OTHER PREPROCEDURAL EXAMINATION: ICD-10-CM

## 2024-10-10 DIAGNOSIS — Z90.49 ACQUIRED ABSENCE OF OTHER SPECIFIED PARTS OF DIGESTIVE TRACT: Chronic | ICD-10-CM

## 2024-10-10 DIAGNOSIS — Z95.0 PRESENCE OF CARDIAC PACEMAKER: Chronic | ICD-10-CM

## 2024-10-10 DIAGNOSIS — Z95.1 PRESENCE OF AORTOCORONARY BYPASS GRAFT: Chronic | ICD-10-CM

## 2024-10-10 DIAGNOSIS — I83.893 VARICOSE VEINS OF BILATERAL LOWER EXTREMITIES WITH OTHER COMPLICATIONS: ICD-10-CM

## 2024-10-10 LAB
A1C WITH ESTIMATED AVERAGE GLUCOSE RESULT: 7.1 % — HIGH (ref 4–5.6)
ALBUMIN SERPL ELPH-MCNC: 4.2 G/DL — SIGNIFICANT CHANGE UP (ref 3.5–5.2)
ALP SERPL-CCNC: 61 U/L — SIGNIFICANT CHANGE UP (ref 30–115)
ALT FLD-CCNC: 12 U/L — SIGNIFICANT CHANGE UP (ref 0–41)
ANION GAP SERPL CALC-SCNC: 13 MMOL/L — SIGNIFICANT CHANGE UP (ref 7–14)
APTT BLD: 36 SEC — SIGNIFICANT CHANGE UP (ref 27–39.2)
AST SERPL-CCNC: 13 U/L — SIGNIFICANT CHANGE UP (ref 0–41)
BASOPHILS # BLD AUTO: 0.01 K/UL — SIGNIFICANT CHANGE UP (ref 0–0.2)
BASOPHILS NFR BLD AUTO: 0.1 % — SIGNIFICANT CHANGE UP (ref 0–1)
BILIRUB SERPL-MCNC: 0.3 MG/DL — SIGNIFICANT CHANGE UP (ref 0.2–1.2)
BUN SERPL-MCNC: 31 MG/DL — HIGH (ref 10–20)
CALCIUM SERPL-MCNC: 9.7 MG/DL — SIGNIFICANT CHANGE UP (ref 8.4–10.5)
CHLORIDE SERPL-SCNC: 106 MMOL/L — SIGNIFICANT CHANGE UP (ref 98–110)
CO2 SERPL-SCNC: 24 MMOL/L — SIGNIFICANT CHANGE UP (ref 17–32)
CREAT SERPL-MCNC: 1.5 MG/DL — SIGNIFICANT CHANGE UP (ref 0.7–1.5)
EGFR: 43 ML/MIN/1.73M2 — LOW
EOSINOPHIL # BLD AUTO: 0.07 K/UL — SIGNIFICANT CHANGE UP (ref 0–0.7)
EOSINOPHIL NFR BLD AUTO: 0.9 % — SIGNIFICANT CHANGE UP (ref 0–8)
ESTIMATED AVERAGE GLUCOSE: 157 MG/DL — HIGH (ref 68–114)
GLUCOSE SERPL-MCNC: 148 MG/DL — HIGH (ref 70–99)
HCT VFR BLD CALC: 32.3 % — LOW (ref 42–52)
HGB BLD-MCNC: 10.4 G/DL — LOW (ref 14–18)
IMM GRANULOCYTES NFR BLD AUTO: 0.8 % — HIGH (ref 0.1–0.3)
INR BLD: 1.03 RATIO — SIGNIFICANT CHANGE UP (ref 0.65–1.3)
LYMPHOCYTES # BLD AUTO: 1.11 K/UL — LOW (ref 1.2–3.4)
LYMPHOCYTES # BLD AUTO: 14.9 % — LOW (ref 20.5–51.1)
MCHC RBC-ENTMCNC: 32.2 G/DL — SIGNIFICANT CHANGE UP (ref 32–37)
MCHC RBC-ENTMCNC: 32.2 PG — HIGH (ref 27–31)
MCV RBC AUTO: 100 FL — HIGH (ref 80–94)
MONOCYTES # BLD AUTO: 0.6 K/UL — SIGNIFICANT CHANGE UP (ref 0.1–0.6)
MONOCYTES NFR BLD AUTO: 8.1 % — SIGNIFICANT CHANGE UP (ref 1.7–9.3)
NEUTROPHILS # BLD AUTO: 5.58 K/UL — SIGNIFICANT CHANGE UP (ref 1.4–6.5)
NEUTROPHILS NFR BLD AUTO: 75.2 % — SIGNIFICANT CHANGE UP (ref 42.2–75.2)
NRBC # BLD: 0 /100 WBCS — SIGNIFICANT CHANGE UP (ref 0–0)
PLATELET # BLD AUTO: 93 K/UL — LOW (ref 130–400)
PMV BLD: 11.7 FL — HIGH (ref 7.4–10.4)
POTASSIUM SERPL-MCNC: 4.6 MMOL/L — SIGNIFICANT CHANGE UP (ref 3.5–5)
POTASSIUM SERPL-SCNC: 4.6 MMOL/L — SIGNIFICANT CHANGE UP (ref 3.5–5)
PROT SERPL-MCNC: 6.7 G/DL — SIGNIFICANT CHANGE UP (ref 6–8)
PROTHROM AB SERPL-ACNC: 11.7 SEC — SIGNIFICANT CHANGE UP (ref 9.95–12.87)
RBC # BLD: 3.23 M/UL — LOW (ref 4.7–6.1)
RBC # FLD: 15.4 % — HIGH (ref 11.5–14.5)
SODIUM SERPL-SCNC: 143 MMOL/L — SIGNIFICANT CHANGE UP (ref 135–146)
WBC # BLD: 7.43 K/UL — SIGNIFICANT CHANGE UP (ref 4.8–10.8)
WBC # FLD AUTO: 7.43 K/UL — SIGNIFICANT CHANGE UP (ref 4.8–10.8)

## 2024-10-10 PROCEDURE — 80053 COMPREHEN METABOLIC PANEL: CPT

## 2024-10-10 PROCEDURE — 36415 COLL VENOUS BLD VENIPUNCTURE: CPT

## 2024-10-10 PROCEDURE — 83036 HEMOGLOBIN GLYCOSYLATED A1C: CPT

## 2024-10-10 PROCEDURE — 93010 ELECTROCARDIOGRAM REPORT: CPT

## 2024-10-10 PROCEDURE — 85025 COMPLETE CBC W/AUTO DIFF WBC: CPT

## 2024-10-10 PROCEDURE — 99214 OFFICE O/P EST MOD 30 MIN: CPT | Mod: 25

## 2024-10-10 PROCEDURE — 85730 THROMBOPLASTIN TIME PARTIAL: CPT

## 2024-10-10 PROCEDURE — 85610 PROTHROMBIN TIME: CPT

## 2024-10-10 PROCEDURE — 93005 ELECTROCARDIOGRAM TRACING: CPT

## 2024-10-10 NOTE — H&P PST ADULT - HISTORY OF PRESENT ILLNESS
Patient is a 89 year old male presenting to PAST in preparation for RIGHT LOWER EXTREMITY ANGIOGRAM POSSIBLE ENDOVASCULAR REVASCULARIZATION  due to PVD, DM, Diabetic foot ulcer on right heel and big toe (Right first Toe wound is deeper with bone exposed).    Patient denies any cp, sob, palpitations, fever, cough, URI, abdominal pains, N/V, UTI, Rashes or open wounds.  As per patient exercise tolerance is limited and ambulating with walker   Anesthesia Alert  NO--Difficult Airway  NO--History of neck surgery or radiation  NO--Limited ROM of neck  NO--History of Malignant hyperthermia  NO--Personal or family history of Pseudocholinesterase deficiency  NO--Prior Anesthesia Complication  NO--Latex Allergy  yes--Loose teeth ( upper and lower dentures)  NO--History of Rheumatoid Arthritis  NO--VIVIENNE  YES- BLEEDING RISK. Eliquis  Pt instructed to stop vitamins/supplements/herbal medications for one week prior to surgery  As per patient this is the complete medical, surgical history and medications.  Duke Activity Status Index (DASI) from [facility]  on 10/10/2024  ** All calculations should be rechecked by clinician prior to use **  RESULT SUMMARY:  7.25 points  The higher the score (maximum 58.2), the higher the functional status.  3.63 METs  INPUTS:  Take care of self —> 2.75 = Yes  Walk indoors —> 1.75 = Yes  Walk 1&ndash;2 blocks on level ground —> 2.75 = Yes  Climb a flight of stairs or walk up a hill —> 0 = No  Run a short distance —> 0 = No  Do light work around the house —> 0 = No  Do moderate work around the house —> 0 = No  Do heavy work around the house —> 0 = No  Do yardwork —> 0 = No  Have sexual relations —> 0 = No  Participate in moderate recreational activities —> 0 = No  Participate in strenuous sports —> 0 = No  Revised Cardiac Risk Index for Pre-Operative Risk from Angiodroid  on 10/10/2024  ** All calculations should be rechecked by clinician prior to use **  RESULT SUMMARY:  3 points  Class IV Risk  15.0 %  30-day risk of death, MI, or cardiac arrest  From Duceppe 2017. These numbers are higher than those from the original study (Calderon 1999). See Evidence for details.  INPUTS:  Elevated-risk surgery —> 1 = Yes  History of ischemic heart disease —> 1 = Yes  History of congestive heart failure —> 1 = Yes  History of cerebrovascular disease —> 0 = No  Pre-operative treatment with insulin —> 0 = No  Pre-operative creatinine >2 mg/dL / 176.8 µmol/L —> 0 = No

## 2024-10-10 NOTE — H&P PST ADULT - REASON FOR ADMISSION
Case Type: OP  Suite: [facility]  Proceduralist: Ruben Quiroz  Confirmed Surgery Date Time: 10- - 0:00  PAST Date Time: 10- - 7:30  Procedure: RIGHT LOWER EXTREMITY ANGIOGRAM POSSIBLE ENDOVASCULAR REVASCULARIZATION  Laterality: Right  Length of Procedure: 45 Minutes  Anesthesia Type: Local Standby

## 2024-10-10 NOTE — H&P PST ADULT - LIVES WITH, PROFILE
MD BRANDON Navarrete Asif Md ~  [facility] Clinical Support Pool  Can we can we look into case management services and a referral to memory clinic as suggested by Mima Lara.  Thanks          alone

## 2024-10-11 DIAGNOSIS — I83.893 VARICOSE VEINS OF BILATERAL LOWER EXTREMITIES WITH OTHER COMPLICATIONS: ICD-10-CM

## 2024-10-11 DIAGNOSIS — Z01.818 ENCOUNTER FOR OTHER PREPROCEDURAL EXAMINATION: ICD-10-CM

## 2024-10-13 ENCOUNTER — EMERGENCY (EMERGENCY)
Facility: HOSPITAL | Age: 89
LOS: 0 days | Discharge: HOME | End: 2024-10-13
Attending: STUDENT IN AN ORGANIZED HEALTH CARE EDUCATION/TRAINING PROGRAM
Payer: MEDICARE

## 2024-10-13 VITALS
RESPIRATION RATE: 18 BRPM | DIASTOLIC BLOOD PRESSURE: 65 MMHG | TEMPERATURE: 98 F | SYSTOLIC BLOOD PRESSURE: 135 MMHG | HEIGHT: 72 IN | HEART RATE: 89 BPM | OXYGEN SATURATION: 98 %

## 2024-10-13 DIAGNOSIS — Z95.0 PRESENCE OF CARDIAC PACEMAKER: Chronic | ICD-10-CM

## 2024-10-13 DIAGNOSIS — Z95.1 PRESENCE OF AORTOCORONARY BYPASS GRAFT: Chronic | ICD-10-CM

## 2024-10-13 DIAGNOSIS — Z90.49 ACQUIRED ABSENCE OF OTHER SPECIFIED PARTS OF DIGESTIVE TRACT: Chronic | ICD-10-CM

## 2024-10-13 LAB
ALBUMIN SERPL ELPH-MCNC: 4.3 G/DL — SIGNIFICANT CHANGE UP (ref 3.5–5.2)
ALP SERPL-CCNC: 65 U/L — SIGNIFICANT CHANGE UP (ref 30–115)
ALT FLD-CCNC: 16 U/L — SIGNIFICANT CHANGE UP (ref 0–41)
ANION GAP SERPL CALC-SCNC: 13 MMOL/L — SIGNIFICANT CHANGE UP (ref 7–14)
APPEARANCE UR: CLEAR — SIGNIFICANT CHANGE UP
APTT BLD: 35.4 SEC — SIGNIFICANT CHANGE UP (ref 27–39.2)
AST SERPL-CCNC: 16 U/L — SIGNIFICANT CHANGE UP (ref 0–41)
BASOPHILS # BLD AUTO: 0.02 K/UL — SIGNIFICANT CHANGE UP (ref 0–0.2)
BASOPHILS NFR BLD AUTO: 0.2 % — SIGNIFICANT CHANGE UP (ref 0–1)
BILIRUB SERPL-MCNC: 0.3 MG/DL — SIGNIFICANT CHANGE UP (ref 0.2–1.2)
BILIRUB UR-MCNC: NEGATIVE — SIGNIFICANT CHANGE UP
BUN SERPL-MCNC: 36 MG/DL — HIGH (ref 10–20)
CALCIUM SERPL-MCNC: 9.7 MG/DL — SIGNIFICANT CHANGE UP (ref 8.4–10.5)
CHLORIDE SERPL-SCNC: 107 MMOL/L — SIGNIFICANT CHANGE UP (ref 98–110)
CO2 SERPL-SCNC: 22 MMOL/L — SIGNIFICANT CHANGE UP (ref 17–32)
COLOR SPEC: YELLOW — SIGNIFICANT CHANGE UP
CREAT SERPL-MCNC: 1.9 MG/DL — HIGH (ref 0.7–1.5)
DIFF PNL FLD: NEGATIVE — SIGNIFICANT CHANGE UP
EGFR: 32 ML/MIN/1.73M2 — LOW
EOSINOPHIL # BLD AUTO: 0.1 K/UL — SIGNIFICANT CHANGE UP (ref 0–0.7)
EOSINOPHIL NFR BLD AUTO: 1.1 % — SIGNIFICANT CHANGE UP (ref 0–8)
GLUCOSE SERPL-MCNC: 121 MG/DL — HIGH (ref 70–99)
GLUCOSE UR QL: >=1000 MG/DL
HCT VFR BLD CALC: 32.3 % — LOW (ref 42–52)
HGB BLD-MCNC: 10.3 G/DL — LOW (ref 14–18)
IMM GRANULOCYTES NFR BLD AUTO: 0.7 % — HIGH (ref 0.1–0.3)
INR BLD: 1.17 RATIO — SIGNIFICANT CHANGE UP (ref 0.65–1.3)
KETONES UR-MCNC: NEGATIVE MG/DL — SIGNIFICANT CHANGE UP
LACTATE SERPL-SCNC: 1.4 MMOL/L — SIGNIFICANT CHANGE UP (ref 0.7–2)
LACTATE SERPL-SCNC: 2.9 MMOL/L — HIGH (ref 0.7–2)
LEUKOCYTE ESTERASE UR-ACNC: NEGATIVE — SIGNIFICANT CHANGE UP
LIDOCAIN IGE QN: 33 U/L — SIGNIFICANT CHANGE UP (ref 7–60)
LYMPHOCYTES # BLD AUTO: 1.43 K/UL — SIGNIFICANT CHANGE UP (ref 1.2–3.4)
LYMPHOCYTES # BLD AUTO: 16 % — LOW (ref 20.5–51.1)
MCHC RBC-ENTMCNC: 31.9 G/DL — LOW (ref 32–37)
MCHC RBC-ENTMCNC: 32.5 PG — HIGH (ref 27–31)
MCV RBC AUTO: 101.9 FL — HIGH (ref 80–94)
MONOCYTES # BLD AUTO: 0.82 K/UL — HIGH (ref 0.1–0.6)
MONOCYTES NFR BLD AUTO: 9.2 % — SIGNIFICANT CHANGE UP (ref 1.7–9.3)
NEUTROPHILS # BLD AUTO: 6.5 K/UL — SIGNIFICANT CHANGE UP (ref 1.4–6.5)
NEUTROPHILS NFR BLD AUTO: 72.8 % — SIGNIFICANT CHANGE UP (ref 42.2–75.2)
NITRITE UR-MCNC: NEGATIVE — SIGNIFICANT CHANGE UP
NRBC # BLD: 0 /100 WBCS — SIGNIFICANT CHANGE UP (ref 0–0)
PH UR: 5.5 — SIGNIFICANT CHANGE UP (ref 5–8)
PLATELET # BLD AUTO: 103 K/UL — LOW (ref 130–400)
PMV BLD: 11.1 FL — HIGH (ref 7.4–10.4)
POTASSIUM SERPL-MCNC: 4.6 MMOL/L — SIGNIFICANT CHANGE UP (ref 3.5–5)
POTASSIUM SERPL-SCNC: 4.6 MMOL/L — SIGNIFICANT CHANGE UP (ref 3.5–5)
PROT SERPL-MCNC: 6.7 G/DL — SIGNIFICANT CHANGE UP (ref 6–8)
PROT UR-MCNC: NEGATIVE MG/DL — SIGNIFICANT CHANGE UP
PROTHROM AB SERPL-ACNC: 13.4 SEC — HIGH (ref 9.95–12.87)
RBC # BLD: 3.17 M/UL — LOW (ref 4.7–6.1)
RBC # FLD: 15.7 % — HIGH (ref 11.5–14.5)
SODIUM SERPL-SCNC: 142 MMOL/L — SIGNIFICANT CHANGE UP (ref 135–146)
SP GR SPEC: 1.02 — SIGNIFICANT CHANGE UP (ref 1–1.03)
UROBILINOGEN FLD QL: 0.2 MG/DL — SIGNIFICANT CHANGE UP (ref 0.2–1)
WBC # BLD: 8.93 K/UL — SIGNIFICANT CHANGE UP (ref 4.8–10.8)
WBC # FLD AUTO: 8.93 K/UL — SIGNIFICANT CHANGE UP (ref 4.8–10.8)

## 2024-10-13 PROCEDURE — 85025 COMPLETE CBC W/AUTO DIFF WBC: CPT

## 2024-10-13 PROCEDURE — 85730 THROMBOPLASTIN TIME PARTIAL: CPT

## 2024-10-13 PROCEDURE — 71260 CT THORAX DX C+: CPT | Mod: 26,MC

## 2024-10-13 PROCEDURE — 85610 PROTHROMBIN TIME: CPT

## 2024-10-13 PROCEDURE — 71260 CT THORAX DX C+: CPT | Mod: MC

## 2024-10-13 PROCEDURE — 72170 X-RAY EXAM OF PELVIS: CPT

## 2024-10-13 PROCEDURE — 99284 EMERGENCY DEPT VISIT MOD MDM: CPT

## 2024-10-13 PROCEDURE — 71045 X-RAY EXAM CHEST 1 VIEW: CPT

## 2024-10-13 PROCEDURE — 36415 COLL VENOUS BLD VENIPUNCTURE: CPT

## 2024-10-13 PROCEDURE — 82962 GLUCOSE BLOOD TEST: CPT

## 2024-10-13 PROCEDURE — 51702 INSERT TEMP BLADDER CATH: CPT

## 2024-10-13 PROCEDURE — 83605 ASSAY OF LACTIC ACID: CPT | Mod: 59

## 2024-10-13 PROCEDURE — 83690 ASSAY OF LIPASE: CPT

## 2024-10-13 PROCEDURE — 72170 X-RAY EXAM OF PELVIS: CPT | Mod: 26

## 2024-10-13 PROCEDURE — 36000 PLACE NEEDLE IN VEIN: CPT | Mod: XU

## 2024-10-13 PROCEDURE — 72125 CT NECK SPINE W/O DYE: CPT | Mod: MC

## 2024-10-13 PROCEDURE — 99284 EMERGENCY DEPT VISIT MOD MDM: CPT | Mod: 25

## 2024-10-13 PROCEDURE — 71045 X-RAY EXAM CHEST 1 VIEW: CPT | Mod: 26

## 2024-10-13 PROCEDURE — 99285 EMERGENCY DEPT VISIT HI MDM: CPT

## 2024-10-13 PROCEDURE — 70450 CT HEAD/BRAIN W/O DYE: CPT | Mod: MC

## 2024-10-13 PROCEDURE — 80053 COMPREHEN METABOLIC PANEL: CPT

## 2024-10-13 PROCEDURE — 74177 CT ABD & PELVIS W/CONTRAST: CPT | Mod: MC

## 2024-10-13 PROCEDURE — 74177 CT ABD & PELVIS W/CONTRAST: CPT | Mod: 26,MC

## 2024-10-13 PROCEDURE — 72125 CT NECK SPINE W/O DYE: CPT | Mod: 26,MC

## 2024-10-13 PROCEDURE — 81003 URINALYSIS AUTO W/O SCOPE: CPT

## 2024-10-13 PROCEDURE — 70450 CT HEAD/BRAIN W/O DYE: CPT | Mod: 26,MC

## 2024-10-13 RX ORDER — SODIUM CHLORIDE 0.9 % (FLUSH) 0.9 %
500 SYRINGE (ML) INJECTION ONCE
Refills: 0 | Status: COMPLETED | OUTPATIENT
Start: 2024-10-13 | End: 2024-10-13

## 2024-10-13 RX ADMIN — Medication 500 MILLILITER(S): at 17:30

## 2024-10-13 NOTE — ED PROVIDER NOTE - NSFOLLOWUPINSTRUCTIONS_ED_ALL_ED_FT
Please follow up with urology within 1 week.     Our Emergency Department Referral Coordinators will be reaching out to you in the next 24-48 hours from 9:00am to 5:00pm to schedule a follow up appointment. Please expect a phone call from the hospital in that time frame. If you do not receive a call or if you have any questions or concerns, you can reach them at   [phone].

## 2024-10-13 NOTE — CONSULT NOTE ADULT - ASSESSMENT
ASSESSMENT:  89y Peruvian speaking male w/ PMHx of PVD, Rt PVD?, DM and HTN seen as Trauma Alert s/p fall +HT, -LOC, +AC (eliquis).  Trauma assessment in ED: ABCs intact , GCS 15 , AAOx3. Pt's family member states he was in his rollator and went to stand up when he fell backwards. External signs of trauma include an occipital scalp abrasion.      Injuries identified:   -     PLAN:   - Trauma Labs: (CBC, BMP, Coags, T&S, UA, EtOH level)  Additional studies:  EKG  Utox    Trauma Imaging to include the following:  - CXR, Pelvic Xray  - CT Head,  CT C-spine, CT Chest, CT Abd/Pelvis  - Extremity films: None      Disposition pending results of above labs and imaging  Above plan discussed with Trauma attending, Dr. Louis  --------------------------------------------------------------------------------------  10-13-24 @ 15:51 ASSESSMENT:  89M PMHx PVD, Rt PVD?, DM and HTN seen as Trauma Alert s/p fall +HT, -LOC, +AC (eliquis).  Trauma assessment in ED: ABCs intact, GCS 15, A&Ox3. Pt's family member states he was in his rollator and went to stand up when he fell backwards. External signs of trauma include an occipital scalp abrasion.      Injuries identified:   - No traumatic injuries clinically or radiographically     PLAN:   - No acute intervention or further workup per trauma surgery  - Dispo per ED    Above plan discussed with Trauma attending, Dr. Louis  --------------------------------------------------------------------------------------  10-13-24 @ 15:51

## 2024-10-13 NOTE — ED ADULT NURSE NOTE - NSFALLTYPE_ED_ALL_ED
"Problem: Pain, Acute (Adult)  Goal: Identify Related Risk Factors and Signs and Symptoms  Related risk factors and signs and symptoms are identified upon initiation of Human Response Clinical Practice Guideline (CPG).   Outcome: Improving  ./53 (BP Location: Left arm)  Pulse 93  Temp 98.9  F (37.2  C) (Oral)  Resp 18  Ht 1.73 m (5' 8.11\")  Wt 72.5 kg (159 lb 14.4 oz)  SpO2 98%  BMI 24.23 kg/m2     Patient receives oxycodone solution via j-tube -none overnight; midline stapled incision, left lateral c-shape incision derm bonded, 2 lap sites CDI; CT mid anterior to H2O serous output -not replacing output per patient request; TF was turned off by patient before 2300 but turned back on to 80 mls/hr around 0300; up independently voiding and having BM's; appeared to sleep very well      " Accidental fall

## 2024-10-13 NOTE — ED ADULT NURSE NOTE - OBJECTIVE STATEMENT
elizabetha from home s/p fall -LOC + BT on eliquis trauma alert called, pt has abrasion to back of head

## 2024-10-13 NOTE — ED ADULT NURSE NOTE - NSFALLRISKINTERV_ED_ALL_ED
Assistance OOB with selected safe patient handling equipment if applicable/Assistance with ambulation/Communicate fall risk and risk factors to all staff, patient, and family/Monitor gait and stability/Provide visual cue: yellow wristband, yellow gown, etc/Reinforce activity limits and safety measures with patient and family/Call bell, personal items and telephone in reach/Instruct patient to call for assistance before getting out of bed/chair/stretcher/Non-slip footwear applied when patient is off stretcher/[place] to call system/Physically safe environment - no spills, clutter or unnecessary equipment/Purposeful Proactive Rounding/Room/bathroom lighting operational, light cord in reach

## 2024-10-13 NOTE — ED PROVIDER NOTE - PHYSICAL EXAMINATION
CONSTITUTIONAL: NAD  SKIN: Warm dry  HEAD: NC, small abrasion to posterior occiput  EYES: NL inspection  ENT: MMM  CARD: RRR  RESP: CTAB  ABD: Soft, non tender, no rebound or guarding   EXT: no pedal edema, Full range of motion of all extremities, pulses 2+ diffusely, sensation normal otherwise  NEURO: Grossly unremarkable  PSYCH: Cooperative, appropriate.

## 2024-10-13 NOTE — ED ADULT TRIAGE NOTE - HEART RATE (BEATS/MIN)
Per Dr. Marisa Hernández, informed pt of result note, as noted above. Pt verbalized understanding with no further questions or concerns at this time. Order placed for pt per verbal order with read back from Dr. Marisa Hernández 11/24/20    Lab slips mailed along with instructions and copy of results. 89

## 2024-10-13 NOTE — ED PROVIDER NOTE - OBJECTIVE STATEMENT
89-year-old male past medical history PVD on Eliquis, DM, HTN coming in status post fall.  Patient family reports that he was on his rollator when he attempted to stand up and then he fell backwards.  Denies any LOC, but has positive head trauma with bruise on back of head.  Denying any recent illnesses or any other complaints at this time.

## 2024-10-13 NOTE — ED PROVIDER NOTE - NSICDXPASTSURGICALHX_GEN_ALL_CORE_FT
PAST SURGICAL HISTORY:  History of cholecystectomy     S/P CABG (coronary artery bypass graft)     S/P placement of cardiac pacemaker     
Detail Level: Detailed

## 2024-10-13 NOTE — CONSULT NOTE ADULT - ATTENDING COMMENTS
Trauma Attending H&P Attestation    Patient seen and evaluated with the trauma team in the trauma bay upon arrival. All pertinent labs and radiographic imaging reviewed, pending final reports. Outpatient medications reviewed, including the presence of anticoagulants, if applicable. I agree with the resident's note above, including the physical exam findings, assessment and plan as documented with the following adjustments.     Trauma Level: [ ] Code  [x ] Alert  [ ] Consult [ ] Transfer in  Patient is seen at the bedside at   Activation by:  [x ] ED physician [ ] EMS  Intubated in Field? [ ] Yes [x ] No  Intubated in ED? [ ] Yes [x ] No  Intubated in Trauma [place]? [ ] Yes [x ] No    SHEYN, JUNO 89M PMHx PVD, Rt PVD?, DM and HTN seen as Trauma Alert s/p fall +HT, -LOC, +AC (eliquis).  Trauma assessment in ED: ABCs intact, GCS 15, A&Ox3. Pt's family member states he was in his rollator and went to stand up when he fell backwards. External signs of trauma include an occipital scalp abrasion.      Injuries identified:   - No traumatic injuries clinically or radiographically      Patient presented with GCS [ 15]  upon arrival to the trauma bay.  Allergies    No Known Allergies    Intolerances      On AC/Antiplatelets [x ] Yes [ ] No              [ ] NOVACs, [ ] Coumadin, [ ] ASA, [ x] Antiplatelets   Vital Signs Last 24 Hrs  T(C): 36.5 (13 Oct 2024 14:37), Max: 36.5 (13 Oct 2024 14:37)  T(F): 97.7 (13 Oct 2024 14:37), Max: 97.7 (13 Oct 2024 14:37)  HR: 89 (13 Oct 2024 14:37) (89 - 89)  BP: 135/65 (13 Oct 2024 14:37) (135/65 - 135/65)  BP(mean): --  RR: 18 (13 Oct 2024 14:37) (18 - 18)  SpO2: 98% (13 Oct 2024 14:37) (98% - 98%)    Parameters below as of 13 Oct 2024 14:37  Patient On (Oxygen Delivery Method): room air      PE:  Assessment:   PLAN  - supportive care  - GI/DVT prophylaxis  - pain management  - repeat studies as needed  - complete and follow up on trauma work up included but not limites to                          [ x] CXR [ x] PXR [ ] Extremities X-RAYs                          [x ] NCHCT [x ] C-Spine CT [x ] CT Chest [x ] CT Abdomen/Pelvis   [ ] FAST [ ] Other                          [x ] Trauma Labs    [x ] Toxicology                     I independently read and reviewed the above studies   - Follow up Consults  [ ] Neurosurgery [ ] Orthopaedics [ ] Plastics [ ] Fascial/OMFS [ ] Opthalmology   [ ] Urology  [ ] ENT  [ ] Pediatric ICU                                         [ ] SICU/SDU [ ] Burn/Burn ICU [x ] Medicine [ ] Geriatrics [ ] Cardiology/EP [ ] Hospice/Palliative Care                           - IV ABx give as indicated [ x] Yes [ ] No  - Tetanus given as indicated [x ] Yes [ ] No  - Seizures prophylaxis [x ] Yes,  [ ] No    Missy Monae MD, FACS  Trauma/ACS/Surgical Critical Care Attending

## 2024-10-13 NOTE — CONSULT NOTE ADULT - SUBJECTIVE AND OBJECTIVE BOX
TRAUMA ACTIVATION LEVEL:  CODE / ALERT  / CONSULT  ACTIVATED BY: EMS**  /  ED**  INTUBATED: YES** / NO**      MECHANISM OF INJURY:   [] Blunt     [] MVC	  [X] Fall	  [] Pedestrian Struck	  [] Motorcycle     [] Assault     [] Bicycle collision    [] Sports injury    [] Penetrating    [] Gun Shot Wound      [] Stab Wound    GCS: 15 	E: 4	V: 5	M: 6    HPI: 89y Turkish speaking male w/ PMHx of PVD, Rt PVD?, DM and HTN seen as Trauma Alert s/p fall +HT, -LOC, +AC (eliquis).  Trauma assessment in ED: ABCs intact , GCS 15 , AAOx3. Pt's family member states he was in his rollator and went to stand up when he fell backwards. External signs of trauma include an occipital scalp abrasion.     PAST MEDICAL & SURGICAL HISTORY:  PVD (peripheral vascular disease)      Diabetes      Pacemaker      H/O ischemic heart disease      HTN (hypertension)      Hypercholesteremia      Prostate disorder      S/P CABG (coronary artery bypass graft)      History of cholecystectomy      S/P placement of cardiac pacemaker          Allergies    No Known Allergies    Intolerances        Home Medications:  aspirin 81 mg oral tablet: orally once a day (10 Oct 2024 08:58)  Bisacodyl 50 mg  PRN:  (10 Oct 2024 08:57)  Crestor 5 mg oral tablet: 1 tab(s) orally once a day (10 Oct 2024 08:58)  Farxiga 10 mg oral tablet: 1 tab(s) orally once a day (10 Oct 2024 08:58)  finasteride 5 mg oral tablet: 1 tab(s) orally once a day (10 Oct 2024 08:58)  Imdur 60 mg oral tablet, extended release: 1 tab(s) orally once a day (10 Oct 2024 08:58)  Janumet 50 mg-1000 mg oral tablet: 1 tab(s) orally 2 times a day (10 Oct 2024 08:58)  Linzess 290 mcg oral capsule: 1 cap(s) orally once a day (10 Oct 2024 08:58)  magnesium oxide 400 mg oral capsule: 1 cap(s) orally once a day (10 Oct 2024 09:00)  Multiple Vitamins oral capsule: 1 cap(s) orally once a day (10 Oct 2024 08:58)  Myrbetriq 25 mg oral tablet, extended release: 1 tab(s) orally once a day (10 Oct 2024 08:58)  pioglitazone 30 mg oral tablet: 1 tab(s) orally once a day (10 Oct 2024 08:58)  propranolol 40 mg oral tablet: 1 tab(s) orally 2 times a day (10 Oct 2024 08:58)  Ranexa 500 mg oral tablet, extended release: 1 tab(s) orally 2 times a day (10 Oct 2024 08:58)  sertraline 25 mg oral tablet: 1 tab(s) orally once a day (10 Oct 2024 08:58)  tamsulosin 0.4 mg oral capsule: 1 cap(s) orally 2 times a day (10 Oct 2024 08:58)  tamsulosin 0.4 mg oral capsule: 1 cap(s) orally 2 times a day (10 Oct 2024 09:00)  Vitamin B12 1 tab daily:  (10 Oct 2024 09:00)      ROS: 10-system review is otherwise negative except HPI above.      Primary Survey:    A - airway intact  B - bilateral breath sounds and good chest rise  C - palpable pulses in all extremities  D - GCS 15 on arrival, [place]  Exposure obtained    Vital Signs Last 24 Hrs  T(C): 36.5 (13 Oct 2024 14:37), Max: 36.5 (13 Oct 2024 14:37)  T(F): 97.7 (13 Oct 2024 14:37), Max: 97.7 (13 Oct 2024 14:37)  HR: 89 (13 Oct 2024 14:37) (89 - 89)  BP: 135/65 (13 Oct 2024 14:37) (135/65 - 135/65)  BP(mean): --  RR: 18 (13 Oct 2024 14:37) (18 - 18)  SpO2: 98% (13 Oct 2024 14:37) (98% - 98%)    Parameters below as of 13 Oct 2024 14:37  Patient On (Oxygen Delivery Method): room air      Secondary Survey:   General: NAD  HEENT: EOMI, PEERLA. +occipital scalp abrasion  Neck: Soft, midline trachea. +c-spine tenderness  Chest: No chest wall tenderness, no subcutaneous emphysema   Cardiac: S1, S2, RRR  Respiratory: Bilateral breath sounds, clear and equal bilaterally  Abdomen: Soft, non-distended, +epigastric ttp, no rebound, no guarding.  Groin: Normal appearing, pelvis stable   Ext:  Moving b/l upper and lower extremities. Palpable Radial b/l UE, b/l DP palpable in LE. +right foot dressing from wounds 2/2 PVD  Back: No T/L/S spine tenderness, No palpable runoff/stepoff/deformity    Labs:  CAPILLARY BLOOD GLUCOSE      POCT Blood Glucose.: 121 mg/dL (13 Oct 2024 14:43)                          10.3   8.93  )-----------( 103      ( 13 Oct 2024 15:01 )             32.3       Auto Neutrophil %: 72.8 % (10-13-24 @ 15:01)  Auto Immature Granulocyte %: 0.7 % (10-13-24 @ 15:01)    10-13    142  |  107  |  36[H]  ----------------------------<  121[H]  4.6   |  22  |  1.9[H]      Calcium: 9.7 mg/dL (10-13-24 @ 15:01)      LFTs:             6.7  | 0.3  | 16       ------------------[65      ( 13 Oct 2024 15:01 )  4.3  | x    | 16          Lipase:33     Amylase:x         Lactate, Blood: 2.9 mmol/L (10-13-24 @ 15:01)      Coags:     13.40  ----< 1.17    ( 13 Oct 2024 15:01 )     35.4        Urinalysis Basic - ( 13 Oct 2024 15:01 )    Color: x / Appearance: x / SG: x / pH: x  Gluc: 121 mg/dL / Ketone: x  / Bili: x / Urobili: x   Blood: x / Protein: x / Nitrite: x   Leuk Esterase: x / RBC: x / WBC x   Sq Epi: x / Non Sq Epi: x / Bacteria: x      RADIOLOGY & ADDITIONAL STUDIES:  ---------------------------------------------------------------------------------------     TRAUMA ACTIVATION LEVEL:  CODE / ALERT  / CONSULT  ACTIVATED BY: EMS**  /  ED**  INTUBATED: YES** / NO**      MECHANISM OF INJURY:   [] Blunt     [] MVC	  [X] Fall	  [] Pedestrian Struck	  [] Motorcycle     [] Assault     [] Bicycle collision    [] Sports injury    [] Penetrating    [] Gun Shot Wound      [] Stab Wound    GCS: 15 	E: 4	V: 5	M: 6    HPI: 89y Qatari speaking male w/ PMHx of PVD, Rt PVD?, DM and HTN seen as Trauma Alert s/p fall +HT, -LOC, +AC (eliquis).  Trauma assessment in ED: ABCs intact , GCS 15 , AAOx3. Pt's family member states he was in his rollator and went to stand up when he fell backwards. External signs of trauma include an occipital scalp abrasion.     PAST MEDICAL & SURGICAL HISTORY:  PVD (peripheral vascular disease)      Diabetes      Pacemaker      H/O ischemic heart disease      HTN (hypertension)      Hypercholesteremia      Prostate disorder      S/P CABG (coronary artery bypass graft)      History of cholecystectomy      S/P placement of cardiac pacemaker          Allergies    No Known Allergies    Intolerances        Home Medications:  aspirin 81 mg oral tablet: orally once a day (10 Oct 2024 08:58)  Bisacodyl 50 mg  PRN:  (10 Oct 2024 08:57)  Crestor 5 mg oral tablet: 1 tab(s) orally once a day (10 Oct 2024 08:58)  Farxiga 10 mg oral tablet: 1 tab(s) orally once a day (10 Oct 2024 08:58)  finasteride 5 mg oral tablet: 1 tab(s) orally once a day (10 Oct 2024 08:58)  Imdur 60 mg oral tablet, extended release: 1 tab(s) orally once a day (10 Oct 2024 08:58)  Janumet 50 mg-1000 mg oral tablet: 1 tab(s) orally 2 times a day (10 Oct 2024 08:58)  Linzess 290 mcg oral capsule: 1 cap(s) orally once a day (10 Oct 2024 08:58)  magnesium oxide 400 mg oral capsule: 1 cap(s) orally once a day (10 Oct 2024 09:00)  Multiple Vitamins oral capsule: 1 cap(s) orally once a day (10 Oct 2024 08:58)  Myrbetriq 25 mg oral tablet, extended release: 1 tab(s) orally once a day (10 Oct 2024 08:58)  pioglitazone 30 mg oral tablet: 1 tab(s) orally once a day (10 Oct 2024 08:58)  propranolol 40 mg oral tablet: 1 tab(s) orally 2 times a day (10 Oct 2024 08:58)  Ranexa 500 mg oral tablet, extended release: 1 tab(s) orally 2 times a day (10 Oct 2024 08:58)  sertraline 25 mg oral tablet: 1 tab(s) orally once a day (10 Oct 2024 08:58)  tamsulosin 0.4 mg oral capsule: 1 cap(s) orally 2 times a day (10 Oct 2024 08:58)  tamsulosin 0.4 mg oral capsule: 1 cap(s) orally 2 times a day (10 Oct 2024 09:00)  Vitamin B12 1 tab daily:  (10 Oct 2024 09:00)      ROS: 10-system review is otherwise negative except HPI above.      Primary Survey:    A - airway intact  B - bilateral breath sounds and good chest rise  C - palpable pulses in all extremities  D - GCS 15 on arrival, [place]  Exposure obtained    Vital Signs Last 24 Hrs  T(C): 36.5 (13 Oct 2024 14:37), Max: 36.5 (13 Oct 2024 14:37)  T(F): 97.7 (13 Oct 2024 14:37), Max: 97.7 (13 Oct 2024 14:37)  HR: 89 (13 Oct 2024 14:37) (89 - 89)  BP: 135/65 (13 Oct 2024 14:37) (135/65 - 135/65)  BP(mean): --  RR: 18 (13 Oct 2024 14:37) (18 - 18)  SpO2: 98% (13 Oct 2024 14:37) (98% - 98%)    Parameters below as of 13 Oct 2024 14:37  Patient On (Oxygen Delivery Method): room air      Secondary Survey:   General: NAD  HEENT: EOMI, PEERLA. +occipital scalp abrasion  Neck: Soft, midline trachea. +c-spine tenderness  Chest: No chest wall tenderness, no subcutaneous emphysema   Cardiac: S1, S2, RRR  Respiratory: Bilateral breath sounds, clear and equal bilaterally  Abdomen: Soft, non-distended, +epigastric ttp, no rebound, no guarding.  Groin: Normal appearing, pelvis stable   Ext:  Moving b/l upper and lower extremities. Palpable Radial b/l UE, b/l DP palpable in LE. +right foot dressing from wounds 2/2 PVD  Back: No T/L/S spine tenderness, No palpable runoff/stepoff/deformity    Labs:  CAPILLARY BLOOD GLUCOSE      POCT Blood Glucose.: 121 mg/dL (13 Oct 2024 14:43)                          10.3   8.93  )-----------( 103      ( 13 Oct 2024 15:01 )             32.3       Auto Neutrophil %: 72.8 % (10-13-24 @ 15:01)  Auto Immature Granulocyte %: 0.7 % (10-13-24 @ 15:01)    10-13    142  |  107  |  36[H]  ----------------------------<  121[H]  4.6   |  22  |  1.9[H]      Calcium: 9.7 mg/dL (10-13-24 @ 15:01)      LFTs:             6.7  | 0.3  | 16       ------------------[65      ( 13 Oct 2024 15:01 )  4.3  | x    | 16          Lipase:33     Amylase:x         Lactate, Blood: 2.9 mmol/L (10-13-24 @ 15:01)      Coags:     13.40  ----< 1.17    ( 13 Oct 2024 15:01 )     35.4        Urinalysis Basic - ( 13 Oct 2024 15:01 )    Color: x / Appearance: x / SG: x / pH: x  Gluc: 121 mg/dL / Ketone: x  / Bili: x / Urobili: x   Blood: x / Protein: x / Nitrite: x   Leuk Esterase: x / RBC: x / WBC x   Sq Epi: x / Non Sq Epi: x / Bacteria: x      RADIOLOGY & ADDITIONAL STUDIES:    IMPRESSION:    CT HEAD:  No acute intracranial findings.    CT CERVICAL SPINE:  No acute cervical fracture or facet subluxation.    IMPRESSION:    CT CHEST/ABDOMEN PELVIS:     No acute thoracic/abdominal pelvic traumatic abnormality.    Moderate left hydronephrosis and right renal pelvic fullness to the level   of the distended urinary bladder. Urinary bladder wall thickening is also   noted. Correlate with urinalysis.    Questionable duodenal bulb thickening may represent duodenitis. No bowel   obstruction.    ---------------------------------------------------------------------------------------

## 2024-10-13 NOTE — ED PROVIDER NOTE - CLINICAL SUMMARY MEDICAL DECISION MAKING FREE TEXT BOX
89-year-old presented today after fall injury.  Patient evaluated by trauma team at bedside as a trauma alert.  Patient did not have any traumatic injuries.  Labs are grossly unremarkable for any acute pathology.  Lactate was downtrending.  CHASIDY was noted, likely post vs prerenal and was treated with IV fluids and keller catehter insertion due to noted retention on CT. CT scan indicative of no acute intracranial findings . CT scan of the abdomen pelvis was indicative of moderate left hydronephrosis with urinary bladder wall thickening.  Keller catheter was placed.  Patient discharged to follow-up with urology.

## 2024-10-13 NOTE — ED PROVIDER NOTE - PATIENT PORTAL LINK FT
You can access the FollowMyHealth Patient Portal offered by [facility] by registering at the following website: [email or web address]. By joining [employer]’s FollowMyHealth portal, you will also be able to view your health information using other applications (apps) compatible with our system.

## 2024-10-15 DIAGNOSIS — S91.102A UNSPECIFIED OPEN WOUND OF LEFT GREAT TOE WITHOUT DAMAGE TO NAIL, INITIAL ENCOUNTER: ICD-10-CM
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
## 2024-10-15 DIAGNOSIS — S91.302A UNSPECIFIED OPEN WOUND, LEFT FOOT, INITIAL ENCOUNTER: ICD-10-CM
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
## 2024-10-15 NOTE — CHART NOTE - NSCHARTNOTEFT_GEN_A_CORE
[facility] MRN [id number]  left voicemail   10/14 LW- same 10/15 LW [facility] MRN [id number]  left voicemail   10/14 LW- same 10/15 LW  sent to private uro chat 10/16 lw / Appt scheduled on 10/24/2024 @ 3:15 PM @ [address] [facility] with Dr caal 10/22 - BOB (Uro referral)

## 2024-10-24 ENCOUNTER — APPOINTMENT (OUTPATIENT)
Dept: UROLOGY | Facility: CLINIC | Age: 89
End: 2024-10-24
Payer: MEDICARE

## 2024-10-24 VITALS
DIASTOLIC BLOOD PRESSURE: 71 MMHG | HEIGHT: 69 IN | WEIGHT: 170 LBS | HEART RATE: 63 BPM | SYSTOLIC BLOOD PRESSURE: 128 MMHG | OXYGEN SATURATION: 99 % | BODY MASS INDEX: 25.18 KG/M2 | RESPIRATION RATE: 17 BRPM

## 2024-10-24 DIAGNOSIS — R33.8 OTHER RETENTION OF URINE: ICD-10-CM

## 2024-10-24 DIAGNOSIS — N13.30 UNSPECIFIED HYDRONEPHROSIS: ICD-10-CM

## 2024-10-24 PROCEDURE — G2211 COMPLEX E/M VISIT ADD ON: CPT

## 2024-10-24 PROCEDURE — 99205 OFFICE O/P NEW HI 60 MIN: CPT

## 2024-10-25 PROBLEM — N13.30 HYDRONEPHROSIS: Status: ACTIVE | Noted: 2024-10-25

## 2024-10-29 ENCOUNTER — TRANSCRIPTION ENCOUNTER (OUTPATIENT)
Age: 89
End: 2024-10-29

## 2024-10-29 ENCOUNTER — APPOINTMENT (OUTPATIENT)
Dept: VASCULAR SURGERY | Facility: HOSPITAL | Age: 89
End: 2024-10-29

## 2024-10-29 ENCOUNTER — OUTPATIENT (OUTPATIENT)
Dept: OUTPATIENT SERVICES | Facility: HOSPITAL | Age: 89
LOS: 1 days | Discharge: HOME | End: 2024-10-29
Payer: MEDICARE

## 2024-10-29 VITALS
WEIGHT: 149.91 LBS | RESPIRATION RATE: 16 BRPM | HEART RATE: 62 BPM | DIASTOLIC BLOOD PRESSURE: 61 MMHG | SYSTOLIC BLOOD PRESSURE: 135 MMHG | TEMPERATURE: 98 F | OXYGEN SATURATION: 100 %

## 2024-10-29 VITALS
HEART RATE: 79 BPM | RESPIRATION RATE: 15 BRPM | OXYGEN SATURATION: 99 % | DIASTOLIC BLOOD PRESSURE: 55 MMHG | SYSTOLIC BLOOD PRESSURE: 103 MMHG

## 2024-10-29 DIAGNOSIS — Z90.49 ACQUIRED ABSENCE OF OTHER SPECIFIED PARTS OF DIGESTIVE TRACT: Chronic | ICD-10-CM

## 2024-10-29 DIAGNOSIS — Z95.0 PRESENCE OF CARDIAC PACEMAKER: Chronic | ICD-10-CM

## 2024-10-29 DIAGNOSIS — I83.893 VARICOSE VEINS OF BILATERAL LOWER EXTREMITIES WITH OTHER COMPLICATIONS: ICD-10-CM

## 2024-10-29 DIAGNOSIS — Z95.1 PRESENCE OF AORTOCORONARY BYPASS GRAFT: Chronic | ICD-10-CM

## 2024-10-29 LAB
GLUCOSE BLDC GLUCOMTR-MCNC: 103 MG/DL — HIGH (ref 70–99)
GLUCOSE BLDC GLUCOMTR-MCNC: 135 MG/DL — HIGH (ref 70–99)

## 2024-10-29 PROCEDURE — C1769: CPT

## 2024-10-29 PROCEDURE — C1894: CPT

## 2024-10-29 PROCEDURE — C1725: CPT

## 2024-10-29 PROCEDURE — 36247 INS CATH ABD/L-EXT ART 3RD: CPT | Mod: 59,RT

## 2024-10-29 PROCEDURE — 82962 GLUCOSE BLOOD TEST: CPT

## 2024-10-29 PROCEDURE — C1887: CPT

## 2024-10-29 PROCEDURE — 37232: CPT | Mod: RT

## 2024-10-29 PROCEDURE — 36248 INS CATH ABD/L-EXT ART ADDL: CPT

## 2024-10-29 PROCEDURE — 73590 X-RAY EXAM OF LOWER LEG: CPT | Mod: RT

## 2024-10-29 PROCEDURE — 37228: CPT | Mod: RT

## 2024-10-29 PROCEDURE — 76937 US GUIDE VASCULAR ACCESS: CPT | Mod: 26

## 2024-10-29 PROCEDURE — C1760: CPT

## 2024-10-29 PROCEDURE — 75710 ARTERY X-RAYS ARM/LEG: CPT | Mod: 26,XU

## 2024-10-29 RX ORDER — PSYLLIUM HUSK 0.4 G
1 CAPSULE ORAL
Refills: 0 | DISCHARGE

## 2024-10-29 RX ORDER — ONDANSETRON HYDROCHLORIDE 2 MG/ML
4 INJECTION, SOLUTION INTRAMUSCULAR; INTRAVENOUS ONCE
Refills: 0 | Status: DISCONTINUED | OUTPATIENT
Start: 2024-10-29 | End: 2024-10-29

## 2024-10-29 RX ORDER — TAMSULOSIN HCL 0.4 MG
1 CAPSULE ORAL
Refills: 0 | DISCHARGE

## 2024-10-29 RX ORDER — ACETAMINOPHEN 500 MG
650 TABLET ORAL ONCE
Refills: 0 | Status: DISCONTINUED | OUTPATIENT
Start: 2024-10-29 | End: 2024-10-29

## 2024-10-29 RX ORDER — HYDROMORPHONE HCL/0.9% NACL/PF 6 MG/30 ML
0.5 PATIENT CONTROLLED ANALGESIA SYRINGE INTRAVENOUS
Refills: 0 | Status: DISCONTINUED | OUTPATIENT
Start: 2024-10-29 | End: 2024-10-29

## 2024-10-29 NOTE — ASU DISCHARGE PLAN (ADULT/PEDIATRIC) - FINANCIAL ASSISTANCE
[facility] provides services at a reduced cost to those who are determined to be eligible through [facility]’s financial assistance program. Information regarding [facility]’s financial assistance program can be found by going to [email or web address] or by calling [phone].

## 2024-10-29 NOTE — ASU DISCHARGE PLAN (ADULT/PEDIATRIC) - CARE PROVIDER_API CALL
Ruben Quiroz  Vascular Surgery  [address] [place] [address], [address]  [place] [address]  Phone: [phone]  Fax: [phone]  Established Patient  Follow Up Time: 2 weeks

## 2024-10-29 NOTE — CHART NOTE - NSCHARTNOTEFT_GEN_A_CORE
PACU ANESTHESIA ADMISSION NOTE      Procedure: Arteriogram extremity lower right      Post op diagnosis:      ____  Intubated  TV:______       Rate: ______      FiO2: ______    __x__  Patent Airway    __x__  Full return of protective reflexes    __x__  Full recovery from anesthesia / back to baseline status    Vitals:  T(C): 36.5 (10-29-24 @ 11:59), Max: 36.5 (10-29-24 @ 10:14)  HR: 62 (10-29-24 @ 11:59) (62 - 62)  BP: 135/61 (10-29-24 @ 11:59) (135/61 - 135/61)  RR: 16 (10-29-24 @ 11:59) (16 - 16)  SpO2: 100% (10-29-24 @ 11:59) (100% - 100%)    Mental Status:  __x__ Awake   ___x__ Alert   _____ Drowsy   _____ Sedated    Nausea/Vomiting:  __x__ NO  ______Yes,   See Post - Op Orders          Pain Scale (0-10):  __0___    Treatment: ____ None    __x__ See Post - Op/PCA Orders    Post - Operative Fluids:   ____ Oral   __x__ See Post - Op Orders    Plan: Discharge:   ____Home       _____Floor     _____Critical Care    _____  Other:_________________    Comments: Patient had smooth intraoperative event, no anesthesia complication.

## 2024-10-29 NOTE — BRIEF OPERATIVE NOTE - OPERATION/FINDINGS
1. US guided access R CFA (5F sheath)  2. RLE angiogram  3. Third order selective catheterization of R PTA  4. [id number] POBA R PTA and Peroneal arteries  5. Celt closure R CFA    Findings: Extremely poor flow in the infrapopliteal vessels. Long segment PT/AT/Peroneal occlusion with diminished collateralized flow to the foot.

## 2024-10-29 NOTE — ASU DISCHARGE PLAN (ADULT/PEDIATRIC) - NS MD DC FALL RISK RISK
For information on Fall & Injury Prevention, visit: [email or web address] OR  [email or web address] OR  https://www.cdc.gov/steadi/patient.html

## 2024-10-29 NOTE — ASU PATIENT PROFILE, ADULT - FALL HARM RISK - HARM RISK INTERVENTIONS

## 2024-10-31 ENCOUNTER — APPOINTMENT (OUTPATIENT)
Dept: CARDIOLOGY | Facility: CLINIC | Age: 89
End: 2024-10-31
Payer: MEDICARE

## 2024-10-31 VITALS
BODY MASS INDEX: 25.18 KG/M2 | HEIGHT: 69 IN | WEIGHT: 170 LBS | SYSTOLIC BLOOD PRESSURE: 92 MMHG | HEART RATE: 66 BPM | DIASTOLIC BLOOD PRESSURE: 50 MMHG

## 2024-10-31 DIAGNOSIS — R06.09 OTHER FORMS OF DYSPNEA: ICD-10-CM

## 2024-10-31 DIAGNOSIS — I25.119 ATHEROSCLEROTIC HEART DISEASE OF NATIVE CORONARY ARTERY WITH UNSPECIFIED ANGINA PECTORIS: ICD-10-CM

## 2024-10-31 DIAGNOSIS — I10 ESSENTIAL (PRIMARY) HYPERTENSION: ICD-10-CM

## 2024-10-31 DIAGNOSIS — E11.59 TYPE 2 DIABETES MELLITUS WITH OTHER CIRCULATORY COMPLICATIONS: ICD-10-CM

## 2024-10-31 DIAGNOSIS — I73.9 PERIPHERAL VASCULAR DISEASE, UNSPECIFIED: ICD-10-CM

## 2024-10-31 DIAGNOSIS — I25.9 CHRONIC ISCHEMIC HEART DISEASE, UNSPECIFIED: ICD-10-CM

## 2024-10-31 DIAGNOSIS — I70.235 ATHEROSCLEROSIS OF NATIVE ARTERIES OF RIGHT LEG WITH ULCERATION OF OTHER PART OF FOOT: ICD-10-CM

## 2024-10-31 DIAGNOSIS — R29.6 REPEATED FALLS: ICD-10-CM

## 2024-10-31 DIAGNOSIS — Z95.2 PRESENCE OF PROSTHETIC HEART VALVE: ICD-10-CM

## 2024-10-31 DIAGNOSIS — E78.49 OTHER HYPERLIPIDEMIA: ICD-10-CM

## 2024-10-31 PROCEDURE — 93000 ELECTROCARDIOGRAM COMPLETE: CPT

## 2024-10-31 PROCEDURE — 99214 OFFICE O/P EST MOD 30 MIN: CPT

## 2024-10-31 RX ORDER — ISOSORBIDE MONONITRATE 60 MG
60 TABLET, EXTENDED RELEASE 24 HR ORAL DAILY
Refills: 0 | Status: ACTIVE | COMMUNITY

## 2024-10-31 RX ORDER — BISACODYL 5 MG/1
5 TABLET ORAL
Refills: 0 | Status: ACTIVE | COMMUNITY

## 2024-10-31 RX ORDER — RANOLAZINE 500 MG/1
500 TABLET, FILM COATED, EXTENDED RELEASE ORAL
Refills: 0 | Status: ACTIVE | COMMUNITY

## 2024-11-04 ENCOUNTER — APPOINTMENT (OUTPATIENT)
Dept: UROLOGY | Facility: CLINIC | Age: 89
End: 2024-11-04
Payer: MEDICARE

## 2024-11-04 PROCEDURE — 99215 OFFICE O/P EST HI 40 MIN: CPT | Mod: 25

## 2024-11-04 PROCEDURE — 51703 INSERT BLADDER CATH COMPLEX: CPT

## 2024-11-06 DIAGNOSIS — I83.893 VARICOSE VEINS OF BILATERAL LOWER EXTREMITIES WITH OTHER COMPLICATIONS: ICD-10-CM

## 2024-11-06 DIAGNOSIS — I77.1 STRICTURE OF ARTERY: ICD-10-CM

## 2024-11-06 DIAGNOSIS — S91.301A UNSPECIFIED OPEN WOUND, RIGHT FOOT, INITIAL ENCOUNTER: ICD-10-CM
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
## 2024-11-06 DIAGNOSIS — Z79.84 LONG TERM (CURRENT) USE OF ORAL HYPOGLYCEMIC DRUGS: ICD-10-CM
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
## 2024-11-06 DIAGNOSIS — E11.51 TYPE 2 DIABETES MELLITUS WITH DIABETIC PERIPHERAL ANGIOPATHY WITHOUT GANGRENE: ICD-10-CM

## 2024-11-13 ENCOUNTER — APPOINTMENT (OUTPATIENT)
Dept: VASCULAR SURGERY | Facility: CLINIC | Age: 89
End: 2024-11-13
Payer: MEDICARE

## 2024-11-13 VITALS — HEIGHT: 69 IN | BODY MASS INDEX: 25.18 KG/M2 | WEIGHT: 170 LBS

## 2024-11-13 VITALS — SYSTOLIC BLOOD PRESSURE: 146 MMHG | DIASTOLIC BLOOD PRESSURE: 79 MMHG | HEART RATE: 64 BPM

## 2024-11-13 DIAGNOSIS — I70.235 ATHEROSCLEROSIS OF NATIVE ARTERIES OF RIGHT LEG WITH ULCERATION OF OTHER PART OF FOOT: ICD-10-CM

## 2024-11-13 PROCEDURE — 99212 OFFICE O/P EST SF 10 MIN: CPT

## 2024-11-13 PROCEDURE — 93926 LOWER EXTREMITY STUDY: CPT | Mod: RT

## 2024-11-21 ENCOUNTER — APPOINTMENT (OUTPATIENT)
Dept: CARDIOLOGY | Facility: CLINIC | Age: 89
End: 2024-11-21
Payer: MEDICARE

## 2024-11-21 ENCOUNTER — NON-APPOINTMENT (OUTPATIENT)
Age: 89
End: 2024-11-21

## 2024-11-21 PROCEDURE — 93294 REM INTERROG EVL PM/LDLS PM: CPT

## 2024-11-21 PROCEDURE — 93296 REM INTERROG EVL PM/IDS: CPT

## 2024-12-02 ENCOUNTER — APPOINTMENT (OUTPATIENT)
Dept: UROLOGY | Facility: CLINIC | Age: 88
End: 2024-12-02
Payer: MEDICARE

## 2024-12-02 PROCEDURE — 51703 INSERT BLADDER CATH COMPLEX: CPT

## 2024-12-06 ENCOUNTER — APPOINTMENT (OUTPATIENT)
Dept: UROLOGY | Facility: CLINIC | Age: 88
End: 2024-12-06
Payer: MEDICARE

## 2024-12-06 ENCOUNTER — NON-APPOINTMENT (OUTPATIENT)
Age: 88
End: 2024-12-06

## 2024-12-06 DIAGNOSIS — Z97.8 PRESENCE OF OTHER SPECIFIED DEVICES: ICD-10-CM

## 2024-12-06 PROCEDURE — 51700 IRRIGATION OF BLADDER: CPT

## 2024-12-06 PROCEDURE — A4217: CPT | Mod: NC

## 2024-12-06 RX ORDER — CEFPODOXIME PROXETIL 100 MG/1
100 TABLET, FILM COATED ORAL
Qty: 14 | Refills: 1 | Status: ACTIVE | COMMUNITY
Start: 2024-12-06 | End: 1900-01-01

## 2024-12-09 ENCOUNTER — EMERGENCY (EMERGENCY)
Facility: HOSPITAL | Age: 88
LOS: 0 days | Discharge: HOME | End: 2024-12-09
Attending: EMERGENCY MEDICINE
Payer: MEDICARE

## 2024-12-09 ENCOUNTER — EMERGENCY (EMERGENCY)
Facility: HOSPITAL | Age: 88
LOS: 0 days | Discharge: HOME | End: 2024-12-10
Attending: EMERGENCY MEDICINE

## 2024-12-09 ENCOUNTER — NON-APPOINTMENT (OUTPATIENT)
Age: 88
End: 2024-12-09

## 2024-12-09 VITALS
SYSTOLIC BLOOD PRESSURE: 112 MMHG | DIASTOLIC BLOOD PRESSURE: 70 MMHG | WEIGHT: 149.91 LBS | HEART RATE: 88 BPM | TEMPERATURE: 98 F | OXYGEN SATURATION: 97 % | RESPIRATION RATE: 18 BRPM | HEIGHT: 72 IN

## 2024-12-09 VITALS
TEMPERATURE: 98 F | OXYGEN SATURATION: 99 % | HEIGHT: 72 IN | RESPIRATION RATE: 16 BRPM | DIASTOLIC BLOOD PRESSURE: 70 MMHG | WEIGHT: 149.91 LBS | SYSTOLIC BLOOD PRESSURE: 111 MMHG | HEART RATE: 60 BPM

## 2024-12-09 DIAGNOSIS — R10.2 PELVIC AND PERINEAL PAIN: ICD-10-CM

## 2024-12-09 DIAGNOSIS — I10 ESSENTIAL (PRIMARY) HYPERTENSION: ICD-10-CM

## 2024-12-09 DIAGNOSIS — T83.9XXA UNSPECIFIED COMPLICATION OF GENITOURINARY PROSTHETIC DEVICE, IMPLANT AND GRAFT, INITIAL ENCOUNTER: ICD-10-CM

## 2024-12-09 DIAGNOSIS — E78.5 HYPERLIPIDEMIA, UNSPECIFIED: ICD-10-CM

## 2024-12-09 DIAGNOSIS — Z86.718 PERSONAL HISTORY OF OTHER VENOUS THROMBOSIS AND EMBOLISM: ICD-10-CM

## 2024-12-09 DIAGNOSIS — Z96.0 PRESENCE OF UROGENITAL IMPLANTS: ICD-10-CM

## 2024-12-09 DIAGNOSIS — Z95.1 PRESENCE OF AORTOCORONARY BYPASS GRAFT: ICD-10-CM

## 2024-12-09 DIAGNOSIS — Z87.448 PERSONAL HISTORY OF OTHER DISEASES OF URINARY SYSTEM: ICD-10-CM

## 2024-12-09 DIAGNOSIS — Z95.0 PRESENCE OF CARDIAC PACEMAKER: Chronic | ICD-10-CM

## 2024-12-09 DIAGNOSIS — Z95.1 PRESENCE OF AORTOCORONARY BYPASS GRAFT: Chronic | ICD-10-CM

## 2024-12-09 DIAGNOSIS — E11.9 TYPE 2 DIABETES MELLITUS WITHOUT COMPLICATIONS: ICD-10-CM

## 2024-12-09 DIAGNOSIS — Z90.49 ACQUIRED ABSENCE OF OTHER SPECIFIED PARTS OF DIGESTIVE TRACT: Chronic | ICD-10-CM

## 2024-12-09 DIAGNOSIS — I25.10 ATHEROSCLEROTIC HEART DISEASE OF NATIVE CORONARY ARTERY WITHOUT ANGINA PECTORIS: ICD-10-CM

## 2024-12-09 DIAGNOSIS — Z79.01 LONG TERM (CURRENT) USE OF ANTICOAGULANTS: ICD-10-CM

## 2024-12-09 DIAGNOSIS — Z95.0 PRESENCE OF CARDIAC PACEMAKER: ICD-10-CM

## 2024-12-09 DIAGNOSIS — T83.84XA PAIN DUE TO GENITOURINARY PROSTHETIC DEVICES, IMPLANTS AND GRAFTS, INITIAL ENCOUNTER: ICD-10-CM

## 2024-12-09 PROCEDURE — 99284 EMERGENCY DEPT VISIT MOD MDM: CPT

## 2024-12-09 PROCEDURE — 99282 EMERGENCY DEPT VISIT SF MDM: CPT

## 2024-12-09 PROCEDURE — 99283 EMERGENCY DEPT VISIT LOW MDM: CPT

## 2024-12-09 RX ORDER — FLUCONAZOLE 200 MG/1
200 TABLET ORAL DAILY
Qty: 5 | Refills: 0 | Status: ACTIVE | COMMUNITY
Start: 2024-12-09 | End: 1900-01-01

## 2024-12-09 NOTE — ED PROVIDER NOTE - PATIENT PORTAL LINK FT
You can access the FollowMyHealth Patient Portal offered by [facility] by registering at the following website: [email or web address]. By joining [employer]’s FollowMyHealth portal, you will also be able to view your health information using other applications (apps) compatible with our system.

## 2024-12-09 NOTE — ED PROVIDER NOTE - CARE PROVIDER_API CALL
Lew Haddad  Urology  [address] [place] [id number]  Phone: [phone]  Fax: [phone]  Established Patient  Follow Up Time: 7-10 Days

## 2024-12-09 NOTE — ED PROVIDER NOTE - PHYSICAL EXAMINATION
elderly M nad  skin warm, dry  ncat  neck supple  rrr nl s1s2 no mrg  ctab no wrr  abd soft ntnd no palpable masses no rgr  gu- indwelling keller in place with scant clear yellow urine in leg bag  back non-tender no cvat  ext no cce dpi  neuro aaox3 grossly nf exam

## 2024-12-09 NOTE — ED PROVIDER NOTE - PATIENT PORTAL LINK FT
You can access the FollowMyHealth Patient Portal offered by [facility] by registering at the following website: [email or web address]. By joining [email or web address]’s FollowMyHealth portal, you will also be able to view your health information using other applications (apps) compatible with our system.

## 2024-12-09 NOTE — ED ADULT NURSE NOTE - CHIEF COMPLAINT QUOTE
Pt BIBEMS c/o urinary retention. Has chronic keller/ UTI being treated w/ abx. Has not had output since this morning. Family flushed keller with some relief

## 2024-12-09 NOTE — ED PROVIDER NOTE - OBJECTIVE STATEMENT
89-year-old male history of urinary retention recent Ritchie placement 2 weeks ago presents today for catheter issues.  Patient is accompanied by his 2 daughters who said they tried to flush the Ritchie yesterday when they were called by the help that only 100 mL had come out.  Patient complaining of suprapubic pain denying fevers chills or dysuria.  Patient has outpatient follow-up with urologist coming up.

## 2024-12-09 NOTE — ED PROVIDER NOTE - PHYSICAL EXAMINATION
CONSTITUTIONAL: Well-developed; well-nourished; in no acute distress.   SKIN: warm, dry  HEAD: Normocephalic; atraumatic.  EYES: PERRL, EOMI, no conjunctival erythema  ENT: No nasal discharge; airway clear.  NECK: Supple; non tender.  CARD: S1, S2 normal; no murmurs, gallops, or rubs. Regular rate and rhythm.   RESP: No wheezes, rales or rhonchi.  ABD: suprapubic tenderness   EXT: Normal ROM.  No clubbing, cyanosis or edema.   LYMPH: No acute cervical adenopathy.  NEURO: Alert, oriented, grossly unremarkable  PSYCH: Cooperative, appropriate.  : chronic keller

## 2024-12-09 NOTE — ED PROVIDER NOTE - NSFOLLOWUPINSTRUCTIONS_ED_ALL_ED_FT
Ritchie Catheter Care, Adult    A Ritchie catheter is a soft, flexible tube that is placed into the bladder to drain urine. A Ritchie catheter may be inserted if:    You leak urine or are not able to control when you urinate (urinary incontinence).  You are not able to urinate when you need to (urinary retention).   You had prostate surgery or surgery on the genitals.  You have certain medical conditions, such as multiple sclerosis, dementia, or a spinal cord injury.    If you are going home with a Ritchie catheter in place, follow the instructions below.    TAKING CARE OF THE CATHETER   Wash your hands with soap and water.   Using mild soap and warm water on a clean washcloth:    Clean the area on your body closest to the catheter insertion site using a circular motion, moving away from the catheter. Never wipe toward the catheter because this could sweep bacteria up into the urethra and cause infection.   Remove all traces of soap. Pat the area dry with a clean towel. For males, reposition the foreskin.    Attach the catheter to your leg so there is no tension on the catheter. Use adhesive tape or a leg strap. If you are using adhesive tape, remove any sticky residue left behind by the previous tape you used.   Keep the drainage bag below the level of the bladder, but keep it off the floor.   Check throughout the day to be sure the catheter is working and urine is draining freely. Make sure the tubing does not become kinked.  Do not pull on the catheter or try to remove it. Pulling could damage internal tissues.    TAKING CARE OF THE DRAINAGE BAGS  You will be given two drainage bags to take home. One is a large overnight drainage bag, and the other is a smaller leg bag that fits underneath clothing. You may wear the overnight bag at any time, but you should never wear the smaller leg bag at night. Follow the instructions below for how to empty, change, and clean your drainage bags.    Emptying the Drainage Bag    You must empty your drainage bag when it is ?–½ full or at least 2–3 times a day.     Wash your hands with soap and water.   Keep the drainage bag below your hips, below the level of your bladder. This stops urine from going back into the tubing and into your bladder.    Hold the dirty bag over the toilet or a clean container.   Open the pour spout at the bottom of the bag and empty the urine into the toilet or container. Do not let the pour spout touch the toilet, container, or any other surface. Doing so can place bacteria on the bag, which can cause an infection.   Clean the pour spout with a gauze pad or cotton ball that has rubbing alcohol on it.   Close the pour spout.   Attach the bag to your leg with adhesive tape or a leg strap.   Wash your hands well.    Changing the Drainage Bag    Change your drainage bag once a month or sooner if it starts to smell bad or look dirty. Below are steps to follow when changing the drainage bag.     Wash your hands with soap and water.   Pinch off the rubber catheter so that urine does not spill out.   Disconnect the catheter tube from the drainage tube at the connection valve. Do not let the tubes touch any surface.    Clean the end of the catheter tube with an alcohol wipe. Use a different alcohol wipe to clean the end of the drainage tube.   Connect the catheter tube to the drainage tube of the clean drainage bag.   Attach the new bag to the leg with adhesive tape or a leg strap. Avoid attaching the new bag too tightly.    Wash your hands well.    Cleaning the Drainage Bag     Wash your hands with soap and water.   Wash the bag in warm, soapy water.   Rinse the bag thoroughly with warm water.   Fill the bag with a solution of white vinegar and water (1 cup vinegar to 1 qt warm water [.2 L vinegar to 1 L warm water]). Close the bag and soak it for 30 minutes in the solution.    Rinse the bag with warm water.    Hang the bag to dry with the pour spout open and hanging downward.   Store the clean bag (once it is dry) in a clean plastic bag.   Wash your hands well.     PREVENTING INFECTION  Wash your hands before and after handling your catheter.  Take showers daily and wash the area where the catheter enters your body. Do not take baths. Replace wet leg straps with dry ones, if this applies.  Do not use powders, sprays, or lotions on the genital area. Only use creams, lotions, or ointments as directed by your caregiver.  For females, wipe from front to back after each bowel movement.   Drink enough fluids to keep your urine clear or pale yellow unless you have a fluid restriction.   Do not let the drainage bag or tubing touch or lie on the floor.   Wear cotton underwear to absorb moisture and to keep your .    SEEK MEDICAL CARE IF:  Your urine is cloudy or smells unusually bad.  Your catheter becomes clogged.  You are not draining urine into the bag or your bladder feels full.  Your catheter starts to leak.    SEEK IMMEDIATE MEDICAL CARE IF:  You have pain, swelling, redness, or pus where the catheter enters the body.  You have pain in the abdomen, legs, lower back, or bladder.  You have a fever.  You see blood fill the catheter, or your urine is pink or red.  You have nausea, vomiting, or chills.  Your catheter gets pulled out.    MAKE SURE YOU:  Understand these instructions.  Will watch your condition.  Will get help right away if you are not doing well or get worse.    ADDITIONAL NOTES AND INSTRUCTIONS    Please follow up with your Primary MD in 24-48 hr.  Seek immediate medical care for any new/worsening signs or symptoms.

## 2024-12-09 NOTE — ED ADULT TRIAGE NOTE - CHIEF COMPLAINT QUOTE
Pt BIBEMS c/o urinary retention. Has chronic keller UTI being treated for abx. Has not had output since this morning. Family flushed keller with some relief Pt BIBEMS c/o urinary retention. Has chronic keller/ UTI being treated w/ abx. Has not had output since this morning. Family flushed keller with some relief

## 2024-12-09 NOTE — ED PROVIDER NOTE - OBJECTIVE STATEMENT
· Chief Complaint Quote	pt BIBA from home for a clogged Keller. pt hasn't been able to urinate since 1pm.  · Chief Complaint	urinary catheter complications    89M pmh cad/cabg, dvt on elliquis, s/p ppm, htn, hl, indwelling keller x ~2 mos p/w decr keller drainage since 1pm. Seen in ED yest for same, was flushed and began draining again. Daughters tried to flush with NS 500cc at home but report little/no return. Is currently on abx for recently dx uti by Uro Dr. caal. Pt denies any sx - no nv, abd pain, flank pain.

## 2024-12-09 NOTE — ED PROVIDER NOTE - CLINICAL SUMMARY MEDICAL DECISION MAKING FREE TEXT BOX
89 yo man w/ HTN, HLD, CAD, DM chronic keller catheter here w/ urinary retention  family flushed catheter earlier today w/ improvement in pain but minimal drainage  pt w/o complaints at this time    POCUS shows significant volume of urine in bladder  keller flushes freely and draining in ED after flushing    Will d/c home w/ outpatient f/u

## 2024-12-09 NOTE — ED ADULT NURSE NOTE - NSFALLHARMRISKINTERV_ED_ALL_ED
Assistance OOB with selected safe patient handling equipment if applicable/Assistance with ambulation/Communicate risk of Fall with Harm to all staff, patient, and family/Monitor gait and stability/Provide visual cue: red socks, yellow wristband, yellow gown, etc/Reinforce activity limits and safety measures with patient and family/Bed in lowest position, wheels locked, appropriate side rails in place/Call bell, personal items and telephone in reach/Instruct patient to call for assistance before getting out of bed/chair/stretcher/Non-slip footwear applied when patient is off stretcher/[place] to call system/Physically safe environment - no spills, clutter or unnecessary equipment/Purposeful Proactive Rounding/Room/bathroom lighting operational, light cord in reach

## 2024-12-10 ENCOUNTER — APPOINTMENT (OUTPATIENT)
Dept: UROLOGY | Facility: CLINIC | Age: 88
End: 2024-12-10
Payer: MEDICARE

## 2024-12-10 DIAGNOSIS — N13.30 UNSPECIFIED HYDRONEPHROSIS: ICD-10-CM

## 2024-12-10 DIAGNOSIS — R33.8 OTHER RETENTION OF URINE: ICD-10-CM

## 2024-12-10 DIAGNOSIS — N40.1 BENIGN PROSTATIC HYPERPLASIA WITH LOWER URINARY TRACT SYMPMS: ICD-10-CM

## 2024-12-10 DIAGNOSIS — T83.9XXA UNSPECIFIED COMPLICATION OF GENITOURINARY PROSTHETIC DEVICE, IMPLANT AND GRAFT, INITIAL ENCOUNTER: ICD-10-CM

## 2024-12-10 DIAGNOSIS — N13.8 BENIGN PROSTATIC HYPERPLASIA WITH LOWER URINARY TRACT SYMPMS: ICD-10-CM

## 2024-12-10 PROCEDURE — 99214 OFFICE O/P EST MOD 30 MIN: CPT | Mod: 25

## 2024-12-10 PROCEDURE — 51703 INSERT BLADDER CATH COMPLEX: CPT

## 2024-12-26 ENCOUNTER — NON-APPOINTMENT (OUTPATIENT)
Age: 88
End: 2024-12-26

## 2024-12-26 ENCOUNTER — APPOINTMENT (OUTPATIENT)
Dept: ELECTROPHYSIOLOGY | Facility: CLINIC | Age: 88
End: 2024-12-26
Payer: MEDICARE

## 2024-12-26 VITALS
HEIGHT: 69 IN | BODY MASS INDEX: 22.96 KG/M2 | HEART RATE: 60 BPM | WEIGHT: 155 LBS | DIASTOLIC BLOOD PRESSURE: 72 MMHG | SYSTOLIC BLOOD PRESSURE: 116 MMHG

## 2024-12-26 DIAGNOSIS — R00.1 BRADYCARDIA, UNSPECIFIED: ICD-10-CM

## 2024-12-26 DIAGNOSIS — I10 ESSENTIAL (PRIMARY) HYPERTENSION: ICD-10-CM

## 2024-12-26 DIAGNOSIS — Z45.018 ENCOUNTER FOR ADJUSTMENT AND MANAGEMENT OF OTHER PART OF CARDIAC PACEMAKER: ICD-10-CM

## 2024-12-26 PROCEDURE — 93280 PM DEVICE PROGR EVAL DUAL: CPT

## 2024-12-26 PROCEDURE — 99213 OFFICE O/P EST LOW 20 MIN: CPT

## 2025-01-01 ENCOUNTER — INPATIENT (INPATIENT)
Facility: HOSPITAL | Age: 89
LOS: 6 days | DRG: 698 | End: 2025-06-06
Attending: INTERNAL MEDICINE | Admitting: STUDENT IN AN ORGANIZED HEALTH CARE EDUCATION/TRAINING PROGRAM
Payer: MEDICARE

## 2025-01-01 ENCOUNTER — APPOINTMENT (OUTPATIENT)
Dept: UROLOGY | Facility: CLINIC | Age: 89
End: 2025-01-01

## 2025-01-01 VITALS
DIASTOLIC BLOOD PRESSURE: 83 MMHG | HEART RATE: 109 BPM | RESPIRATION RATE: 18 BRPM | SYSTOLIC BLOOD PRESSURE: 149 MMHG | TEMPERATURE: 102 F | OXYGEN SATURATION: 96 %

## 2025-01-01 VITALS
HEART RATE: 67 BPM | TEMPERATURE: 96 F | DIASTOLIC BLOOD PRESSURE: 59 MMHG | OXYGEN SATURATION: 96 % | SYSTOLIC BLOOD PRESSURE: 149 MMHG | HEIGHT: 72 IN | RESPIRATION RATE: 15 BRPM | WEIGHT: 139.99 LBS

## 2025-01-01 DIAGNOSIS — Z51.5 ENCOUNTER FOR PALLIATIVE CARE: ICD-10-CM

## 2025-01-01 DIAGNOSIS — R62.7 ADULT FAILURE TO THRIVE: ICD-10-CM

## 2025-01-01 DIAGNOSIS — R09.89 OTHER SPECIFIED SYMPTOMS AND SIGNS INVOLVING THE CIRCULATORY AND RESPIRATORY SYSTEMS: ICD-10-CM

## 2025-01-01 DIAGNOSIS — G93.41 METABOLIC ENCEPHALOPATHY: ICD-10-CM

## 2025-01-01 DIAGNOSIS — Z90.49 ACQUIRED ABSENCE OF OTHER SPECIFIED PARTS OF DIGESTIVE TRACT: Chronic | ICD-10-CM

## 2025-01-01 DIAGNOSIS — R41.89 OTHER SYMPTOMS AND SIGNS INVOLVING COGNITIVE FUNCTIONS AND AWARENESS: ICD-10-CM

## 2025-01-01 DIAGNOSIS — Z95.0 PRESENCE OF CARDIAC PACEMAKER: Chronic | ICD-10-CM

## 2025-01-01 DIAGNOSIS — K59.00 CONSTIPATION, UNSPECIFIED: ICD-10-CM

## 2025-01-01 DIAGNOSIS — Z95.1 PRESENCE OF AORTOCORONARY BYPASS GRAFT: Chronic | ICD-10-CM

## 2025-01-01 LAB
A1C WITH ESTIMATED AVERAGE GLUCOSE RESULT: 7.3 % — HIGH (ref 4–5.6)
ALBUMIN SERPL ELPH-MCNC: 3.3 G/DL — LOW (ref 3.5–5.2)
ALBUMIN SERPL ELPH-MCNC: 3.4 G/DL — LOW (ref 3.5–5.2)
ALP SERPL-CCNC: 100 U/L — SIGNIFICANT CHANGE UP (ref 30–115)
ALP SERPL-CCNC: 116 U/L — HIGH (ref 30–115)
ALT FLD-CCNC: 21 U/L — SIGNIFICANT CHANGE UP (ref 0–41)
ALT FLD-CCNC: 21 U/L — SIGNIFICANT CHANGE UP (ref 0–41)
ANION GAP SERPL CALC-SCNC: 11 MMOL/L — SIGNIFICANT CHANGE UP (ref 7–14)
ANION GAP SERPL CALC-SCNC: 12 MMOL/L — SIGNIFICANT CHANGE UP (ref 7–14)
ANION GAP SERPL CALC-SCNC: 13 MMOL/L — SIGNIFICANT CHANGE UP (ref 7–14)
ANION GAP SERPL CALC-SCNC: 14 MMOL/L — SIGNIFICANT CHANGE UP (ref 7–14)
ANION GAP SERPL CALC-SCNC: 14 MMOL/L — SIGNIFICANT CHANGE UP (ref 7–14)
ANION GAP SERPL CALC-SCNC: 15 MMOL/L — HIGH (ref 7–14)
APPEARANCE UR: ABNORMAL
APTT BLD: 31 SEC — SIGNIFICANT CHANGE UP (ref 27–39.2)
AST SERPL-CCNC: 26 U/L — SIGNIFICANT CHANGE UP (ref 0–41)
AST SERPL-CCNC: 51 U/L — HIGH (ref 0–41)
BASOPHILS # BLD AUTO: 0.01 K/UL — SIGNIFICANT CHANGE UP (ref 0–0.2)
BASOPHILS # BLD AUTO: 0.02 K/UL — SIGNIFICANT CHANGE UP (ref 0–0.2)
BASOPHILS NFR BLD AUTO: 0.1 % — SIGNIFICANT CHANGE UP (ref 0–1)
BASOPHILS NFR BLD AUTO: 0.2 % — SIGNIFICANT CHANGE UP (ref 0–1)
BILIRUB SERPL-MCNC: 0.2 MG/DL — SIGNIFICANT CHANGE UP (ref 0.2–1.2)
BILIRUB SERPL-MCNC: <0.2 MG/DL — SIGNIFICANT CHANGE UP (ref 0.2–1.2)
BILIRUB UR-MCNC: NEGATIVE — SIGNIFICANT CHANGE UP
BUN SERPL-MCNC: 17 MG/DL — SIGNIFICANT CHANGE UP (ref 10–20)
BUN SERPL-MCNC: 17 MG/DL — SIGNIFICANT CHANGE UP (ref 10–20)
BUN SERPL-MCNC: 20 MG/DL — SIGNIFICANT CHANGE UP (ref 10–20)
BUN SERPL-MCNC: 23 MG/DL — HIGH (ref 10–20)
BUN SERPL-MCNC: 23 MG/DL — HIGH (ref 10–20)
BUN SERPL-MCNC: 32 MG/DL — HIGH (ref 10–20)
CALCIUM SERPL-MCNC: 8.5 MG/DL — SIGNIFICANT CHANGE UP (ref 8.4–10.5)
CALCIUM SERPL-MCNC: 8.7 MG/DL — SIGNIFICANT CHANGE UP (ref 8.4–10.5)
CALCIUM SERPL-MCNC: 8.7 MG/DL — SIGNIFICANT CHANGE UP (ref 8.4–10.5)
CALCIUM SERPL-MCNC: 9 MG/DL — SIGNIFICANT CHANGE UP (ref 8.4–10.5)
CALCIUM SERPL-MCNC: 9.1 MG/DL — SIGNIFICANT CHANGE UP (ref 8.4–10.5)
CALCIUM SERPL-MCNC: 9.2 MG/DL — SIGNIFICANT CHANGE UP (ref 8.4–10.5)
CHLORIDE SERPL-SCNC: 103 MMOL/L — SIGNIFICANT CHANGE UP (ref 98–110)
CHLORIDE SERPL-SCNC: 105 MMOL/L — SIGNIFICANT CHANGE UP (ref 98–110)
CHLORIDE SERPL-SCNC: 106 MMOL/L — SIGNIFICANT CHANGE UP (ref 98–110)
CHLORIDE SERPL-SCNC: 107 MMOL/L — SIGNIFICANT CHANGE UP (ref 98–110)
CO2 SERPL-SCNC: 21 MMOL/L — SIGNIFICANT CHANGE UP (ref 17–32)
CO2 SERPL-SCNC: 21 MMOL/L — SIGNIFICANT CHANGE UP (ref 17–32)
CO2 SERPL-SCNC: 23 MMOL/L — SIGNIFICANT CHANGE UP (ref 17–32)
CO2 SERPL-SCNC: 23 MMOL/L — SIGNIFICANT CHANGE UP (ref 17–32)
CO2 SERPL-SCNC: 24 MMOL/L — SIGNIFICANT CHANGE UP (ref 17–32)
CO2 SERPL-SCNC: 24 MMOL/L — SIGNIFICANT CHANGE UP (ref 17–32)
COLOR SPEC: YELLOW — SIGNIFICANT CHANGE UP
CREAT SERPL-MCNC: 0.7 MG/DL — SIGNIFICANT CHANGE UP (ref 0.7–1.5)
CREAT SERPL-MCNC: 0.8 MG/DL — SIGNIFICANT CHANGE UP (ref 0.7–1.5)
CREAT SERPL-MCNC: 0.9 MG/DL — SIGNIFICANT CHANGE UP (ref 0.7–1.5)
CREAT SERPL-MCNC: 0.9 MG/DL — SIGNIFICANT CHANGE UP (ref 0.7–1.5)
CRP SERPL-MCNC: 88.5 MG/L — HIGH
CULTURE RESULTS: SIGNIFICANT CHANGE UP
CULTURE RESULTS: SIGNIFICANT CHANGE UP
DIFF PNL FLD: ABNORMAL
EGFR: 79 ML/MIN/1.73M2 — SIGNIFICANT CHANGE UP
EGFR: 82 ML/MIN/1.73M2 — SIGNIFICANT CHANGE UP
EGFR: 85 ML/MIN/1.73M2 — SIGNIFICANT CHANGE UP
EGFR: 85 ML/MIN/1.73M2 — SIGNIFICANT CHANGE UP
EOSINOPHIL # BLD AUTO: 0.01 K/UL — SIGNIFICANT CHANGE UP (ref 0–0.7)
EOSINOPHIL # BLD AUTO: 0.04 K/UL — SIGNIFICANT CHANGE UP (ref 0–0.7)
EOSINOPHIL # BLD AUTO: 0.1 K/UL — SIGNIFICANT CHANGE UP (ref 0–0.7)
EOSINOPHIL # BLD AUTO: 0.14 K/UL — SIGNIFICANT CHANGE UP (ref 0–0.7)
EOSINOPHIL NFR BLD AUTO: 0.1 % — SIGNIFICANT CHANGE UP (ref 0–8)
EOSINOPHIL NFR BLD AUTO: 0.4 % — SIGNIFICANT CHANGE UP (ref 0–8)
EOSINOPHIL NFR BLD AUTO: 1.2 % — SIGNIFICANT CHANGE UP (ref 0–8)
EOSINOPHIL NFR BLD AUTO: 1.4 % — SIGNIFICANT CHANGE UP (ref 0–8)
ERYTHROCYTE [SEDIMENTATION RATE] IN BLOOD: 127 MM/HR — HIGH (ref 0–15)
ESTIMATED AVERAGE GLUCOSE: 163 MG/DL — HIGH (ref 68–114)
FERRITIN SERPL-MCNC: 109 NG/ML — SIGNIFICANT CHANGE UP (ref 30–400)
FLUAV AG NPH QL: SIGNIFICANT CHANGE UP
FLUBV AG NPH QL: SIGNIFICANT CHANGE UP
FOLATE SERPL-MCNC: 10.7 NG/ML — SIGNIFICANT CHANGE UP
GLUCOSE SERPL-MCNC: 162 MG/DL — HIGH (ref 70–99)
GLUCOSE SERPL-MCNC: 191 MG/DL — HIGH (ref 70–99)
GLUCOSE SERPL-MCNC: 205 MG/DL — HIGH (ref 70–99)
GLUCOSE SERPL-MCNC: 227 MG/DL — HIGH (ref 70–99)
GLUCOSE SERPL-MCNC: 230 MG/DL — HIGH (ref 70–99)
GLUCOSE SERPL-MCNC: 247 MG/DL — HIGH (ref 70–99)
GLUCOSE UR QL: 500 MG/DL
HCT VFR BLD CALC: 28.6 % — LOW (ref 42–52)
HCT VFR BLD CALC: 29.4 % — LOW (ref 42–52)
HCT VFR BLD CALC: 29.5 % — LOW (ref 42–52)
HCT VFR BLD CALC: 30.5 % — LOW (ref 42–52)
HCT VFR BLD CALC: 30.5 % — LOW (ref 42–52)
HCT VFR BLD CALC: 30.6 % — LOW (ref 42–52)
HGB BLD-MCNC: 9.1 G/DL — LOW (ref 14–18)
HGB BLD-MCNC: 9.4 G/DL — LOW (ref 14–18)
HGB BLD-MCNC: 9.5 G/DL — LOW (ref 14–18)
HGB BLD-MCNC: 9.6 G/DL — LOW (ref 14–18)
HGB BLD-MCNC: 9.7 G/DL — LOW (ref 14–18)
HGB BLD-MCNC: 9.9 G/DL — LOW (ref 14–18)
IMM GRANULOCYTES NFR BLD AUTO: 0.9 % — HIGH (ref 0.1–0.3)
IMM GRANULOCYTES NFR BLD AUTO: 1 % — HIGH (ref 0.1–0.3)
IMM GRANULOCYTES NFR BLD AUTO: 1.1 % — HIGH (ref 0.1–0.3)
IMM GRANULOCYTES NFR BLD AUTO: 1.4 % — HIGH (ref 0.1–0.3)
INR BLD: 1.17 RATIO — SIGNIFICANT CHANGE UP (ref 0.65–1.3)
IRON SATN MFR SERPL: 11 % — LOW (ref 15–50)
IRON SATN MFR SERPL: 20 UG/DL — LOW (ref 35–150)
KETONES UR QL: NEGATIVE MG/DL — SIGNIFICANT CHANGE UP
LEUKOCYTE ESTERASE UR-ACNC: ABNORMAL
LIDOCAIN IGE QN: 50 U/L — SIGNIFICANT CHANGE UP (ref 7–60)
LYMPHOCYTES # BLD AUTO: 1.07 K/UL — LOW (ref 1.2–3.4)
LYMPHOCYTES # BLD AUTO: 1.1 K/UL — LOW (ref 1.2–3.4)
LYMPHOCYTES # BLD AUTO: 1.1 K/UL — LOW (ref 1.2–3.4)
LYMPHOCYTES # BLD AUTO: 1.31 K/UL — SIGNIFICANT CHANGE UP (ref 1.2–3.4)
LYMPHOCYTES # BLD AUTO: 10 % — LOW (ref 20.5–51.1)
LYMPHOCYTES # BLD AUTO: 13.1 % — LOW (ref 20.5–51.1)
LYMPHOCYTES # BLD AUTO: 13.5 % — LOW (ref 20.5–51.1)
LYMPHOCYTES # BLD AUTO: 8.9 % — LOW (ref 20.5–51.1)
MAGNESIUM SERPL-MCNC: 1.8 MG/DL — SIGNIFICANT CHANGE UP (ref 1.8–2.4)
MAGNESIUM SERPL-MCNC: 1.9 MG/DL — SIGNIFICANT CHANGE UP (ref 1.8–2.4)
MAGNESIUM SERPL-MCNC: 2 MG/DL — SIGNIFICANT CHANGE UP (ref 1.8–2.4)
MAGNESIUM SERPL-MCNC: 2 MG/DL — SIGNIFICANT CHANGE UP (ref 1.8–2.4)
MCHC RBC-ENTMCNC: 29.4 PG — SIGNIFICANT CHANGE UP (ref 27–31)
MCHC RBC-ENTMCNC: 29.6 PG — SIGNIFICANT CHANGE UP (ref 27–31)
MCHC RBC-ENTMCNC: 29.6 PG — SIGNIFICANT CHANGE UP (ref 27–31)
MCHC RBC-ENTMCNC: 30.1 PG — SIGNIFICANT CHANGE UP (ref 27–31)
MCHC RBC-ENTMCNC: 30.2 PG — SIGNIFICANT CHANGE UP (ref 27–31)
MCHC RBC-ENTMCNC: 30.6 PG — SIGNIFICANT CHANGE UP (ref 27–31)
MCHC RBC-ENTMCNC: 31.4 G/DL — LOW (ref 32–37)
MCHC RBC-ENTMCNC: 31.8 G/DL — LOW (ref 32–37)
MCHC RBC-ENTMCNC: 31.8 G/DL — LOW (ref 32–37)
MCHC RBC-ENTMCNC: 32 G/DL — SIGNIFICANT CHANGE UP (ref 32–37)
MCHC RBC-ENTMCNC: 32.2 G/DL — SIGNIFICANT CHANGE UP (ref 32–37)
MCHC RBC-ENTMCNC: 32.5 G/DL — SIGNIFICANT CHANGE UP (ref 32–37)
MCV RBC AUTO: 93 FL — SIGNIFICANT CHANGE UP (ref 80–94)
MCV RBC AUTO: 93.2 FL — SIGNIFICANT CHANGE UP (ref 80–94)
MCV RBC AUTO: 93.7 FL — SIGNIFICANT CHANGE UP (ref 80–94)
MCV RBC AUTO: 93.9 FL — SIGNIFICANT CHANGE UP (ref 80–94)
MCV RBC AUTO: 94.1 FL — HIGH (ref 80–94)
MCV RBC AUTO: 94.2 FL — HIGH (ref 80–94)
MONOCYTES # BLD AUTO: 0.62 K/UL — HIGH (ref 0.1–0.6)
MONOCYTES # BLD AUTO: 0.68 K/UL — HIGH (ref 0.1–0.6)
MONOCYTES # BLD AUTO: 0.69 K/UL — HIGH (ref 0.1–0.6)
MONOCYTES # BLD AUTO: 0.89 K/UL — HIGH (ref 0.1–0.6)
MONOCYTES NFR BLD AUTO: 5.7 % — SIGNIFICANT CHANGE UP (ref 1.7–9.3)
MONOCYTES NFR BLD AUTO: 6.3 % — SIGNIFICANT CHANGE UP (ref 1.7–9.3)
MONOCYTES NFR BLD AUTO: 7.4 % — SIGNIFICANT CHANGE UP (ref 1.7–9.3)
MONOCYTES NFR BLD AUTO: 9.2 % — SIGNIFICANT CHANGE UP (ref 1.7–9.3)
MRSA PCR RESULT.: NEGATIVE — SIGNIFICANT CHANGE UP
NEUTROPHILS # BLD AUTO: 10.12 K/UL — HIGH (ref 1.4–6.5)
NEUTROPHILS # BLD AUTO: 6.46 K/UL — SIGNIFICANT CHANGE UP (ref 1.4–6.5)
NEUTROPHILS # BLD AUTO: 7.22 K/UL — HIGH (ref 1.4–6.5)
NEUTROPHILS # BLD AUTO: 9.02 K/UL — HIGH (ref 1.4–6.5)
NEUTROPHILS NFR BLD AUTO: 74.3 % — SIGNIFICANT CHANGE UP (ref 42.2–75.2)
NEUTROPHILS NFR BLD AUTO: 77.1 % — HIGH (ref 42.2–75.2)
NEUTROPHILS NFR BLD AUTO: 82.3 % — HIGH (ref 42.2–75.2)
NEUTROPHILS NFR BLD AUTO: 84.2 % — HIGH (ref 42.2–75.2)
NITRITE UR-MCNC: NEGATIVE — SIGNIFICANT CHANGE UP
NRBC BLD AUTO-RTO: 0 /100 WBCS — SIGNIFICANT CHANGE UP (ref 0–0)
NT-PROBNP SERPL-SCNC: 2042 PG/ML — HIGH (ref 0–300)
PH UR: 5.5 — SIGNIFICANT CHANGE UP (ref 5–8)
PLATELET # BLD AUTO: 149 K/UL — SIGNIFICANT CHANGE UP (ref 130–400)
PLATELET # BLD AUTO: 150 K/UL — SIGNIFICANT CHANGE UP (ref 130–400)
PLATELET # BLD AUTO: 151 K/UL — SIGNIFICANT CHANGE UP (ref 130–400)
PLATELET # BLD AUTO: 152 K/UL — SIGNIFICANT CHANGE UP (ref 130–400)
PLATELET # BLD AUTO: 156 K/UL — SIGNIFICANT CHANGE UP (ref 130–400)
PLATELET # BLD AUTO: 166 K/UL — SIGNIFICANT CHANGE UP (ref 130–400)
PMV BLD: 10.4 FL — SIGNIFICANT CHANGE UP (ref 7.4–10.4)
PMV BLD: 10.4 FL — SIGNIFICANT CHANGE UP (ref 7.4–10.4)
PMV BLD: 10.5 FL — HIGH (ref 7.4–10.4)
PMV BLD: 10.6 FL — HIGH (ref 7.4–10.4)
PMV BLD: 11 FL — HIGH (ref 7.4–10.4)
PMV BLD: 11.1 FL — HIGH (ref 7.4–10.4)
POTASSIUM SERPL-MCNC: 3.7 MMOL/L — SIGNIFICANT CHANGE UP (ref 3.5–5)
POTASSIUM SERPL-MCNC: 3.9 MMOL/L — SIGNIFICANT CHANGE UP (ref 3.5–5)
POTASSIUM SERPL-MCNC: 3.9 MMOL/L — SIGNIFICANT CHANGE UP (ref 3.5–5)
POTASSIUM SERPL-MCNC: 4 MMOL/L — SIGNIFICANT CHANGE UP (ref 3.5–5)
POTASSIUM SERPL-MCNC: 4.3 MMOL/L — SIGNIFICANT CHANGE UP (ref 3.5–5)
POTASSIUM SERPL-MCNC: SIGNIFICANT CHANGE UP MMOL/L (ref 3.5–5)
POTASSIUM SERPL-SCNC: 3.7 MMOL/L — SIGNIFICANT CHANGE UP (ref 3.5–5)
POTASSIUM SERPL-SCNC: 3.9 MMOL/L — SIGNIFICANT CHANGE UP (ref 3.5–5)
POTASSIUM SERPL-SCNC: 3.9 MMOL/L — SIGNIFICANT CHANGE UP (ref 3.5–5)
POTASSIUM SERPL-SCNC: 4 MMOL/L — SIGNIFICANT CHANGE UP (ref 3.5–5)
POTASSIUM SERPL-SCNC: 4.3 MMOL/L — SIGNIFICANT CHANGE UP (ref 3.5–5)
POTASSIUM SERPL-SCNC: SIGNIFICANT CHANGE UP MMOL/L (ref 3.5–5)
PROT SERPL-MCNC: 5.8 G/DL — LOW (ref 6–8)
PROT SERPL-MCNC: 7 G/DL — SIGNIFICANT CHANGE UP (ref 6–8)
PROT UR-MCNC: 100 MG/DL
PROTHROM AB SERPL-ACNC: 13.8 SEC — HIGH (ref 9.95–12.87)
RAPID RVP RESULT: DETECTED
RBC # BLD: 3.07 M/UL — LOW (ref 4.7–6.1)
RBC # BLD: 3.12 M/UL — LOW (ref 4.7–6.1)
RBC # BLD: 3.15 M/UL — LOW (ref 4.7–6.1)
RBC # BLD: 3.24 M/UL — LOW (ref 4.7–6.1)
RBC # BLD: 3.26 M/UL — LOW (ref 4.7–6.1)
RBC # BLD: 3.28 M/UL — LOW (ref 4.7–6.1)
RBC # FLD: 15.6 % — HIGH (ref 11.5–14.5)
RBC # FLD: 15.7 % — HIGH (ref 11.5–14.5)
RBC # FLD: 15.8 % — HIGH (ref 11.5–14.5)
RBC # FLD: 15.9 % — HIGH (ref 11.5–14.5)
RSV RNA NPH QL NAA+NON-PROBE: SIGNIFICANT CHANGE UP
RV+EV RNA SPEC QL NAA+PROBE: DETECTED
SARS-COV-2 RNA SPEC QL NAA+PROBE: SIGNIFICANT CHANGE UP
SARS-COV-2 RNA SPEC QL NAA+PROBE: SIGNIFICANT CHANGE UP
SODIUM SERPL-SCNC: 138 MMOL/L — SIGNIFICANT CHANGE UP (ref 135–146)
SODIUM SERPL-SCNC: 141 MMOL/L — SIGNIFICANT CHANGE UP (ref 135–146)
SODIUM SERPL-SCNC: 141 MMOL/L — SIGNIFICANT CHANGE UP (ref 135–146)
SODIUM SERPL-SCNC: 142 MMOL/L — SIGNIFICANT CHANGE UP (ref 135–146)
SODIUM SERPL-SCNC: 142 MMOL/L — SIGNIFICANT CHANGE UP (ref 135–146)
SODIUM SERPL-SCNC: 144 MMOL/L — SIGNIFICANT CHANGE UP (ref 135–146)
SOURCE RESPIRATORY: SIGNIFICANT CHANGE UP
SP GR SPEC: 1.02 — SIGNIFICANT CHANGE UP (ref 1–1.03)
SPECIMEN SOURCE: SIGNIFICANT CHANGE UP
SPECIMEN SOURCE: SIGNIFICANT CHANGE UP
TIBC SERPL-MCNC: 187 UG/DL — LOW (ref 220–430)
TROPONIN T, HIGH SENSITIVITY RESULT: 63 NG/L — CRITICAL HIGH (ref 6–21)
TROPONIN T, HIGH SENSITIVITY RESULT: 68 NG/L — CRITICAL HIGH (ref 6–21)
TSH SERPL-MCNC: 2.17 UIU/ML — SIGNIFICANT CHANGE UP (ref 0.27–4.2)
UIBC SERPL-MCNC: 167 UG/DL — SIGNIFICANT CHANGE UP (ref 110–370)
UROBILINOGEN FLD QL: 0.2 MG/DL — SIGNIFICANT CHANGE UP (ref 0.2–1)
VIT B12 SERPL-MCNC: 1821 PG/ML — HIGH (ref 232–1245)
WBC # BLD: 10.13 K/UL — SIGNIFICANT CHANGE UP (ref 4.8–10.8)
WBC # BLD: 10.96 K/UL — HIGH (ref 4.8–10.8)
WBC # BLD: 12.01 K/UL — HIGH (ref 4.8–10.8)
WBC # BLD: 8.38 K/UL — SIGNIFICANT CHANGE UP (ref 4.8–10.8)
WBC # BLD: 9.05 K/UL — SIGNIFICANT CHANGE UP (ref 4.8–10.8)
WBC # BLD: 9.72 K/UL — SIGNIFICANT CHANGE UP (ref 4.8–10.8)
WBC # FLD AUTO: 10.13 K/UL — SIGNIFICANT CHANGE UP (ref 4.8–10.8)
WBC # FLD AUTO: 10.96 K/UL — HIGH (ref 4.8–10.8)
WBC # FLD AUTO: 12.01 K/UL — HIGH (ref 4.8–10.8)
WBC # FLD AUTO: 8.38 K/UL — SIGNIFICANT CHANGE UP (ref 4.8–10.8)
WBC # FLD AUTO: 9.05 K/UL — SIGNIFICANT CHANGE UP (ref 4.8–10.8)
WBC # FLD AUTO: 9.72 K/UL — SIGNIFICANT CHANGE UP (ref 4.8–10.8)

## 2025-01-01 PROCEDURE — 84443 ASSAY THYROID STIM HORMONE: CPT

## 2025-01-01 PROCEDURE — 99223 1ST HOSP IP/OBS HIGH 75: CPT

## 2025-01-01 PROCEDURE — 83036 HEMOGLOBIN GLYCOSYLATED A1C: CPT

## 2025-01-01 PROCEDURE — 73630 X-RAY EXAM OF FOOT: CPT | Mod: 26,RT

## 2025-01-01 PROCEDURE — 82746 ASSAY OF FOLIC ACID SERUM: CPT

## 2025-01-01 PROCEDURE — 93925 LOWER EXTREMITY STUDY: CPT | Mod: 26

## 2025-01-01 PROCEDURE — 92526 ORAL FUNCTION THERAPY: CPT | Mod: GN

## 2025-01-01 PROCEDURE — 99233 SBSQ HOSP IP/OBS HIGH 50: CPT

## 2025-01-01 PROCEDURE — 85027 COMPLETE CBC AUTOMATED: CPT

## 2025-01-01 PROCEDURE — 87641 MR-STAPH DNA AMP PROBE: CPT

## 2025-01-01 PROCEDURE — 86850 RBC ANTIBODY SCREEN: CPT

## 2025-01-01 PROCEDURE — 71045 X-RAY EXAM CHEST 1 VIEW: CPT | Mod: 26

## 2025-01-01 PROCEDURE — 83550 IRON BINDING TEST: CPT

## 2025-01-01 PROCEDURE — 74177 CT ABD & PELVIS W/CONTRAST: CPT | Mod: 26

## 2025-01-01 PROCEDURE — 99232 SBSQ HOSP IP/OBS MODERATE 35: CPT

## 2025-01-01 PROCEDURE — 99222 1ST HOSP IP/OBS MODERATE 55: CPT

## 2025-01-01 PROCEDURE — 82728 ASSAY OF FERRITIN: CPT

## 2025-01-01 PROCEDURE — 82607 VITAMIN B-12: CPT

## 2025-01-01 PROCEDURE — 36415 COLL VENOUS BLD VENIPUNCTURE: CPT

## 2025-01-01 PROCEDURE — 99231 SBSQ HOSP IP/OBS SF/LOW 25: CPT

## 2025-01-01 PROCEDURE — 97110 THERAPEUTIC EXERCISES: CPT | Mod: GP

## 2025-01-01 PROCEDURE — 0225U NFCT DS DNA&RNA 21 SARSCOV2: CPT

## 2025-01-01 PROCEDURE — 86140 C-REACTIVE PROTEIN: CPT

## 2025-01-01 PROCEDURE — 85025 COMPLETE CBC W/AUTO DIFF WBC: CPT

## 2025-01-01 PROCEDURE — 80053 COMPREHEN METABOLIC PANEL: CPT

## 2025-01-01 PROCEDURE — 86901 BLOOD TYPING SEROLOGIC RH(D): CPT

## 2025-01-01 PROCEDURE — 0241U: CPT

## 2025-01-01 PROCEDURE — 73630 X-RAY EXAM OF FOOT: CPT | Mod: RT

## 2025-01-01 PROCEDURE — 99285 EMERGENCY DEPT VISIT HI MDM: CPT

## 2025-01-01 PROCEDURE — 85652 RBC SED RATE AUTOMATED: CPT

## 2025-01-01 PROCEDURE — 87040 BLOOD CULTURE FOR BACTERIA: CPT

## 2025-01-01 PROCEDURE — 93925 LOWER EXTREMITY STUDY: CPT

## 2025-01-01 PROCEDURE — 87640 STAPH A DNA AMP PROBE: CPT

## 2025-01-01 PROCEDURE — 99497 ADVNCD CARE PLAN 30 MIN: CPT | Mod: 25

## 2025-01-01 PROCEDURE — 85610 PROTHROMBIN TIME: CPT

## 2025-01-01 PROCEDURE — 99233 SBSQ HOSP IP/OBS HIGH 50: CPT | Mod: 25

## 2025-01-01 PROCEDURE — 85730 THROMBOPLASTIN TIME PARTIAL: CPT

## 2025-01-01 PROCEDURE — 70450 CT HEAD/BRAIN W/O DYE: CPT | Mod: 26

## 2025-01-01 PROCEDURE — 92610 EVALUATE SWALLOWING FUNCTION: CPT | Mod: GN

## 2025-01-01 PROCEDURE — 83735 ASSAY OF MAGNESIUM: CPT

## 2025-01-01 PROCEDURE — 83540 ASSAY OF IRON: CPT

## 2025-01-01 PROCEDURE — 86900 BLOOD TYPING SEROLOGIC ABO: CPT

## 2025-01-01 PROCEDURE — 80048 BASIC METABOLIC PNL TOTAL CA: CPT

## 2025-01-01 RX ORDER — POLYETHYLENE GLYCOL 3350 17 G/17G
17 POWDER, FOR SOLUTION ORAL
Refills: 0 | Status: DISCONTINUED | OUTPATIENT
Start: 2025-01-01 | End: 2025-01-01

## 2025-01-01 RX ORDER — FLUCONAZOLE 150 MG
200 TABLET ORAL DAILY
Refills: 0 | Status: DISCONTINUED | OUTPATIENT
Start: 2025-01-01 | End: 2025-01-01

## 2025-01-01 RX ORDER — BISACODYL 5 MG
10 TABLET, DELAYED RELEASE (ENTERIC COATED) ORAL AT BEDTIME
Refills: 0 | Status: DISCONTINUED | OUTPATIENT
Start: 2025-01-01 | End: 2025-01-01

## 2025-01-01 RX ORDER — METOPROLOL SUCCINATE 50 MG/1
25 TABLET, EXTENDED RELEASE ORAL DAILY
Refills: 0 | Status: DISCONTINUED | OUTPATIENT
Start: 2025-01-01 | End: 2025-01-01

## 2025-01-01 RX ORDER — BISACODYL 5 MG
10 TABLET, DELAYED RELEASE (ENTERIC COATED) ORAL DAILY
Refills: 0 | Status: DISCONTINUED | OUTPATIENT
Start: 2025-01-01 | End: 2025-01-01

## 2025-01-01 RX ORDER — APIXABAN 2.5 MG/1
2.5 TABLET, FILM COATED ORAL EVERY 12 HOURS
Refills: 0 | Status: DISCONTINUED | OUTPATIENT
Start: 2025-01-01 | End: 2025-01-01

## 2025-01-01 RX ORDER — CEFTRIAXONE 500 MG/1
2000 INJECTION, POWDER, FOR SOLUTION INTRAMUSCULAR; INTRAVENOUS EVERY 24 HOURS
Refills: 0 | Status: DISCONTINUED | OUTPATIENT
Start: 2025-01-01 | End: 2025-01-01

## 2025-01-01 RX ORDER — ACETAMINOPHEN 500 MG/5ML
1000 LIQUID (ML) ORAL ONCE
Refills: 0 | Status: COMPLETED | OUTPATIENT
Start: 2025-01-01 | End: 2025-01-01

## 2025-01-01 RX ORDER — DOXYCYCLINE HYCLATE 100 MG
100 TABLET ORAL EVERY 12 HOURS
Refills: 0 | Status: DISCONTINUED | OUTPATIENT
Start: 2025-01-01 | End: 2025-01-01

## 2025-01-01 RX ORDER — METOPROLOL SUCCINATE 50 MG/1
12.5 TABLET, EXTENDED RELEASE ORAL EVERY 12 HOURS
Refills: 0 | Status: DISCONTINUED | OUTPATIENT
Start: 2025-01-01 | End: 2025-01-01

## 2025-01-01 RX ORDER — BISACODYL 5 MG
10 TABLET, DELAYED RELEASE (ENTERIC COATED) ORAL
Refills: 0 | Status: DISCONTINUED | OUTPATIENT
Start: 2025-01-01 | End: 2025-01-01

## 2025-01-01 RX ORDER — DOXYCYCLINE HYCLATE 100 MG
TABLET ORAL
Refills: 0 | Status: DISCONTINUED | OUTPATIENT
Start: 2025-01-01 | End: 2025-01-01

## 2025-01-01 RX ORDER — DOXYCYCLINE HYCLATE 100 MG
2 TABLET ORAL
Qty: 60 | Refills: 0
Start: 2025-01-01 | End: 2025-06-18

## 2025-01-01 RX ORDER — ASPIRIN 325 MG
81 TABLET ORAL DAILY
Refills: 0 | Status: DISCONTINUED | OUTPATIENT
Start: 2025-01-01 | End: 2025-01-01

## 2025-01-01 RX ORDER — SERTRALINE 100 MG/1
1 TABLET, FILM COATED ORAL
Refills: 0 | DISCHARGE

## 2025-01-01 RX ORDER — SODIUM CHLORIDE 9 G/1000ML
250 INJECTION, SOLUTION INTRAVENOUS ONCE
Refills: 0 | Status: COMPLETED | OUTPATIENT
Start: 2025-01-01 | End: 2025-01-01

## 2025-01-01 RX ORDER — SENNA 187 MG
2 TABLET ORAL AT BEDTIME
Refills: 0 | Status: DISCONTINUED | OUTPATIENT
Start: 2025-01-01 | End: 2025-01-01

## 2025-01-01 RX ORDER — BISACODYL 5 MG
10 TABLET, DELAYED RELEASE (ENTERIC COATED) ORAL ONCE
Refills: 0 | Status: DISCONTINUED | OUTPATIENT
Start: 2025-01-01 | End: 2025-01-01

## 2025-01-01 RX ORDER — CEFTRIAXONE 500 MG/1
1000 INJECTION, POWDER, FOR SOLUTION INTRAMUSCULAR; INTRAVENOUS EVERY 24 HOURS
Refills: 0 | Status: DISCONTINUED | OUTPATIENT
Start: 2025-01-01 | End: 2025-01-01

## 2025-01-01 RX ORDER — SODIUM CHLORIDE 9 G/1000ML
1000 INJECTION, SOLUTION INTRAVENOUS
Refills: 0 | Status: DISCONTINUED | OUTPATIENT
Start: 2025-01-01 | End: 2025-01-01

## 2025-01-01 RX ORDER — FLUCONAZOLE 150 MG
1 TABLET ORAL
Qty: 0 | Refills: 0 | DISCHARGE
Start: 2025-01-01

## 2025-01-01 RX ORDER — METOPROLOL SUCCINATE 50 MG/1
2.5 TABLET, EXTENDED RELEASE ORAL ONCE
Refills: 0 | Status: COMPLETED | OUTPATIENT
Start: 2025-01-01 | End: 2025-01-01

## 2025-01-01 RX ORDER — VANCOMYCIN HCL IN 5 % DEXTROSE 1.5G/250ML
1000 PLASTIC BAG, INJECTION (ML) INTRAVENOUS ONCE
Refills: 0 | Status: COMPLETED | OUTPATIENT
Start: 2025-01-01 | End: 2025-01-01

## 2025-01-01 RX ORDER — MINERAL OIL
133 OIL (ML) MISCELLANEOUS ONCE
Refills: 0 | Status: COMPLETED | OUTPATIENT
Start: 2025-01-01 | End: 2025-01-01

## 2025-01-01 RX ORDER — SERTRALINE 100 MG/1
50 TABLET, FILM COATED ORAL DAILY
Refills: 0 | Status: DISCONTINUED | OUTPATIENT
Start: 2025-01-01 | End: 2025-01-01

## 2025-01-01 RX ORDER — RANOLAZINE 1000 MG/1
500 TABLET, FILM COATED, EXTENDED RELEASE ORAL
Refills: 0 | Status: DISCONTINUED | OUTPATIENT
Start: 2025-01-01 | End: 2025-01-01

## 2025-01-01 RX ORDER — ISOSORBIDE MONONITRATE 60 MG/1
60 TABLET, EXTENDED RELEASE ORAL DAILY
Refills: 0 | Status: DISCONTINUED | OUTPATIENT
Start: 2025-01-01 | End: 2025-01-01

## 2025-01-01 RX ORDER — DOXYCYCLINE HYCLATE 100 MG
100 TABLET ORAL ONCE
Refills: 0 | Status: COMPLETED | OUTPATIENT
Start: 2025-01-01 | End: 2025-01-01

## 2025-01-01 RX ORDER — BISACODYL 5 MG
10 TABLET, DELAYED RELEASE (ENTERIC COATED) ORAL ONCE
Refills: 0 | Status: COMPLETED | OUTPATIENT
Start: 2025-01-01 | End: 2025-01-01

## 2025-01-01 RX ORDER — FINASTERIDE 1 MG/1
5 TABLET, FILM COATED ORAL DAILY
Refills: 0 | Status: DISCONTINUED | OUTPATIENT
Start: 2025-01-01 | End: 2025-01-01

## 2025-01-01 RX ORDER — FLUCONAZOLE 150 MG
200 TABLET ORAL ONCE
Refills: 0 | Status: COMPLETED | OUTPATIENT
Start: 2025-01-01 | End: 2025-01-01

## 2025-01-01 RX ORDER — CEFEPIME 2 G/20ML
2000 INJECTION, POWDER, FOR SOLUTION INTRAVENOUS ONCE
Refills: 0 | Status: COMPLETED | OUTPATIENT
Start: 2025-01-01 | End: 2025-01-01

## 2025-01-01 RX ORDER — METOPROLOL SUCCINATE 50 MG/1
0.5 TABLET, EXTENDED RELEASE ORAL
Qty: 30 | Refills: 2
Start: 2025-01-01 | End: 2025-09-01

## 2025-01-01 RX ADMIN — SODIUM CHLORIDE 250 MILLILITER(S): 9 INJECTION, SOLUTION INTRAVENOUS at 19:44

## 2025-01-01 RX ADMIN — Medication 100 MILLIGRAM(S): at 17:07

## 2025-01-01 RX ADMIN — RANOLAZINE 500 MILLIGRAM(S): 1000 TABLET, FILM COATED, EXTENDED RELEASE ORAL at 05:26

## 2025-01-01 RX ADMIN — SERTRALINE 50 MILLIGRAM(S): 100 TABLET, FILM COATED ORAL at 13:29

## 2025-01-01 RX ADMIN — APIXABAN 2.5 MILLIGRAM(S): 2.5 TABLET, FILM COATED ORAL at 17:07

## 2025-01-01 RX ADMIN — APIXABAN 2.5 MILLIGRAM(S): 2.5 TABLET, FILM COATED ORAL at 07:31

## 2025-01-01 RX ADMIN — Medication 2 TABLET(S): at 22:29

## 2025-01-01 RX ADMIN — Medication 10 MILLIGRAM(S): at 21:37

## 2025-01-01 RX ADMIN — CEFTRIAXONE 100 MILLIGRAM(S): 500 INJECTION, POWDER, FOR SOLUTION INTRAMUSCULAR; INTRAVENOUS at 01:16

## 2025-01-01 RX ADMIN — Medication 10 MILLIGRAM(S): at 14:42

## 2025-01-01 RX ADMIN — Medication 2 TABLET(S): at 21:29

## 2025-01-01 RX ADMIN — Medication 5 MILLIGRAM(S): at 12:44

## 2025-01-01 RX ADMIN — CEFTRIAXONE 100 MILLIGRAM(S): 500 INJECTION, POWDER, FOR SOLUTION INTRAMUSCULAR; INTRAVENOUS at 00:05

## 2025-01-01 RX ADMIN — APIXABAN 2.5 MILLIGRAM(S): 2.5 TABLET, FILM COATED ORAL at 05:26

## 2025-01-01 RX ADMIN — Medication 10 MILLIGRAM(S): at 13:30

## 2025-01-01 RX ADMIN — Medication 2 TABLET(S): at 20:51

## 2025-01-01 RX ADMIN — APIXABAN 2.5 MILLIGRAM(S): 2.5 TABLET, FILM COATED ORAL at 05:33

## 2025-01-01 RX ADMIN — Medication 81 MILLIGRAM(S): at 13:28

## 2025-01-01 RX ADMIN — Medication 1 APPLICATION(S): at 13:29

## 2025-01-01 RX ADMIN — APIXABAN 2.5 MILLIGRAM(S): 2.5 TABLET, FILM COATED ORAL at 18:00

## 2025-01-01 RX ADMIN — APIXABAN 2.5 MILLIGRAM(S): 2.5 TABLET, FILM COATED ORAL at 06:24

## 2025-01-01 RX ADMIN — POLYETHYLENE GLYCOL 3350 17 GRAM(S): 17 POWDER, FOR SOLUTION ORAL at 18:00

## 2025-01-01 RX ADMIN — Medication 1 APPLICATION(S): at 12:02

## 2025-01-01 RX ADMIN — Medication 81 MILLIGRAM(S): at 11:32

## 2025-01-01 RX ADMIN — Medication 81 MILLIGRAM(S): at 11:56

## 2025-01-01 RX ADMIN — Medication 133 MILLILITER(S): at 16:33

## 2025-01-01 RX ADMIN — Medication 400 MILLIGRAM(S): at 12:41

## 2025-01-01 RX ADMIN — SERTRALINE 50 MILLIGRAM(S): 100 TABLET, FILM COATED ORAL at 11:56

## 2025-01-01 RX ADMIN — POLYETHYLENE GLYCOL 3350 17 GRAM(S): 17 POWDER, FOR SOLUTION ORAL at 16:29

## 2025-01-01 RX ADMIN — CEFTRIAXONE 100 MILLIGRAM(S): 500 INJECTION, POWDER, FOR SOLUTION INTRAMUSCULAR; INTRAVENOUS at 06:24

## 2025-01-01 RX ADMIN — METOPROLOL SUCCINATE 2.5 MILLIGRAM(S): 50 TABLET, EXTENDED RELEASE ORAL at 07:08

## 2025-01-01 RX ADMIN — Medication 10 MILLIGRAM(S): at 13:27

## 2025-01-01 RX ADMIN — Medication 1000 MILLIGRAM(S): at 02:22

## 2025-01-01 RX ADMIN — Medication 200 MILLIGRAM(S): at 12:44

## 2025-01-01 RX ADMIN — ISOSORBIDE MONONITRATE 60 MILLIGRAM(S): 60 TABLET, EXTENDED RELEASE ORAL at 13:28

## 2025-01-01 RX ADMIN — Medication 1000 MILLIGRAM(S): at 07:00

## 2025-01-01 RX ADMIN — Medication 81 MILLIGRAM(S): at 12:44

## 2025-01-01 RX ADMIN — Medication 1000 MILLILITER(S): at 15:59

## 2025-01-01 RX ADMIN — Medication 100 MILLIGRAM(S): at 05:33

## 2025-01-01 RX ADMIN — Medication 1000 MILLIGRAM(S): at 19:10

## 2025-01-01 RX ADMIN — Medication 10 MILLIGRAM(S): at 10:45

## 2025-01-01 RX ADMIN — Medication 1000 MILLIGRAM(S): at 13:30

## 2025-01-01 RX ADMIN — METOPROLOL SUCCINATE 12.5 MILLIGRAM(S): 50 TABLET, EXTENDED RELEASE ORAL at 05:33

## 2025-01-01 RX ADMIN — CEFTRIAXONE 100 MILLIGRAM(S): 500 INJECTION, POWDER, FOR SOLUTION INTRAMUSCULAR; INTRAVENOUS at 00:40

## 2025-01-01 RX ADMIN — Medication 400 MILLIGRAM(S): at 00:02

## 2025-01-01 RX ADMIN — APIXABAN 2.5 MILLIGRAM(S): 2.5 TABLET, FILM COATED ORAL at 17:34

## 2025-01-01 RX ADMIN — SERTRALINE 50 MILLIGRAM(S): 100 TABLET, FILM COATED ORAL at 11:58

## 2025-01-01 RX ADMIN — Medication 200 MILLIGRAM(S): at 11:56

## 2025-01-01 RX ADMIN — Medication 1 APPLICATION(S): at 12:03

## 2025-01-01 RX ADMIN — FINASTERIDE 5 MILLIGRAM(S): 1 TABLET, FILM COATED ORAL at 13:28

## 2025-01-01 RX ADMIN — Medication 250 MILLIGRAM(S): at 19:05

## 2025-01-01 RX ADMIN — Medication 100 MILLIGRAM(S): at 14:05

## 2025-01-01 RX ADMIN — SERTRALINE 50 MILLIGRAM(S): 100 TABLET, FILM COATED ORAL at 16:28

## 2025-01-01 RX ADMIN — APIXABAN 2.5 MILLIGRAM(S): 2.5 TABLET, FILM COATED ORAL at 16:28

## 2025-01-01 RX ADMIN — SERTRALINE 50 MILLIGRAM(S): 100 TABLET, FILM COATED ORAL at 12:44

## 2025-01-01 RX ADMIN — Medication 5 MILLIGRAM(S): at 12:14

## 2025-01-01 RX ADMIN — Medication 400 MILLIGRAM(S): at 06:15

## 2025-01-01 RX ADMIN — Medication 1 APPLICATION(S): at 12:44

## 2025-01-01 RX ADMIN — Medication 81 MILLIGRAM(S): at 11:58

## 2025-01-01 RX ADMIN — Medication 5 MILLIGRAM(S): at 19:00

## 2025-01-01 RX ADMIN — APIXABAN 2.5 MILLIGRAM(S): 2.5 TABLET, FILM COATED ORAL at 17:44

## 2025-01-01 RX ADMIN — Medication 50 MILLILITER(S): at 15:32

## 2025-01-01 RX ADMIN — SODIUM CHLORIDE 50 MILLILITER(S): 9 INJECTION, SOLUTION INTRAVENOUS at 13:21

## 2025-01-01 RX ADMIN — Medication 100 MILLIGRAM(S): at 17:44

## 2025-01-01 RX ADMIN — CEFTRIAXONE 100 MILLIGRAM(S): 500 INJECTION, POWDER, FOR SOLUTION INTRAMUSCULAR; INTRAVENOUS at 00:11

## 2025-01-01 RX ADMIN — METOPROLOL SUCCINATE 25 MILLIGRAM(S): 50 TABLET, EXTENDED RELEASE ORAL at 13:26

## 2025-01-01 RX ADMIN — Medication 400 MILLIGRAM(S): at 18:43

## 2025-01-01 RX ADMIN — METOPROLOL SUCCINATE 12.5 MILLIGRAM(S): 50 TABLET, EXTENDED RELEASE ORAL at 17:44

## 2025-01-01 RX ADMIN — APIXABAN 2.5 MILLIGRAM(S): 2.5 TABLET, FILM COATED ORAL at 06:17

## 2025-01-01 RX ADMIN — Medication 5 MILLIGRAM(S): at 18:24

## 2025-01-01 RX ADMIN — ISOSORBIDE MONONITRATE 60 MILLIGRAM(S): 60 TABLET, EXTENDED RELEASE ORAL at 16:29

## 2025-01-01 RX ADMIN — APIXABAN 2.5 MILLIGRAM(S): 2.5 TABLET, FILM COATED ORAL at 05:49

## 2025-01-01 RX ADMIN — POLYETHYLENE GLYCOL 3350 17 GRAM(S): 17 POWDER, FOR SOLUTION ORAL at 05:26

## 2025-01-01 RX ADMIN — Medication 200 MILLIGRAM(S): at 12:33

## 2025-01-01 RX ADMIN — SODIUM CHLORIDE 50 MILLILITER(S): 9 INJECTION, SOLUTION INTRAVENOUS at 18:17

## 2025-01-01 RX ADMIN — FINASTERIDE 5 MILLIGRAM(S): 1 TABLET, FILM COATED ORAL at 16:29

## 2025-01-01 RX ADMIN — Medication 100 MILLIGRAM(S): at 06:17

## 2025-01-01 RX ADMIN — Medication 100 MILLIGRAM(S): at 05:49

## 2025-01-01 RX ADMIN — CEFEPIME 100 MILLIGRAM(S): 2 INJECTION, POWDER, FOR SOLUTION INTRAVENOUS at 18:45

## 2025-01-01 RX ADMIN — METOPROLOL SUCCINATE 12.5 MILLIGRAM(S): 50 TABLET, EXTENDED RELEASE ORAL at 06:17

## 2025-01-01 RX ADMIN — RANOLAZINE 500 MILLIGRAM(S): 1000 TABLET, FILM COATED, EXTENDED RELEASE ORAL at 16:29

## 2025-01-01 RX ADMIN — METOPROLOL SUCCINATE 12.5 MILLIGRAM(S): 50 TABLET, EXTENDED RELEASE ORAL at 17:07

## 2025-01-01 RX ADMIN — Medication 133 MILLILITER(S): at 10:15

## 2025-01-07 ENCOUNTER — APPOINTMENT (OUTPATIENT)
Dept: UROLOGY | Facility: CLINIC | Age: 89
End: 2025-01-07
Payer: MEDICARE

## 2025-01-07 DIAGNOSIS — R33.8 OTHER RETENTION OF URINE: ICD-10-CM

## 2025-01-07 PROCEDURE — 51702 INSERT TEMP BLADDER CATH: CPT

## 2025-01-21 RX ORDER — APIXABAN 5 MG/1
5 TABLET, FILM COATED ORAL
Qty: 60 | Refills: 3 | Status: ACTIVE | COMMUNITY
Start: 2025-01-21 | End: 1900-01-01

## 2025-01-22 NOTE — ASSESSMENT
[FreeTextEntry1] : CAD, s/p CABG, patent grafts prior to TAVR.\par HTN low now, possibly contributing to patient's weakness and dizziness.\par S/p TAVR. \par Diastolic dysfunction, appears euvolemic.\par Hyperlipidemia.\par \par \par Plan:\par D/c triamterene, reduce HCTZ to 12.5 mg daily.\par Continue the rest of his BP medications for now.\par BW pending.\par F/u in 3 months.\par \par Eleazar Nguyễn MD\par 
Yes

## 2025-02-11 ENCOUNTER — APPOINTMENT (OUTPATIENT)
Dept: UROLOGY | Facility: CLINIC | Age: 89
End: 2025-02-11

## 2025-02-13 ENCOUNTER — NON-APPOINTMENT (OUTPATIENT)
Age: 89
End: 2025-02-13

## 2025-02-19 ENCOUNTER — APPOINTMENT (OUTPATIENT)
Dept: VASCULAR SURGERY | Facility: CLINIC | Age: 89
End: 2025-02-19

## 2025-03-25 ENCOUNTER — APPOINTMENT (OUTPATIENT)
Dept: UROLOGY | Facility: CLINIC | Age: 89
End: 2025-03-25
Payer: MEDICARE

## 2025-03-25 ENCOUNTER — NON-APPOINTMENT (OUTPATIENT)
Age: 89
End: 2025-03-25

## 2025-03-25 DIAGNOSIS — R33.8 OTHER RETENTION OF URINE: ICD-10-CM

## 2025-03-25 DIAGNOSIS — N40.1 BENIGN PROSTATIC HYPERPLASIA WITH LOWER URINARY TRACT SYMPMS: ICD-10-CM

## 2025-03-25 DIAGNOSIS — N13.8 BENIGN PROSTATIC HYPERPLASIA WITH LOWER URINARY TRACT SYMPMS: ICD-10-CM

## 2025-03-25 PROCEDURE — 51703 INSERT BLADDER CATH COMPLEX: CPT

## 2025-03-27 ENCOUNTER — NON-APPOINTMENT (OUTPATIENT)
Age: 89
End: 2025-03-27

## 2025-03-27 ENCOUNTER — APPOINTMENT (OUTPATIENT)
Dept: CARDIOLOGY | Facility: CLINIC | Age: 89
End: 2025-03-27

## 2025-03-27 PROCEDURE — 93294 REM INTERROG EVL PM/LDLS PM: CPT

## 2025-03-27 PROCEDURE — 93296 REM INTERROG EVL PM/IDS: CPT

## 2025-04-22 ENCOUNTER — APPOINTMENT (OUTPATIENT)
Dept: CARDIOLOGY | Facility: CLINIC | Age: 89
End: 2025-04-22

## 2025-05-23 ENCOUNTER — APPOINTMENT (OUTPATIENT)
Dept: UROLOGY | Facility: CLINIC | Age: 89
End: 2025-05-23

## 2025-05-23 ENCOUNTER — NON-APPOINTMENT (OUTPATIENT)
Age: 89
End: 2025-05-23

## 2025-05-23 PROCEDURE — 51702 INSERT TEMP BLADDER CATH: CPT

## 2025-05-28 LAB — URINE CULTURE <10: ABNORMAL

## 2025-05-30 NOTE — ED PROVIDER NOTE - OBJECTIVE STATEMENT
89-year-old male past medical history of cad/cabg, dvt on elliquis, s/p ppm, htn, hl, indwelling keller presenting to the ED for AMS.  Daughter is at bedside.  State for the past few days patient has been more altered than usual.  Patient has decreased p.o. intake and decreased Keller output.  No nausea, vomiting, fevers, trouble breathing.  Daughters state that patient was recently seen at his urologist office and a urine was sent.  Keller was last changed on Friday.  Daughters would like medical management but state no heroic measures.  They would like to make the patient DNR/DNI at this time.

## 2025-05-30 NOTE — H&P ADULT - ASSESSMENT
89-year-old male past medical history of cad/cabg, dvt on elliquis, s/p ppm, htn, hl, indwelling keller presenting to the ED for AMS.     #AMS  #UTI  - Positive UA on admission ISO chronic keller and decreased output  - Pending blood and urine cultures    Plan:   - Start on empiric ceftriaxone 1g q24 for now, infectious disease consulted  - F/u urine/blood cultures and sensitivities, adjust abx as indicated      #Failure to thrive  - Now DNR/DNI per family wishes  - Patient currently not tolerating PO, speech swallow consulted  - Dietary consulted for recs regarding nutrition      #CAD s/p CABG  #Hx DVT on Eliquis  #Hx pacemaker  - C/w aspirin 81mg qD  - C/w Eliquis BID  - EP consulted for device interrogation ISO elevated troponin    #DM2  - Hold home meds  - Since patient is currently NPO, will hold off insulin standing for now, starting ISS      #Misc  #Code Status: DNR/DNI  #DVT ppx: Eliquis  #GI ppx: Protonix  #Diet: None  #Activity: AAT  #Inpatient Dispo: Telemetry  #Discharge Dispo: Floors      NOTE IN PROGRESS    #Pending: Cultures results, possible switching abx, ID consult, EP consult for device interrogation 89-year-old male past medical history of cad/cabg, dvt on elliquis, s/p ppm, htn, hl, indwelling keller presenting to the ED for AMS.     #AMS  #UTI  - Positive UA on admission ISO chronic keller and decreased output  - No white count  - Pending blood and urine cultures    Plan:   - Start on empiric ceftriaxone 1g q24 for now, infectious disease consulted  - F/u urine/blood cultures and sensitivities, adjust abx as indicated      #Failure to thrive  - Now DNR/DNI per family wishes  - Dietary consulted for recs regarding nutrition  - PT consult, AAT      #Troponinemia (resolving)  #CAD s/p CABG  #Hx DVT on Eliquis  #Hx pacemaker  - Trop 68>63  - On HIE echo in 2022: EF 71%, Grade II diastolic dysfunction  - C/w aspirin 81mg qD  - C/w Eliquis 2.5mg BID (dose per last med rec, please confirm dosing in the AM with family)  - EP consulted for device interrogation ISO elevated troponin      #Hx of HTN  #Hx of HLD  - Patient's BP is currently stable, please confirm med rec in the AM with family. Unable to contact overnight.       #DM2  - Since patient is currently NPO, will hold off insulin standing for now, starting ISS  - Please confirm med rec in the AM, unable to reach family tonight      #Misc  #Code Status: DNR/DNI  #DVT ppx: Eliquis  #GI ppx: Protonix  #Diet: None  #Activity: AAT  #Inpatient Dispo: Telemetry  #Discharge Dispo: Floors      #Pending: Cultures results, possible switching abx, ID consult, EP consult for device interrogation, Medication reconciliation 89-year-old male past medical history of cad/cabg, dvt on elliquis, s/p ppm, htn, hl, indwelling keller presenting to the ED for AMS.     #AMS  #UTI  - Positive UA on admission ISO chronic keller and decreased output  - Of note in UA, elevated casts were present  - No white count  - Pending blood and urine cultures    Plan:   - Start on empiric ceftriaxone 1g q24 for now, infectious disease consulted  - F/u urine/blood cultures and sensitivities, adjust abx as indicated  - Ordered urine   - F/u urine studies in the AM, consider nephro consult if abnormal, however renal function is at baseline.       #Failure to thrive  - Now DNR/DNI per family wishes  - Dietary consulted for recs regarding nutrition  - PT consult, AAT      #Troponinemia (resolving)  #CAD s/p CABG  #Hx DVT on Eliquis  #Hx pacemaker  - Trop 68>63  - On HIE echo in 2022: EF 71%, Grade II diastolic dysfunction  - C/w aspirin 81mg qD  - C/w Eliquis 2.5mg BID (dose per last med rec, please confirm dosing in the AM with family)  - EP consulted for device interrogation ISO elevated troponin      #Hx of HTN  #Hx of HLD  - Patient's BP is currently stable, please confirm med rec in the AM with family. Unable to contact overnight.       #DM2  - Since patient is currently NPO, will hold off insulin standing for now, starting ISS  - Please confirm med rec in the AM, unable to reach family tonight      #Misc  #Code Status: DNR/DNI  #DVT ppx: Eliquis  #GI ppx: Protonix  #Diet: None  #Activity: AAT  #Inpatient Dispo: Telemetry  #Discharge Dispo: Home      #Pending: Cultures results, possible switching abx, ID consult, EP consult for device interrogation, Medication reconciliation 89-year-old male past medical history of cad/cabg, dvt on elliquis, s/p ppm, htn, hl, indwelling keller presenting to the ED for AMS.     #AMS  #UTI  - Positive UA on admission ISO chronic keller and decreased output  - Of note in UA, elevated casts were present  - No white count  - CT abdomen pelvis showed no hydronephrosis  - CT head noncon showed no acute processes  - Pending blood and urine cultures  - AMS etiology likely 2/2 infection    Plan:   - Start on empiric ceftriaxone 1g q24 for now, infectious disease consulted  - F/u urine/blood cultures and sensitivities, adjust abx as indicated  - Ordered urine   - F/u urine studies in the AM, consider nephro consult if abnormal, however renal function is at baseline.       #Failure to thrive  - Now DNR/DNI per family wishes  - Dietary consulted for recs regarding nutrition  - PT consult, AAT      #Troponinemia (resolving)  #CAD s/p CABG  #Hx DVT on Eliquis  #Hx pacemaker  - Trop 68>63  - On HIE echo in 2022: EF 71%, Grade II diastolic dysfunction  - C/w aspirin 81mg qD  - C/w Eliquis 2.5mg BID (dose per last med rec, please confirm dosing in the AM with family)  - EP consulted for device interrogation ISO elevated troponin      #Hx of HTN  #Hx of HLD  - Patient's BP is currently stable, please confirm med rec in the AM with family. Unable to contact overnight.       #DM2  - Since patient is currently NPO, will hold off insulin standing for now, starting ISS  - Please confirm med rec in the AM, unable to reach family tonight      #Misc  #Code Status: DNR/DNI  #DVT ppx: Eliquis  #GI ppx: Protonix  #Diet: None  #Activity: AAT  #Inpatient Dispo: Telemetry  #Discharge Dispo: Home      #Pending: Cultures results, possible switching abx, ID consult, EP consult for device interrogation, Medication reconciliation 89-year-old male past medical history of cad/cabg, dvt on elliquis, s/p ppm, htn, hl, indwelling keller presenting to the ED for AMS.     #AMS  #UTI  - Positive UA on admission ISO chronic keller and decreased output  - Of note in UA, elevated casts were present  - No white count  - CT abdomen pelvis showed no hydronephrosis  - CT head noncon showed no acute processes  - Pending blood and urine cultures  - AMS etiology likely 2/2 infection    Plan:   - Start on empiric ceftriaxone 2g q24 for now, infectious disease consulted  - F/u urine/blood cultures and sensitivities, adjust abx as indicated  - Ordered urine   - F/u urine studies in the AM, consider nephro consult if abnormal, however renal function is at baseline.       #Failure to thrive  - Now DNR/DNI per family wishes  - Dietary consulted for recs regarding nutrition  - PT consult, AAT      #Troponinemia (resolving)  #CAD s/p CABG  #Hx DVT on Eliquis  #Hx pacemaker  - Trop 68>63  - On HIE echo in 2022: EF 71%, Grade II diastolic dysfunction  - C/w aspirin 81mg qD  - C/w Eliquis 2.5mg BID (dose per last med rec, please confirm dosing in the AM with family)  - EP consulted for device interrogation ISO elevated troponin      #Hx of HTN  #Hx of HLD  - Patient's BP is currently stable, please confirm med rec in the AM with family. Unable to contact overnight.       #DM2  - Since patient is currently NPO, will hold off insulin standing for now, starting ISS  - Please confirm med rec in the AM, unable to reach family tonight      #Misc  #Code Status: DNR/DNI  #DVT ppx: Eliquis  #GI ppx: Protonix  #Diet: None  #Activity: AAT  #Inpatient Dispo: Telemetry  #Discharge Dispo: Home      #Pending: Cultures results, possible switching abx, ID consult, EP consult for device interrogation, Medication reconciliation

## 2025-05-30 NOTE — H&P ADULT - HISTORY OF PRESENT ILLNESS
89-year-old male past medical history of cad/cabg, dvt on elliquis, s/p ppm, htn, hl, indwelling keller presenting to the ED for AMS.  Daughter is at bedside.  State for the past few days patient has been more altered than usual.  Patient has decreased p.o. intake and decreased Keller output.  No nausea, vomiting, fevers, trouble breathing.  Daughters state that patient was recently seen at his urologist office and a urine was sent.  Keller was last changed on Friday.  Daughters would like medical management but state no heroic measures.  They would like to make the patient DNR/DNI at this time. 89-year-old male past medical history of cad/cabg, dvt on elliquis, s/p ppm, htn, hl, indwelling keller presenting to the ED for AMS.  Daughter is at bedside.  State for the past few days patient has been more altered than usual.  Patient has decreased p.o. intake and decreased Keller output.  No nausea, vomiting, fevers, trouble breathing.  Daughters state that patient was recently seen at his urologist office and a urine was sent.  Keller was last changed on Friday.  Daughters would like medical management but state no heroic measures.  They would like to make the patient DNR/DNI at this time.    Vital Signs Last 24 Hrs  T(C): 36.3 (30 May 2025 23:20), Max: 37.3 (30 May 2025 15:40)  T(F): 97.4 (30 May 2025 23:20), Max: 99.2 (30 May 2025 15:40)  HR: 72 (30 May 2025 23:20) (67 - 75)  BP: 146/67 (30 May 2025 23:20) (126/79 - 179/97)  BP(mean): --  RR: 18 (30 May 2025 23:20) (15 - 20)  SpO2: 100% (30 May 2025 23:20) (96% - 100%)    Parameters below as of 30 May 2025 23:20  Patient On (Oxygen Delivery Method): room air       89-year-old male past medical history of cad/cabg, dvt on elliquis, s/p ppm, htn, hl, indwelling keller presenting to the ED for AMS.  Daughter is at bedside.  State for the past few days patient has been more altered than usual.  Patient has decreased p.o. intake and decreased Keller output.  No nausea, vomiting, fevers, trouble breathing.  Daughters state that patient was recently seen at his urologist office and a urine was sent.  Keller was last changed on Friday.  Daughters would like medical management but state no heroic measures.  They would like to make the patient DNR/DNI at this time.    Vital Signs Last 24 Hrs  T(C): 36.3 (30 May 2025 23:20), Max: 37.3 (30 May 2025 15:40)  T(F): 97.4 (30 May 2025 23:20), Max: 99.2 (30 May 2025 15:40)  HR: 72 (30 May 2025 23:20) (67 - 75)  BP: 146/67 (30 May 2025 23:20) (126/79 - 179/97)  BP(mean): --  RR: 18 (30 May 2025 23:20) (15 - 20)  SpO2: 100% (30 May 2025 23:20) (96% - 100%)    Parameters below as of 30 May 2025 23:20  Patient On (Oxygen Delivery Method): room air    UA is grossly positive for bacteria, yeasts, and casts    Hgb 9.9 (baseline 10s in 2024), MCV 94.1    Trop 68>63, BNP 2042    Patient admitted to telemetry for workup of AMS and troponinemia 89-year-old male past medical history of cad/cabg, dvt on elliquis, s/p ppm, htn, hl, indwelling keller presenting to the ED for AMS.  Daughter is at bedside.  State for the past few days patient has been more altered than usual.  Patient has decreased p.o. intake and decreased Keller output.  No nausea, vomiting, fevers, trouble breathing.  Daughters state that patient was recently seen at his urologist office and a urine was sent.  Keller was last changed on Friday.  Daughters would like medical management but state no heroic measures.  They would like to make the patient DNR/DNI at this time.    Vital Signs Last 24 Hrs  T(C): 36.3 (30 May 2025 23:20), Max: 37.3 (30 May 2025 15:40)  T(F): 97.4 (30 May 2025 23:20), Max: 99.2 (30 May 2025 15:40)  HR: 72 (30 May 2025 23:20) (67 - 75)  BP: 146/67 (30 May 2025 23:20) (126/79 - 179/97)  BP(mean): --  RR: 18 (30 May 2025 23:20) (15 - 20)  SpO2: 100% (30 May 2025 23:20) (96% - 100%)    Parameters below as of 30 May 2025 23:20  Patient On (Oxygen Delivery Method): room air    CT abdomen pelvis only positive for stool impaction  CT Head showed no intracranial pathology    UA is grossly positive for bacteria, yeasts, and casts    Hgb 9.9 (baseline 10s in 2024), MCV 94.1    Trop 68>63, BNP 2042    Patient admitted to telemetry for workup of AMS and troponinemia

## 2025-05-30 NOTE — H&P ADULT - NSHPPHYSICALEXAM_GEN_ALL_CORE
GENERAL: NAD, lying in bed comfortably  HEAD:  Atraumatic, normocephalic  EYES: EOMI, PERRL  NECK: Supple, trachea midline, no JVD  HEART: Regular rate and rhythm, no murmurs  LUNGS: Unlabored respirations.  Clear to auscultation bilaterally  ABDOMEN: Soft, nontender, nondistended, +BS  EXTREMITIES: No clubbing, cyanosis, or edema  NERVOUS SYSTEM:  A&Ox0, lethargic, not able to answer 's questions

## 2025-05-30 NOTE — ED ADULT NURSE REASSESSMENT NOTE - NS ED NURSE REASSESS COMMENT FT1
Pt presents to ED with indwelling urinary catheter. POA catheter removed, 14 Fr catheter inserted in ED. Urine specimen sent from new Ritchie.

## 2025-05-30 NOTE — H&P ADULT - ATTENDING COMMENTS
9.9    9.72  )-----------( 166      ( 30 May 2025 16:13 )             30.5     05-30    138  |  103  |  32[H]  ----------------------------<  191[H]  TNP   |  23  |  0.9    Ca    9.2      30 May 2025 16:13    TPro  7.0  /  Alb  3.4[L]  /  TBili  <0.2  /  DBili  x   /  AST  51[H]  /  ALT  21  /  AlkPhos  116[H]  05-30      A/P HPI:  89-year-old male past medical history of cad/cabg, dvt on elliquis, s/p ppm, htn, hl, indwelling keller presenting to the ED for AMS.  Daughter is at bedside.  State for the past few days patient has been more altered than usual.  Patient has decreased p.o. intake and decreased Keller output.  No nausea, vomiting, fevers, trouble breathing.  Daughters state that patient was recently seen at his urologist office and a urine was sent.  Keller was last changed on Friday.  Daughters would like medical management but state no heroic measures.  They would like to make the patient DNR/DNI at this time.    Vital Signs Last 24 Hrs  T(C): 36.3 (30 May 2025 23:20), Max: 37.3 (30 May 2025 15:40)  T(F): 97.4 (30 May 2025 23:20), Max: 99.2 (30 May 2025 15:40)  HR: 72 (30 May 2025 23:20) (67 - 75)  BP: 146/67 (30 May 2025 23:20) (126/79 - 179/97)  BP(mean): --  RR: 18 (30 May 2025 23:20) (15 - 20)  SpO2: 100% (30 May 2025 23:20) (96% - 100%)    Parameters below as of 30 May 2025 23:20  Patient On (Oxygen Delivery Method): room air    CT abdomen pelvis only positive for stool impaction  CT Head showed no intracranial pathology    UA is grossly positive for bacteria, yeasts, and casts    Hgb 9.9 (baseline 10s in 2024), MCV 94.1    Trop 68>63, BNP 2042    Patient admitted to telemetry for workup of AMS and troponinemia (30 May 2025 23:34)  9.9    9.72  )-----------( 166      ( 30 May 2025 16:13 )             30.5     05-30    138  |  103  |  32[H]  ----------------------------<  191[H]  TNP   |  23  |  0.9    Ca    9.2      30 May 2025 16:13    TPro  7.0  /  Alb  3.4[L]  /  TBili  <0.2  /  DBili  x   /  AST  51[H]  /  ALT  21  /  AlkPhos  116[H]  05-30    Physical Exam:  General: WN/WD NAD  Neurology: A&Ox3, nonfocal, follows commands  Eyes: PERRLA/ EOMI  ENT/Neck: Neck supple, trachea midline, No JVD  Respiratory: CTA B/L, No wheezing, rales, rhonchi  CV: Normal rate regular rhythm, S1S2, no murmurs, rubs or gallops  Abdominal: Soft, NT, ND +BS,   Extremities: No edema, + peripheral pulses  Skin: No Rashes, Hematoma, Ecchymosis  Tubes: Keller Cath    A/P  Toxic metabolic encephalopathy 2/2 UTI   UTI   -IV abx   -check UCx and blood Cx  -fall precautions / OOB only w/ assistance     H/o CAD s/p CABG   Troponinemia -improved   H/o PPM   -c/w OP Rx     DM type II  -Lantus / Lispro AC w/ F/s monitoring and ISS PRN     H/o DVT on Eliquis   -c/w Rx at low dose     Failure to thrive   Dehydration ( increased BUN/Cr)  GOC   -DNR /DNI   -IV fluid hydration and repeat BMP  -PT/ Rehab eval   -dietary eval   -swallow eval     PATIENT SEEN by ATTENDING 5/30/25    75 minutes spent on review of labs, imaging studies, old records, obtaining history, personally examining patient, counselling and communicating with patient/ family, entering orders for medications/tests/etc, discussions with other health care providers, documentation in electronic health records, independent interpretation of labs, imaging/procedure results and care coordination. 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  Daughter is at bedside.  State for the past few days patient has been more altered than usual.  Patient has decreased p.o. intake and decreased Keller output.  No nausea, vomiting, fevers, trouble breathing.  Daughters state that patient was recently seen at his urologist office and a urine was sent.  Keller was last changed on Friday.  Daughters would like medical management but state no heroic measures.  They would like to make the patient DNR/DNI at this time.    Vital Signs Last 24 Hrs  T(C): 36.3 (30 May 2025 23:20), Max: 37.3 (30 May 2025 15:40)  T(F): 97.4 (30 May 2025 23:20), Max: 99.2 (30 May 2025 15:40)  HR: 72 (30 May 2025 23:20) (67 - 75)  BP: 146/67 (30 May 2025 23:20) (126/79 - 179/97)  BP(mean): --  RR: 18 (30 May 2025 23:20) (15 - 20)  SpO2: 100% (30 May 2025 23:20) (96% - 100%)    Parameters below as of 30 May 2025 23:20  Patient On (Oxygen Delivery Method): room air    CT abdomen pelvis only positive for stool impaction  CT Head showed no intracranial pathology    UA is grossly positive for bacteria, yeasts, and casts    Hgb 9.9 (baseline 10s in 2024), MCV 94.1    Trop 68>63, BNP 2042    Patient admitted to telemetry for workup of AMS and troponinemia (30 May 2025 23:34)  9.9    9.72  )-----------( 166      ( 30 May 2025 16:13 )             30.5     05-30    138  |  103  |  32[H]  ----------------------------<  191[H]  TNP   |  23  |  0.9    Ca    9.2      30 May 2025 16:13    TPro  7.0  /  Alb  3.4[L]  /  TBili  <0.2  /  DBili  x   /  AST  51[H]  /  ALT  21  /  AlkPhos  116[H]  05-30    Physical Exam: Georgian speaking   General: WN/WD NAD  Neurology: Arousable, nonfocal, does not follows commands/ respond to question  Eyes: PERRLA/ EOMI  ENT/Neck: Neck supple, trachea midline, No JVD  Respiratory: CTA B/L, No wheezing, rales, rhonchi  CV: Normal rate regular rhythm, S1S2, no murmurs, rubs or gallops  Abdominal: Soft, NT, ND +BS,   Extremities: No edema, + peripheral pulses  Skin: No Rashes, Hematoma, Ecchymosis  Tubes: Keller Cath    A/P  Toxic metabolic encephalopathy 2/2 UTI   UTI   -IV abx   -check UCx and blood Cx  -fall precautions / OOB only w/ assistance     H/o CAD s/p CABG   Troponinemia -improved   H/o PPM   -c/w OP Rx     DM type II  -Lantus / Lispro AC w/ F/s monitoring and ISS PRN     H/o DVT on Eliquis   -c/w Rx at low dose     Failure to thrive   Dehydration ( increased BUN/Cr)  GOC   -DNR /DNI   -IV fluid hydration and repeat BMP  -PT/ Rehab eval   -dietary eval   -swallow eval     PATIENT SEEN by ATTENDING 5/30/25    75 minutes spent on review of labs, imaging studies, old records, obtaining history, personally examining patient, counselling and communicating with patient/ family, entering orders for medications/tests/etc, discussions with other health care providers, documentation in electronic health records, independent interpretation of labs, imaging/procedure results and care coordination.

## 2025-05-30 NOTE — ED ADULT NURSE NOTE - CHIEF COMPLAINT QUOTE
BIBA from home, pt w/ lethargy, generalized weakness, decreased PO intake, decreased urine output as per family. Indwelling urethral catheter Fr. #22 POA attached urobag w/ cloudy output. Pt has PPM. EKG done. Triage FH=543. Pt given 500ml 0.9% NaCl IV fluids by EMS PTA via #18 g angiocatheter to the left forearm. Pt has advanced dementia. Family seeking end of life care for pt.

## 2025-05-30 NOTE — ED PROVIDER NOTE - CLINICAL SUMMARY MEDICAL DECISION MAKING FREE TEXT BOX
89-year-old male past medical history of cad/cabg, dvt on elliquis, s/p ppm, htn, hl, indwelling keller presenting to the ED for AMS. admit for sepsis, 2/2 UTI    Independently interpretted by Jurgen Ibanez DO:  XR interpretation: No cardiopulmonary disease,   EKG interpretation: no NEIL, NSR    Appropriate medications for patient's presenting complaints were ordered and effects were reassessed.  Patient's external records were reviewed.     Escalation to admission/observation was considered.  At this time, patient requires inpatient hospitalization .

## 2025-05-30 NOTE — PATIENT PROFILE ADULT - FALL HARM RISK - HARM RISK INTERVENTIONS

## 2025-05-30 NOTE — ED ADULT NURSE NOTE - OBJECTIVE STATEMENT
Pt presents to ED with lethargy, weakness and decreased PO intake. Family states pt has decreased output in [place] with cloudy urine.

## 2025-05-30 NOTE — H&P ADULT - NSHPLABSRESULTS_GEN_ALL_CORE
9.9    9.72  )-----------( 166      ( 30 May 2025 16:13 )             30.5           138  |  103  |  32[H]  ----------------------------<  191[H]  TNP   |  23  |  0.9    Ca    9.2      30 May 2025 16:13    TPro  7.0  /  Alb  3.4[L]  /  TBili  <0.2  /  DBili  x   /  AST  51[H]  /  ALT  21  /  AlkPhos  116[H]                Urinalysis Basic - ( 30 May 2025 17:08 )    Color: Yellow / Appearance: Cloudy / S.019 / pH: x  Gluc: x / Ketone: x  / Bili: Negative / Urobili: 0.2 mg/dL   Blood: x / Protein: 100 mg/dL / Nitrite: Negative   Leuk Esterase: Large / RBC: 29 /HPF /  /HPF   Sq Epi: x / Non Sq Epi: 1 /HPF / Bacteria: Occasional /HPF            Lactate Trend            CAPILLARY BLOOD GLUCOSE      POCT Blood Glucose.: 204 mg/dL (30 May 2025 14:19)

## 2025-05-30 NOTE — ED ADULT NURSE REASSESSMENT NOTE - NS ED NURSE REASSESS COMMENT FT1
Received pt from previous RN. Pt alert & oriented x1, arousable to voice and touch. No s/s of pain or discomfort. Cardiac monitoring in progress. IV Intact and patent. ll safety precautions maintained, care continues.

## 2025-05-30 NOTE — PATIENT PROFILE ADULT - TOBACCO USE
Central Prior Authorization Team   Phone: [phone]    PA Initiation    Medication: traMADol (ULTRAM) 50 MG tablet  Insurance Company: [employer] Phone [phone] Fax [phone]  Pharmacy Filling the Rx: [address] [place] [address]  Filling Pharmacy Phone: [phone]  Filling Pharmacy Fax: [phone]  Start Date: 2/28/2018       Never smoker

## 2025-05-30 NOTE — ED PROVIDER NOTE - CARE PLAN
Principal Discharge DX:	Adult failure to thrive  Secondary Diagnosis:	Elevated troponin level  Secondary Diagnosis:	Acute UTI   1

## 2025-05-30 NOTE — ED ADULT TRIAGE NOTE - CHIEF COMPLAINT QUOTE
BIBA from home, pt w/ lethargy, generalized weakness, decreased PO intake, decreased urine output as per family. Indwelling urethral catheter Fs. #22 POA attached urobag w/ cloudy output. Pt has PPM. EKG done. Triage EE=979. Pt given 500ml 0.9% NaCl IV fluids by EMS PTA via #18 g angiocatheter to the left forearm. Pt has advanced dementia. Family seeking end of life care for pt. BIBA from home, pt w/ lethargy, generalized weakness, decreased PO intake, decreased urine output as per family. Indwelling urethral catheter Fr. #22 POA attached urobag w/ cloudy output. Pt has PPM. EKG done. Triage DH=358. Pt given 500ml 0.9% NaCl IV fluids by EMS PTA via #18 g angiocatheter to the left forearm. Pt has advanced dementia. Family seeking end of life care for pt.

## 2025-05-30 NOTE — ED PROVIDER NOTE - PHYSICAL EXAMINATION
CONSTITUTIONAL: well-appearing, in NAD  SKIN: Warm dry, normal skin turgor  HEAD: NCAT  EYES: EOMI, PERRLA, no scleral icterus, conjunctiva pink  ENT: dry mucous membranes   NECK: Supple; non tender. Full ROM.  CARD: RRR  RESP: clear to ausculation b/l.   ABD: soft, non-tender, non-distended, no rebound or guarding.  EXT:  no bony tenderness, no pedal edema, no calf tenderness  NEURO: unable to assess   PSYCH: unable to assess

## 2025-05-30 NOTE — ED PROVIDER NOTE - PROGRESS NOTE DETAILS
ELANA-- Pt was made DNR/DNI by daughters in discussion with Dr. Rodriguez, pt signed out to me pending CT scans. On discussion with family, daughters report that they understand his health is deteriorating and cant take care of him or feed him at home.  They agree with plan for admission.

## 2025-05-30 NOTE — ED ADULT NURSE NOTE - NSSEPSISNEWALTERMENTAL_ED_A_ED
Yes

## 2025-05-31 NOTE — PROGRESS NOTE ADULT - SUBJECTIVE AND OBJECTIVE BOX
[facility] 89y Male  MRN#: [id number]     SUBJECTIVE  Patient is a 89y old Male who presents with a chief complaint of AMS (31 May 2025 08:28)    Interval/Overnight Events:    Today is hospital day 1d, and this morning he is lying in bed without distress.   No acute overnight events.     OBJECTIVE  PAST MEDICAL & SURGICAL HISTORY  PVD (peripheral vascular disease)    Diabetes    Pacemaker    H/O ischemic heart disease    HTN (hypertension)    Hypercholesteremia    Prostate disorder    S/P CABG (coronary artery bypass graft)    History of cholecystectomy    S/P placement of cardiac pacemaker      ALLERGIES:  No Known Allergies    MEDICATIONS:  STANDING MEDICATIONS  apixaban 2.5 milliGRAM(s) Oral every 12 hours  aspirin  chewable 81 milliGRAM(s) Oral daily  sodium chloride 0.9%. 1000 milliLiter(s) IV Continuous <Continuous>    PRN MEDICATIONS    HOME MEDICATIONS  Home Medications:  aspirin 81 mg oral tablet: orally once a day (31 May 2025 03:51)  Bisacodyl 50 mg  PRN:  (31 May 2025 03:51)  Crestor 5 mg oral tablet: 1 tab(s) orally once a day (31 May 2025 03:51)  Farxiga 10 mg oral tablet: 1 tab(s) orally once a day (31 May 2025 03:51)  finasteride 5 mg oral tablet: 1 tab(s) orally once a day (31 May 2025 03:51)  Imdur 60 mg oral tablet, extended release: 1 tab(s) orally once a day (31 May 2025 03:51)  Janumet 50 mg-1000 mg oral tablet: 1 tab(s) orally 2 times a day (31 May 2025 03:51)  Linzess 290 mcg oral capsule: 1 cap(s) orally once a day (31 May 2025 03:51)  magnesium oxide 400 mg oral capsule: 1 cap(s) orally once a day (31 May 2025 03:51)  Multiple Vitamins oral capsule: 1 cap(s) orally once a day (31 May 2025 03:51)  pioglitazone 30 mg oral tablet: 1 tab(s) orally once a day (31 May 2025 03:51)  propranolol 40 mg oral tablet: 1 tab(s) orally 2 times a day (31 May 2025 03:51)  Ranexa 500 mg oral tablet, extended release: 1 tab(s) orally 2 times a day (31 May 2025 03:51)  sertraline 25 mg oral tablet: 1 tab(s) orally once a day (31 May 2025 03:51)  tamsulosin 0.4 mg oral capsule: 1 cap(s) orally 2 times a day (31 May 2025 03:51)  tamsulosin 0.4 mg oral capsule: 1 cap(s) orally 2 times a day (31 May 2025 03:51)  Vitamin B12 1 tab daily:  (31 May 2025 03:51)      LABS:                        9.9    9.72  )-----------( 166      ( 30 May 2025 16:13 )             30.5         138  |  103  |  32[H]  ----------------------------<  191[H]  TNP   |  23  |  0.9    Ca    9.2      30 May 2025 16:13    TPro  7.0  /  Alb  3.4[L]  /  TBili  <0.2  /  DBili  x   /  AST  51[H]  /  ALT  21  /  AlkPhos  116[H]      LIVER FUNCTIONS - ( 30 May 2025 16:13 )  Alb: 3.4 g/dL / Pro: 7.0 g/dL / ALK PHOS: 116 U/L / ALT: 21 U/L / AST: 51 U/L / GGT: x             Urinalysis Basic - ( 30 May 2025 17:08 )    Color: Yellow / Appearance: Cloudy / S.019 / pH: x  Gluc: x / Ketone: x  / Bili: Negative / Urobili: 0.2 mg/dL   Blood: x / Protein: 100 mg/dL / Nitrite: Negative   Leuk Esterase: Large / RBC: 29 /HPF /  /HPF   Sq Epi: x / Non Sq Epi: 1 /HPF / Bacteria: Occasional /HPF            Urinalysis with Rflx Culture (collected 30 May 2025 17:08)          CAPILLARY BLOOD GLUCOSE      POCT Blood Glucose.: 204 mg/dL (30 May 2025 14:19)      PHYSICAL EXAM:  T(C): 36.2 (25 @ 12:31), Max: 37.3 (25 @ 15:40)  HR: 60 (25 @ 12:31) (60 - 81)  BP: 127/68 (25 @ 12:31) (126/79 - 179/97)  RR: 20 (25 @ 12:31) (18 - 20)  SpO2: 99% (25 @ 04:19) (97% - 100%)    GENERAL: NAD, well-developed, 89y  EENT: EOMI, conjunctiva and sclera clear, No nasal obstruction or discharge  RESPIRATORY: Clear to auscultation bilaterally; No wheeze or crackles  CARDIOVASCULAR: Regular rate and rhythm; No murmurs, rubs, or gallops, no pitting edema  GASTROINTESTINAL: Abdomen Soft, Nontender, Nondistended  MUSCULOSKELETAL:  No cyanosis, extremities grossly symmetrical  PSYCH: AAOx3, affect appropriate  NEUROLOGY: non-focal, cognition grossly intact, MAEE    ADMISSION SUMMARY  Patient is a 89y old Male who presents with a chief complaint of AMS (31 May 2025 08:28)

## 2025-05-31 NOTE — CONSULT NOTE ADULT - ASSESSMENT
ASSESSMENT  89-year-old male past medical history of cad/cabg, AVR, dvt on elliquis, s/p ppm, htn, hl, indwelling keller presenting to the ED for AMS.     IMPRESSION  #    Admission WBC 9 ; Sepsis ruled out on admission   Chronic keller     UA pyuria    CT abdomen pelvis only positive for stool impaction  #PPM / AVR  #Immunodeficiency secondary to Senescence  which could result in poor clinical outcomes  #Abx allergy: No Known Allergies    Creatinine: 0.9 (05-30-25 @ 16:13)    Height (cm): 182.9 (05-30-25 @ 14:20)  Weight (kg): 63.5 (05-30-25 @ 14:20)    RECOMMENDATIONS  This is an incomplete consult note. All final recommendations to follow after interview and examination of the patient. Please follow recommendations noted below.    If any questions, please send a message or call on [employer] Teams  Please continue to update ID with any pertinent new laboratory, radiographic findings, or change in clinical status ASSESSMENT  89-year-old male past medical history of cad/cabg, AVR, dvt on elliquis, s/p ppm, htn, hl, indwelling keller presenting to the ED for AMS.     IMPRESSION  #Rule out complicated cystitis     Admission WBC 9 ; Sepsis ruled out on admission   Chronic keller     UA pyuria    CT abdomen pelvis only positive for stool impaction  #PPM / AVR  #Immunodeficiency secondary to Senescence  which could result in poor clinical outcomes  #Abx allergy: No Known Allergies    Creatinine: 0.9 (05-30-25 @ 16:13)    Height (cm): 182.9 (05-30-25 @ 14:20)  Weight (kg): 63.5 (05-30-25 @ 14:20)    RECOMMENDATIONS  - f/u UCX  - DECREASE to Ceftriaxone 1g q24h IV  - f/u BCX    If any questions, please send a message or call on [employer] Teams  Please continue to update ID with any pertinent new laboratory, radiographic findings, or change in clinical status

## 2025-05-31 NOTE — CONSULT NOTE ADULT - SUBJECTIVE AND OBJECTIVE BOX
JUNO HEART  89y, Male  Allergy: No Known Allergies      CHIEF COMPLAINT:   AMS (30 May 2025 23:34)      LOS  1d    HPI  HPI:  89-year-old male past medical history of cad/cabg, dvt on elliquis, s/p ppm, htn, hl, indwelling keller presenting to the ED for AMS.  Daughter is at bedside.  State for the past few days patient has been more altered than usual.  Patient has decreased p.o. intake and decreased Keller output.  No nausea, vomiting, fevers, trouble breathing.  Daughters state that patient was recently seen at his urologist office and a urine was sent.  Keller was last changed on Friday.  Daughters would like medical management but state no heroic measures.  They would like to make the patient DNR/DNI at this time.    Vital Signs Last 24 Hrs  T(C): 36.3 (30 May 2025 23:20), Max: 37.3 (30 May 2025 15:40)  T(F): 97.4 (30 May 2025 23:20), Max: 99.2 (30 May 2025 15:40)  HR: 72 (30 May 2025 23:20) (67 - 75)  BP: 146/67 (30 May 2025 23:20) (126/79 - 179/97)  BP(mean): --  RR: 18 (30 May 2025 23:20) (15 - 20)  SpO2: 100% (30 May 2025 23:20) (96% - 100%)    Parameters below as of 30 May 2025 23:20  Patient On (Oxygen Delivery Method): room air    CT abdomen pelvis only positive for stool impaction  CT Head showed no intracranial pathology    UA is grossly positive for bacteria, yeasts, and casts    Hgb 9.9 (baseline 10s in ), MCV 94.1    Trop 68>63, BNP 2    Patient admitted to telemetry for workup of AMS and troponinemia (30 May 2025 23:34)      INFECTIOUS DISEASE HISTORY:  ID consulted for +UA, chronic keller     Currently ordered for:  cefTRIAXone   IVPB 2000 milliGRAM(s) IV Intermittent every 24 hours      PMH  PAST MEDICAL & SURGICAL HISTORY:  PVD (peripheral vascular disease)      Diabetes      Pacemaker      H/O ischemic heart disease      HTN (hypertension)      Hypercholesteremia      Prostate disorder      S/P CABG (coronary artery bypass graft)      History of cholecystectomy      S/P placement of cardiac pacemaker          FAMILY HISTORY  FH: CAD (coronary artery disease) (Mother)    Family history of CVA (Mother)        SOCIAL HISTORY  Social History:        ROS  ***    VITALS:  T(F): 97.6, Max: 99.2 (25 @ 15:40)  HR: 81  BP: 137/52  RR: 18Vital Signs Last 24 Hrs  T(C): 36.4 (31 May 2025 04:19), Max: 37.3 (30 May 2025 15:40)  T(F): 97.6 (31 May 2025 04:19), Max: 99.2 (30 May 2025 15:40)  HR: 81 (31 May 2025 04:19) (67 - 81)  BP: 137/52 (31 May 2025 04:19) (126/79 - 179/97)  BP(mean): --  RR: 18 (31 May 2025 04:19) (15 - 20)  SpO2: 99% (31 May 2025 04:19) (96% - 100%)    Parameters below as of 31 May 2025 04:19  Patient On (Oxygen Delivery Method): room air        PHYSICAL EXAM:  ***    TESTS & MEASUREMENTS:                        9.9    9.72  )-----------( 166      ( 30 May 2025 16:13 )             30.5     05-    138  |  103  |  32[H]  ----------------------------<  191[H]  TNP   |  23  |  0.9    Ca    9.2      30 May 2025 16:13    TPro  7.0  /  Alb  3.4[L]  /  TBili  <0.2  /  DBili  x   /  AST  51[H]  /  ALT  21  /  AlkPhos  116[H]  05-30      LIVER FUNCTIONS - ( 30 May 2025 16:13 )  Alb: 3.4 g/dL / Pro: 7.0 g/dL / ALK PHOS: 116 U/L / ALT: 21 U/L / AST: 51 U/L / GGT: x           Urinalysis Basic - ( 30 May 2025 17:08 )    Color: Yellow / Appearance: Cloudy / S.019 / pH: x  Gluc: x / Ketone: x  / Bili: Negative / Urobili: 0.2 mg/dL   Blood: x / Protein: 100 mg/dL / Nitrite: Negative   Leuk Esterase: Large / RBC: 29 /HPF /  /HPF   Sq Epi: x / Non Sq Epi: 1 /HPF / Bacteria: Occasional /HPF        Urinalysis with Rflx Culture (collected 25 @ 17:08)    Urinalysis with Rflx Culture (collected 10-13-24 @ 18:52)            INFECTIOUS DISEASES TESTING      INFLAMMATORY MARKERS      RADIOLOGY & ADDITIONAL TESTS:  I have personally reviewed the last Chest xray  CXR      CT  CT Abdomen and Pelvis w/ IV Cont:   ACC: [id number] EXAM:  CT ABDOMEN AND PELVIS IC   ORDERED BY: DMITRIY CHAO     PROCEDURE DATE:  2025          INTERPRETATION:  CLINICAL INFORMATION: Abdominal pain; decreased p.o.   intake    COMPARISON: None.    CONTRAST/COMPLICATIONS:  IV Contrast: Omnipaque 350  100 cc administered   0 cc discarded  Oral Contrast: NONE    PROCEDURE:  CT of the Abdomen and Pelvis was performed.  Sagittal and coronal reformats were performed.    FINDINGS:  LOWER CHEST: Bibasilar reticulations. Poststernotomy with aortic valve   replacement    LIVER: Within normal limits.  BILE DUCTS: Normal caliber.  GALLBLADDER: Cholecystectomy.  SPLEEN: Within normal limits.  PANCREAS: Stable pancreatic head hypodensity  ADRENALS: Left adrenal gland thickening,stable  KIDNEYS/URETERS: No renal stones or hydronephrosis. Exophytic right renal   cysts and other hypodensities, incompletely characterized    BLADDER: Keller catheter within decompressed urinary bladder.  REPRODUCTIVE ORGANS: Unremarkable at CT.    BOWEL: Stool impaction the rectum measuring up to 7.7 cm. No bowel   obstruction. Appendix is normal.  PERITONEUM/RETROPERITONEUM: Within normal limits.  VESSELS: Atherosclerotic changes of the abdominal aorta.  LYMPH NODES: No lymphadenopathy.  ABDOMINAL WALL: Within normal limits.  BONES: Osteopenia. Multilevel degenerative changes with disc osteophyte   complexes    IMPRESSION:  Stool impaction of the rectum.    Otherwise, no acute inflammatory pathology within the abdomen or pelvis    --- Endof Report ---            NELDA FERNANDEZ MD; Attending Radiologist  This document has been electronically signed. May 30 2025  9:02PM (25 @ 19:02)      CARDIOLOGY TESTING  12 Lead ECG:   Ventricular Rate 71 BPM    Atrial Rate 63 BPM    P-R Interval 224 ms <TRUNCATED> (25 @ 16:00)       MEDICATIONS  apixaban 2.5 Oral every 12 hours  aspirin  chewable 81 Oral daily  cefTRIAXone   IVPB 2000 IV Intermittent every 24 hours      ANTIBIOTICS:  cefTRIAXone   IVPB 2000 milliGRAM(s) IV Intermittent every 24 hours      ALLERGIES:  No Known Allergies           JUNO HEART  89y, Male  Allergy: No Known Allergies      CHIEF COMPLAINT:   AMS (30 May 2025 23:34)      LOS  1d    HPI  HPI:  89-year-old male past medical history of cad/cabg, dvt on elliquis, s/p ppm, htn, hl, indwelling keller presenting to the ED for AMS.  Daughter is at bedside.  State for the past few days patient has been more altered than usual.  Patient has decreased p.o. intake and decreased Keller output.  No nausea, vomiting, fevers, trouble breathing.  Daughters state that patient was recently seen at his urologist office and a urine was sent.  Keller was last changed on Friday.  Daughters would like medical management but state no heroic measures.  They would like to make the patient DNR/DNI at this time.    Vital Signs Last 24 Hrs  T(C): 36.3 (30 May 2025 23:20), Max: 37.3 (30 May 2025 15:40)  T(F): 97.4 (30 May 2025 23:20), Max: 99.2 (30 May 2025 15:40)  HR: 72 (30 May 2025 23:20) (67 - 75)  BP: 146/67 (30 May 2025 23:20) (126/79 - 179/97)  BP(mean): --  RR: 18 (30 May 2025 23:20) (15 - 20)  SpO2: 100% (30 May 2025 23:20) (96% - 100%)    Parameters below as of 30 May 2025 23:20  Patient On (Oxygen Delivery Method): room air    CT abdomen pelvis only positive for stool impaction  CT Head showed no intracranial pathology    UA is grossly positive for bacteria, yeasts, and casts    Hgb 9.9 (baseline 10s in ), MCV 94.1    Trop 68>63, BNP 2    Patient admitted to telemetry for workup of AMS and troponinemia (30 May 2025 23:34)      INFECTIOUS DISEASE HISTORY:  ID consulted for +UA, chronic keller   daughter at bedside providing collateral history   patient has had worsening mental status over the past few weeks, previously was speaking, could have some conversation, was eating    Currently ordered for:  cefTRIAXone   IVPB 2000 milliGRAM(s) IV Intermittent every 24 hours      PMH  PAST MEDICAL & SURGICAL HISTORY:  PVD (peripheral vascular disease)      Diabetes      Pacemaker      H/O ischemic heart disease      HTN (hypertension)      Hypercholesteremia      Prostate disorder      S/P CABG (coronary artery bypass graft)      History of cholecystectomy      S/P placement of cardiac pacemaker          FAMILY HISTORY  FH: CAD (coronary artery disease) (Mother)    Family history of CVA (Mother)        SOCIAL HISTORY  Social History:        ROS  unable to obtain history secondary to patient's mental status and/or sedation     VITALS:  T(F): 97.6, Max: 99.2 (- @ 15:40)  HR: 81  BP: 137/52  RR: 18Vital Signs Last 24 Hrs  T(C): 36.4 (31 May 2025 04:19), Max: 37.3 (30 May 2025 15:40)  T(F): 97.6 (31 May 2025 04:19), Max: 99.2 (30 May 2025 15:40)  HR: 81 (31 May 2025 04:19) (67 - 81)  BP: 137/52 (31 May 2025 04:19) (126/79 - 179/97)  BP(mean): --  RR: 18 (31 May 2025 04:19) (15 - 20)  SpO2: 99% (31 May 2025 04:19) (96% - 100%)    Parameters below as of 31 May 2025 04:19  Patient On (Oxygen Delivery Method): room air        PHYSICAL EXAM:  Gen: chronically ill appearing   HEENT: Normocephalic, atraumatic  Neck: supple, no lymphadenopathy  CV: Regular rate & regular rhythm  Lungs: decreased BS at bases, no fremitus  Abdomen: Soft, BS present  Ext: Warm, well perfused  Neuro: non focal, not following commands  Skin: no rash, no erythema  Lines: no phlebitis     TESTS & MEASUREMENTS:                        9.9    9.72  )-----------( 166      ( 30 May 2025 16:13 )             30.5     05-    138  |  103  |  32[H]  ----------------------------<  191[H]  TNP   |  23  |  0.9    Ca    9.2      30 May 2025 16:13    TPro  7.0  /  Alb  3.4[L]  /  TBili  <0.2  /  DBili  x   /  AST  51[H]  /  ALT  21  /  AlkPhos  116[H]  05-      LIVER FUNCTIONS - ( 30 May 2025 16:13 )  Alb: 3.4 g/dL / Pro: 7.0 g/dL / ALK PHOS: 116 U/L / ALT: 21 U/L / AST: 51 U/L / GGT: x           Urinalysis Basic - ( 30 May 2025 17:08 )    Color: Yellow / Appearance: Cloudy / S.019 / pH: x  Gluc: x / Ketone: x  / Bili: Negative / Urobili: 0.2 mg/dL   Blood: x / Protein: 100 mg/dL / Nitrite: Negative   Leuk Esterase: Large / RBC: 29 /HPF /  /HPF   Sq Epi: x / Non Sq Epi: 1 /HPF / Bacteria: Occasional /HPF        Urinalysis with Rflx Culture (collected 25 @ 17:08)    Urinalysis with Rflx Culture (collected 10-13-24 @ 18:52)            INFECTIOUS DISEASES TESTING      INFLAMMATORY MARKERS      RADIOLOGY & ADDITIONAL TESTS:  I have personally reviewed the last Chest xray  CXR      CT  CT Abdomen and Pelvis w/ IV Cont:   ACC: [id number] EXAM:  CT ABDOMEN AND PELVIS IC   ORDERED BY: DMITRIY CHAO     PROCEDURE DATE:  2025          INTERPRETATION:  CLINICAL INFORMATION: Abdominal pain; decreased p.o.   intake    COMPARISON: None.    CONTRAST/COMPLICATIONS:  IV Contrast: Omnipaque 350  100 cc administered   0 cc discarded  Oral Contrast: NONE    PROCEDURE:  CT of the Abdomen and Pelvis was performed.  Sagittal and coronal reformats were performed.    FINDINGS:  LOWER CHEST: Bibasilar reticulations. Poststernotomy with aortic valve   replacement    LIVER: Within normal limits.  BILE DUCTS: Normal caliber.  GALLBLADDER: Cholecystectomy.  SPLEEN: Within normal limits.  PANCREAS: Stable pancreatic head hypodensity  ADRENALS: Left adrenal gland thickening,stable  KIDNEYS/URETERS: No renal stones or hydronephrosis. Exophytic right renal   cysts and other hypodensities, incompletely characterized    BLADDER: Keller catheter within decompressed urinary bladder.  REPRODUCTIVE ORGANS: Unremarkable at CT.    BOWEL: Stool impaction the rectum measuring up to 7.7 cm. No bowel   obstruction. Appendix is normal.  PERITONEUM/RETROPERITONEUM: Within normal limits.  VESSELS: Atherosclerotic changes of the abdominal aorta.  LYMPH NODES: No lymphadenopathy.  ABDOMINAL WALL: Within normal limits.  BONES: Osteopenia. Multilevel degenerative changes with disc osteophyte   complexes    IMPRESSION:  Stool impaction of the rectum.    Otherwise, no acute inflammatory pathology within the abdomen or pelvis    --- Endof Report ---            NELDA FERNANDEZ MD; Attending Radiologist  This document has been electronically signed. May 30 2025  9:02PM (25 @ 19:02)      CARDIOLOGY TESTING  12 Lead ECG:   Ventricular Rate 71 BPM    Atrial Rate 63 BPM    P-R Interval 224 ms <TRUNCATED> (25 @ 16:00)       MEDICATIONS  apixaban 2.5 Oral every 12 hours  aspirin  chewable 81 Oral daily  cefTRIAXone   IVPB 2000 IV Intermittent every 24 hours      ANTIBIOTICS:  cefTRIAXone   IVPB 2000 milliGRAM(s) IV Intermittent every 24 hours      ALLERGIES:  No Known Allergies

## 2025-05-31 NOTE — PROGRESS NOTE ADULT - ASSESSMENT
89-year-old male past medical history of cad/cabg, dvt on elliquis, s/p ppm, htn, hl, indwelling keller presenting to the ED for AMS.  Daughter is at bedside.  State for the past few days patient has been more altered than usual.  Patient has decreased p.o. intake and decreased Keller output.  No nausea, vomiting, fevers, trouble breathing.  Daughters state that patient was recently seen at his urologist office and a urine was sent.  Keller was last changed on Friday.  Daughters would like medical management but state no heroic measures.  They would like to make the patient DNR/DNI at this time    UA is grossly positive  Toxic metabolic encephalopathy 2/2 UTI   Rule out complicated cystitis, Sepsis ruled out on admission   Chronic keller  UTI, UA positive for bacteria, yeasts, and casts  - CT abdomen pelvis only positive for stool impaction  - CT Head showed no intracranial pathology  - abx per ID, on Rocephin   - check UCx and blood Cx  - fall precautions / OOB only w/ assistance     H/o CAD s/p CABG   Troponinemia -improved   H/o PPM   - c/w OP Rx     DM type II  - Lantus / Lispro AC w/ F/s monitoring and ISS PRN     H/o DVT on Eliquis   - c/w Rx at low dose     Podiatry eval for recent OP podiatry procedure    Failure to thrive   Dehydration ( increased BUN/Cr)  - DNR /DNI, palliative eval  - IV fluid hydration, for 24 hours  - PT/ Rehab eval, AAT  - dietary eval   - swallow eval

## 2025-05-31 NOTE — PROGRESS NOTE ADULT - ASSESSMENT
89-year-old male past medical history of cad/cabg, dvt on elliquis, s/p ppm, htn, hl, indwelling keller presenting to the ED for AMS.     #AMS/TME 2/2 UTI  - per daughter Toña, pt has been having cognitive decline with behavioral changes (loss of appetite and activity) over the past several months  - chronic keller changes last Friday 5/23 at urology office  - positive UA on admission  - CT A/P: stool impaction, no hydro  - CTH: no acute findings  - f/u blood and urine cultures  - ID following   - c/w Rocephin 1g q24hrs      #Failure to thrive  - Now DNR/DNI per family wishes  - Dietary consulted for recs regarding nutrition  - PT consult, recommending SNF  - palliative eval regarding daughter Toña interest in discussing hospice and next steps with patient's overall decline in the past few months      #Troponinemia (resolving)  #CAD s/p CABG  #Hx DVT on Eliquis  #Hx pacemaker  - Trop 68>63  - On HIE echo in 2022: EF 71%, Grade II diastolic dysfunction  - C/w aspirin 81mg qD  - C/w Eliquis 2.5mg BID (dose per last med rec, please confirm dosing in the AM with family)  - per EP, can follow up outpatient for routine monitoring of PPM      #Hx of HTN  #Hx of HLD  - c/w home meds      #DM2  - monitor FS      DVT ppx: Eliquis  DNR/DNI    Pending: f/u blood/urine cultures 89-year-old male past medical history of cad/cabg, dvt on elliquis, s/p ppm, htn, hl, indwelling keller presenting to the ED for AMS.     #AMS/TME 2/2 UTI  - per daughter Toña, pt has been having cognitive decline with behavioral changes (loss of appetite and activity) over the past several months  - chronic keller changes last Friday 5/23 at urology office  - positive UA on admission  - CT A/P: stool impaction, no hydro  - CTH: no acute findings  - f/u blood and urine cultures  - ID following   - c/w Rocephin 1g q24hrs    #constipation  - CT A/P: stool impaction noted  - miralax and senna qhs    #Failure to thrive  - Now DNR/DNI per family wishes  - Dietary consulted for recs regarding nutrition  - PT consult, recommending SNF  - palliative eval regarding nabila Ding interest in discussing hospice and next steps with patient's overall decline in the past few months      #Troponinemia (resolving)  #CAD s/p CABG  #Hx DVT on Eliquis  #Hx pacemaker  - Trop 68>63  - On HIE echo in 2022: EF 71%, Grade II diastolic dysfunction  - C/w aspirin 81mg qD  - C/w Eliquis 2.5mg BID (dose per last med rec, please confirm dosing in the AM with family)  - per EP, can follow up outpatient for routine monitoring of PPM      #Hx of HTN  #Hx of HLD  - c/w home meds      #DM2  - monitor FS      DVT ppx: Eliquis  DNR/DNI    Pending: f/u blood/urine cultures

## 2025-05-31 NOTE — CHART NOTE - NSCHARTNOTEFT_GEN_A_CORE
Consult received and chart reviewed; palliative care will formally evaluate patient Monday AM, please call [phone] in the interim for any acute needs. Thank you.   ______________  Kristopher Milligan MD  Palliative Medicine  [facility]  [phone]

## 2025-05-31 NOTE — PROGRESS NOTE ADULT - SUBJECTIVE AND OBJECTIVE BOX
24H events:    Patient is a 89y old Male who presents with a chief complaint of AMS (31 May 2025 08:28)    Primary diagnosis of Adult failure to thrive    Today is 1d of hospitalization. This morning patient was seen and examined at bedside    PAST MEDICAL & SURGICAL HISTORY  PVD (peripheral vascular disease)    Diabetes    Pacemaker    H/O ischemic heart disease    HTN (hypertension)    Hypercholesteremia    Prostate disorder    S/P CABG (coronary artery bypass graft)    History of cholecystectomy    S/P placement of cardiac pacemaker      SOCIAL HISTORY:  Social History:      ALLERGIES:  No Known Allergies    MEDICATIONS:  STANDING MEDICATIONS  apixaban 2.5 milliGRAM(s) Oral every 12 hours  aspirin  chewable 81 milliGRAM(s) Oral daily  sodium chloride 0.9%. 1000 milliLiter(s) IV Continuous <Continuous>    PRN MEDICATIONS    VITALS:   T(F): 97.1  HR: 60  BP: 127/68  RR: 20  SpO2: 99%    PHYSICAL EXAM:  General: WN/WD NAD  Neurology: Arousable, nonfocal, does not follows commands/ respond to question  Eyes: PERRLA/ EOMI  ENT/Neck: Neck supple, trachea midline, No JVD  Respiratory: CTA B/L, No wheezing, rales, rhonchi  CV: Normal rate regular rhythm, S1S2, no murmurs, rubs or gallops  Abdominal: Soft, NT, ND +BS,   Extremities: No edema, + peripheral pulses  Skin: No Rashes, Hematoma, Ecchymosis  Tubes: Ritchie Cath    LABS:                        9.9    9.72  )-----------( 166      ( 30 May 2025 16:13 )             30.5     05-30    138  |  103  |  32[H]  ----------------------------<  191[H]  TNP   |  23  |  0.9    Ca    9.2      30 May 2025 16:13    TPro  7.0  /  Alb  3.4[L]  /  TBili  <0.2  /  DBili  x   /  AST  51[H]  /  ALT  21  /  AlkPhos  116[H]  05-30      Urinalysis Basic - ( 30 May 2025 17:08 )    Color: Yellow / Appearance: Cloudy / S.019 / pH: x  Gluc: x / Ketone: x  / Bili: Negative / Urobili: 0.2 mg/dL   Blood: x / Protein: 100 mg/dL / Nitrite: Negative   Leuk Esterase: Large / RBC: 29 /HPF /  /HPF   Sq Epi: x / Non Sq Epi: 1 /HPF / Bacteria: Occasional /HPF            Urinalysis with Rflx Culture (collected 30 May 2025 17:08)

## 2025-06-01 NOTE — DIETITIAN INITIAL EVALUATION ADULT - PERTINENT LABORATORY DATA
06-01    142  |  106  |  17  ----------------------------<  227[H]  3.9   |  21  |  0.8    Ca    8.5      01 Jun 2025 11:49  Mg     1.9     06-01    TPro  5.8[L]  /  Alb  3.3[L]  /  TBili  0.2  /  DBili  x   /  AST  26  /  ALT  21  /  AlkPhos  100  06-01  A1C with Estimated Average Glucose Result: 7.1 % (10-10-24 @ 00:00)

## 2025-06-01 NOTE — DIETITIAN INITIAL EVALUATION ADULT - ORAL NUTRITION SUPPLEMENTS
1.Recommend provide Glucerna Shake BID (220kcal, 10gm protein, 8oz per carton)   2.Recommend provide Ensure Max BID (150kcal, 30gm protein, 11oz per carton) 1.Recommend provide Glucerna Shake once daily (220kcal, 10gm protein, 8oz per carton)   2.Recommend provide Ensure Max once daily (150kcal, 30gm protein, 11oz per carton)

## 2025-06-01 NOTE — DIETITIAN INITIAL EVALUATION ADULT - NSFNSNUTRCHEWSWALLOWFT_GEN_A_CORE
S/p swallow evaluation (6/1), recs include provide a pureed diet with thin liquids, which was taken.

## 2025-06-01 NOTE — SWALLOW BEDSIDE ASSESSMENT ADULT - NS SPL SWALLOW CLINIC TRIAL FT
moderate to severe oral impairment no overt symptoms of laryngeal penetration/aspiration for puree foods and thin liquids via small sips

## 2025-06-01 NOTE — DIETITIAN INITIAL EVALUATION ADULT - NUTRITIONGOAL OUTCOME1
Chronic pain or other acute medical condition Consume/tolerate 25%-50% of meals and oral supplements in 4 days (High Risk)

## 2025-06-01 NOTE — DIETITIAN INITIAL EVALUATION ADULT - COLLABORATION WITH OTHER PROVIDERS
I've contacted the patient's physician name Dr. Park via Teams regarding my nutritional recommendations.

## 2025-06-01 NOTE — DIETITIAN NUTRITION RISK NOTIFICATION - TREATMENT: THE FOLLOWING DIET HAS BEEN RECOMMENDED
Diet, Pureed:   Supplement Feeding Modality:  Oral  Glucerna Shake Cans or Servings Per Day:  1       Frequency:  Daily  Ensure Max Cans or Servings Per Day:  1       Frequency:  Daily (06-01-25 @ 21:40) [Pending Verification By Attending]  Diet, Pureed (06-01-25 @ 10:30) [Active]

## 2025-06-01 NOTE — PROGRESS NOTE ADULT - ASSESSMENT
89-year-old male past medical history of cad/cabg, dvt on elliquis, s/p ppm, htn, hl, indwelling keller presenting to the ED for AMS.     #AMS/TME 2/2 UTI  - per daughter Toña, pt has been having cognitive decline with behavioral changes (loss of appetite and activity) over the past several months  - chronic keller changes last Friday 5/23 at urology office  - positive UA on admission  - CT A/P: stool impaction, no hydro  - CTH: no acute findings  - f/u blood and urine cultures  - ID following   - c/w Rocephin 1g q24hrs    #constipation  - CT A/P: stool impaction noted  - miralax and senna qhs    #Failure to thrive  - Now DNR/DNI per family wishes  - Dietary consulted for recs regarding nutrition  - PT consult, recommending SNF  - palliative eval regarding nabila Ding interest in discussing hospice and next steps with patient's overall decline in the past few months      #Troponinemia (resolving)  #CAD s/p CABG  #Hx DVT on Eliquis  #Hx pacemaker  - Trop 68>63  - On HIE echo in 2022: EF 71%, Grade II diastolic dysfunction  - C/w aspirin 81mg qD  - C/w Eliquis 2.5mg BID (dose per last med rec, please confirm dosing in the AM with family)  - per EP, can follow up outpatient for routine monitoring of PPM      #Hx of HTN  #Hx of HLD  - c/w home meds      #DM2  - monitor FS      DVT ppx: Eliquis  DNR/DNI    Pending: f/u blood/urine cultures, palliative f/u 89-year-old male past medical history of cad/cabg, dvt on elliquis, s/p ppm, htn, hl, indwelling keller presenting to the ED for AMS.     #AMS/TME 2/2 UTI  - per daughter Toña, pt has been having cognitive decline with behavioral changes (loss of appetite and activity) over the past several months  - chronic keller changes last Friday 5/23 at urology office  - positive UA on admission  - CT A/P: stool impaction, no hydro  - CTH: no acute findings  - f/u blood and urine cultures  - ID following   - c/w Rocephin 1g q24hrs    #R foot infx s/p amputation  - podiatry eval noted  - check ESR/CRP  - check LE arterial doppler  - f/u R foot xray    #constipation  - CT A/P: stool impaction noted  - miralax and senna qhs    #Failure to thrive  - Now DNR/DNI per family wishes  - Dietary consulted for recs regarding nutrition  - PT consult, recommending SNF  - palliative eval regarding nabila Ding interest in discussing hospice and next steps with patient's overall decline in the past few months      #Troponinemia (resolving)  #CAD s/p CABG  #Hx DVT on Eliquis  #Hx of HTN/HLD  #Hx pacemaker  - Trop 68>63  - On HIE echo in 2022: EF 71%, Grade II diastolic dysfunction  - C/w aspirin 81mg qD  - C/w Eliquis 2.5mg BID   - C/w Imdur, Ranexa  - per EP, can follow up outpatient for routine monitoring of PPM      #DM2  - monitor FS    #BPH  - c/w finasteride    #Anxiety/Depression  - c/w Zoloft    DVT ppx: Eliquis  DNR/DNI    Pending: f/u blood/urine cultures, palliative f/u

## 2025-06-01 NOTE — DIETITIAN INITIAL EVALUATION ADULT - ORAL INTAKE PTA/DIET HISTORY
An interview with the patient could not be completed at this time since they are asleep. I've spoken to the patient's daughter, who is present. Per daughter, prior to admission, the patient received a pureed diet secondary to chewing and swallowing difficulty due to poor dentition and does not have dentures; consumed three meals at <50% x3 months; consumed vanilla and chocolate Glucerna shake once daily; NKFA. Their usual body weight was 150#(68kg) but experienced a 10# unintentional weight loss x3 months secondary to inadequate PO intake.

## 2025-06-01 NOTE — DIETITIAN INITIAL EVALUATION ADULT - ADD RECOMMEND
1.Recommend provide an appetite stimulant   2.Recommend provide a MVI with minerals once daily   3.Recommend provide Vitamin C (500mg) once daily   4.Recommend provide an Iron supplement daily

## 2025-06-01 NOTE — CONSULT NOTE ADULT - SUBJECTIVE AND OBJECTIVE BOX
Podiatry Consult Note    Subjective:  JUNO [facility]  Seen Bedside 89y Male  .   Patient is a 89y old  Male who presents with a chief complaint of AMS (31 May 2025 15:04)    HPI:  89-year-old male past medical history of cad/cabg, dvt on elliquis, s/p ppm, htn, hl, indwelling keller presenting to the ED for AMS.  Daughter is at bedside.  State for the past few days patient has been more altered than usual.  Patient has decreased p.o. intake and decreased Keller output.  No nausea, vomiting, fevers, trouble breathing.  Daughters state that patient was recently seen at his urologist office and a urine was sent.  Keller was last changed on Friday.  Daughters would like medical management but state no heroic measures.  They would like to make the patient DNR/DNI at this time.    Vital Signs Last 24 Hrs  T(C): 36.3 (30 May 2025 23:20), Max: 37.3 (30 May 2025 15:40)  T(F): 97.4 (30 May 2025 23:20), Max: 99.2 (30 May 2025 15:40)  HR: 72 (30 May 2025 23:20) (67 - 75)  BP: 146/67 (30 May 2025 23:20) (126/79 - 179/97)  BP(mean): --  RR: 18 (30 May 2025 23:20) (15 - 20)  SpO2: 100% (30 May 2025 23:20) (96% - 100%)    Parameters below as of 30 May 2025 23:20  Patient On (Oxygen Delivery Method): room air    CT abdomen pelvis only positive for stool impaction  CT Head showed no intracranial pathology    UA is grossly positive for bacteria, yeasts, and casts    Hgb 9.9 (baseline 10s in 2024), MCV 94.1    Trop 68>63, BNP 2042    Patient admitted to telemetry for workup of AMS and troponinemia (30 May 2025 23:34)      Past Medical History and Surgical History  PAST MEDICAL & SURGICAL HISTORY:  PVD (peripheral vascular disease)      Diabetes      Pacemaker      H/O ischemic heart disease      HTN (hypertension)      Hypercholesteremia      Prostate disorder      S/P CABG (coronary artery bypass graft)      History of cholecystectomy      S/P placement of cardiac pacemaker           Review of Systems:  [X] Ten point review of systems is otherwise negative except as noted     Objective:  Vital Signs Last 24 Hrs  T(C): 36.7 (01 Jun 2025 04:21), Max: 36.7 (01 Jun 2025 04:21)  T(F): 98 (01 Jun 2025 04:21), Max: 98 (01 Jun 2025 04:21)  HR: 64 (01 Jun 2025 04:21) (60 - 80)  BP: 173/71 (01 Jun 2025 04:21) (127/68 - 173/71)  BP(mean): 105 (01 Jun 2025 04:21) (105 - 105)  RR: 20 (01 Jun 2025 04:21) (20 - 20)  SpO2: 98% (01 Jun 2025 04:21) (98% - 98%)    Parameters below as of 01 Jun 2025 04:21  Patient On (Oxygen Delivery Method): room air                            9.4    8.38  )-----------( 151      ( 01 Jun 2025 04:30 )             29.4                 06-01    141  |  106  |  17  ----------------------------<  162[H]  4.0   |  21  |  0.8    Ca    8.7      01 Jun 2025 04:30  Mg     1.9     06-01    TPro  5.8[L]  /  Alb  3.3[L]  /  TBili  0.2  /  DBili  x   /  AST  26  /  ALT  21  /  AlkPhos  100  06-01        Physical Exam - Lower Extremity Focused:   Derm:   Right Foot - open amputation site with skin necrosis and eschar cap, dehiscence noted to superficial surgical site with localized erythema to digit  - Wound probed to bone with visible 1st metatarsal head in wound base, no undermining or tunneling noted, surrounding soft tissue unlikely to remain viable, wound base fibrotic  - No purulence or drainage or malodor noted on examination  - Tender to palpation  - Dry stable eschars to lateral ray for Right foot   - Heel - no pressure injury at this time, notable soft tissue hyperemia for prolonged pressure/contact     Left Foot - No open wounds at this time,    - Heel - no pressure injury at this time, notable soft tissue hyperemia for prolonged pressure/contact     Vascular: DP and PT Pulses Diminished; Foot is Warm to the touch; Capillary Refill Time < 3 Seconds;    Neuro: Protective Sensation Diminished / Moderately Neuropathic   MSK: Pain On Palpation at Wound Site     Assessment:  Right Forefoot Plantar Ulcer (4th Submetatarsal head) - Probes Deep to Bone  Cyanosis Right 4th Digit Extending to MTP Joint  Cellulitic Right Lower Extremity     Plan:  Chart reviewed and Patient evaluated. All Questions and Concerns Addressed and Answered  XR Imaging Foot; Pending Final Results  Local Wound Care; Applied -  Xeroform / Gauze / Kerlix / secured with Tape   - Cont LWC at this time daily q24H  Weight Bearing Status; WBAT   - Continued offloading of heels at all times to reduce possibility of further pressure injury   Recommend; Lower Extremity Arterial Duplex B/L  Please order ESR, CRP, A1C, Coags, Lactate  Appreciate ID consult for IV ABX recommendations   Discussions with Emergency contact Khadijah, as well as daughter about possible Sx intervention  - At this time family states that unless wound becomes a medical emergency, they do not wish to have any Sx intervention   - Pt admitted for failure to thrive at this time  Recommend PT consultation for contracted Lower Extremity B/L and assistance with increasing ROM   Discussed Plan w/ Dr. Robertson, DPM    Podiatry   Please message on teams for any further questions  Podiatry Consult Note    Subjective:  JUNO [facility]  Seen Bedside 89y Male  .   Patient is a 89y old  Male who presents with a chief complaint of AMS (31 May 2025 15:04)    HPI:  89-year-old male past medical history of cad/cabg, dvt on elliquis, s/p ppm, htn, hl, indwelling keller presenting to the ED for AMS.  Daughter is at bedside.  State for the past few days patient has been more altered than usual.  Patient has decreased p.o. intake and decreased Keller output.  No nausea, vomiting, fevers, trouble breathing.  Daughters state that patient was recently seen at his urologist office and a urine was sent.  Keller was last changed on Friday.  Daughters would like medical management but state no heroic measures.  They would like to make the patient DNR/DNI at this time.    Vital Signs Last 24 Hrs  T(C): 36.3 (30 May 2025 23:20), Max: 37.3 (30 May 2025 15:40)  T(F): 97.4 (30 May 2025 23:20), Max: 99.2 (30 May 2025 15:40)  HR: 72 (30 May 2025 23:20) (67 - 75)  BP: 146/67 (30 May 2025 23:20) (126/79 - 179/97)  BP(mean): --  RR: 18 (30 May 2025 23:20) (15 - 20)  SpO2: 100% (30 May 2025 23:20) (96% - 100%)    Parameters below as of 30 May 2025 23:20  Patient On (Oxygen Delivery Method): room air    CT abdomen pelvis only positive for stool impaction  CT Head showed no intracranial pathology    UA is grossly positive for bacteria, yeasts, and casts    Hgb 9.9 (baseline 10s in 2024), MCV 94.1    Trop 68>63, BNP 2042    Patient admitted to telemetry for workup of AMS and troponinemia (30 May 2025 23:34)      Past Medical History and Surgical History  PAST MEDICAL & SURGICAL HISTORY:  PVD (peripheral vascular disease)      Diabetes      Pacemaker      H/O ischemic heart disease      HTN (hypertension)      Hypercholesteremia      Prostate disorder      S/P CABG (coronary artery bypass graft)      History of cholecystectomy      S/P placement of cardiac pacemaker           Review of Systems:  [X] Ten point review of systems is otherwise negative except as noted     Objective:  Vital Signs Last 24 Hrs  T(C): 36.7 (01 Jun 2025 04:21), Max: 36.7 (01 Jun 2025 04:21)  T(F): 98 (01 Jun 2025 04:21), Max: 98 (01 Jun 2025 04:21)  HR: 64 (01 Jun 2025 04:21) (60 - 80)  BP: 173/71 (01 Jun 2025 04:21) (127/68 - 173/71)  BP(mean): 105 (01 Jun 2025 04:21) (105 - 105)  RR: 20 (01 Jun 2025 04:21) (20 - 20)  SpO2: 98% (01 Jun 2025 04:21) (98% - 98%)    Parameters below as of 01 Jun 2025 04:21  Patient On (Oxygen Delivery Method): room air                            9.4    8.38  )-----------( 151      ( 01 Jun 2025 04:30 )             29.4                 06-01    141  |  106  |  17  ----------------------------<  162[H]  4.0   |  21  |  0.8    Ca    8.7      01 Jun 2025 04:30  Mg     1.9     06-01    TPro  5.8[L]  /  Alb  3.3[L]  /  TBili  0.2  /  DBili  x   /  AST  26  /  ALT  21  /  AlkPhos  100  06-01        Physical Exam - Lower Extremity Focused:   Derm:   Right Foot - open amputation site with skin necrosis and eschar cap, dehiscence noted to superficial surgical site with localized erythema to digit  - Wound probed to bone with visible 1st metatarsal head in wound base, no undermining or tunneling noted, surrounding soft tissue unlikely to remain viable, wound base fibrotic  - No purulence or drainage or malodor noted on examination  - Tender to palpation  - Dry stable eschars to lateral ray for Right foot   - Heel - no pressure injury at this time, notable soft tissue hyperemia for prolonged pressure/contact     Left Foot - No open wounds at this time,    - Heel - no pressure injury at this time, notable soft tissue hyperemia for prolonged pressure/contact     Vascular: DP and PT Pulses Diminished; Foot is Warm to the touch; Capillary Refill Time < 3 Seconds;    Neuro: Protective Sensation Diminished / Moderately Neuropathic   MSK: Pain On Palpation at Wound Site     Assessment:  Right Foot Amputated Hallux, Dehisced       Plan:  Chart reviewed and Patient evaluated. All Questions and Concerns Addressed and Answered  XR Imaging Foot; Pending Final Results  Local Wound Care; Applied -  Xeroform / Gauze / Kerlix / secured with Tape   - Cont LWC at this time daily q24H  Weight Bearing Status; WBAT   - Continued offloading of heels at all times to reduce possibility of further pressure injury   Recommend; Lower Extremity Arterial Duplex B/L  Please order ESR, CRP, A1C, Coags, Lactate  Appreciate ID consult for IV ABX recommendations   Discussions with Emergency contact Khadijah, as well as daughter about possible Sx intervention  - At this time family states that unless wound becomes a medical emergency, they do not wish to have any Sx intervention    - If wound worsens or condition worsens secondary to foot, further Sx discussions to be had with family  - Pt admitted for failure to thrive at this time  Recommend PT consultation for contracted Lower Extremity B/L and assistance with increasing ROM   Discussed Plan w/ Dr. Robertson, DPM    Podiatry   Please message on teams for any further questions  Podiatry Consult Note    Subjective:  [facility]UN[facility]  Seen Bedside 89y Male  .   Patient is a 89y old  Male who presents with a chief complaint of AMS (31 May 2025 15:04)    HPI:  89-year-old male past medical history of cad/cabg, dvt on elliquis, s/p ppm, htn, hl, indwelling keller presenting to the ED for AMS.  Daughter is at bedside.  State for the past few days patient has been more altered than usual.  Patient has decreased p.o. intake and decreased Keller output.  No nausea, vomiting, fevers, trouble breathing.  Daughters state that patient was recently seen at his urologist office and a urine was sent.  Keller was last changed on Friday.  Daughters would like medical management but state no heroic measures.  They would like to make the patient DNR/DNI at this time.    Vital Signs Last 24 Hrs  T(C): 36.3 (30 May 2025 23:20), Max: 37.3 (30 May 2025 15:40)  T(F): 97.4 (30 May 2025 23:20), Max: 99.2 (30 May 2025 15:40)  HR: 72 (30 May 2025 23:20) (67 - 75)  BP: 146/67 (30 May 2025 23:20) (126/79 - 179/97)  BP(mean): --  RR: 18 (30 May 2025 23:20) (15 - 20)  SpO2: 100% (30 May 2025 23:20) (96% - 100%)    Parameters below as of 30 May 2025 23:20  Patient On (Oxygen Delivery Method): room air    CT abdomen pelvis only positive for stool impaction  CT Head showed no intracranial pathology    UA is grossly positive for bacteria, yeasts, and casts    Hgb 9.9 (baseline 10s in 2024), MCV 94.1    Trop 68>63, BNP 2042    Patient admitted to telemetry for workup of AMS and troponinemia (30 May 2025 23:34)      Past Medical History and Surgical History  PAST MEDICAL & SURGICAL HISTORY:  PVD (peripheral vascular disease)      Diabetes      Pacemaker      H/O ischemic heart disease      HTN (hypertension)      Hypercholesteremia      Prostate disorder      S/P CABG (coronary artery bypass graft)      History of cholecystectomy      S/P placement of cardiac pacemaker           Review of Systems:  [X] Ten point review of systems is otherwise negative except as noted     Objective:  Vital Signs Last 24 Hrs  T(C): 36.7 (01 Jun 2025 04:21), Max: 36.7 (01 Jun 2025 04:21)  T(F): 98 (01 Jun 2025 04:21), Max: 98 (01 Jun 2025 04:21)  HR: 64 (01 Jun 2025 04:21) (60 - 80)  BP: 173/71 (01 Jun 2025 04:21) (127/68 - 173/71)  BP(mean): 105 (01 Jun 2025 04:21) (105 - 105)  RR: 20 (01 Jun 2025 04:21) (20 - 20)  SpO2: 98% (01 Jun 2025 04:21) (98% - 98%)    Parameters below as of 01 Jun 2025 04:21  Patient On (Oxygen Delivery Method): room air                            9.4    8.38  )-----------( 151      ( 01 Jun 2025 04:30 )             29.4                 06-01    141  |  106  |  17  ----------------------------<  162[H]  4.0   |  21  |  0.8    Ca    8.7      01 Jun 2025 04:30  Mg     1.9     06-01    TPro  5.8[L]  /  Alb  3.3[L]  /  TBili  0.2  /  DBili  x   /  AST  26  /  ALT  21  /  AlkPhos  100  06-01        Physical Exam - Lower Extremity Focused:   Derm:   Right Foot - open amputation site with skin necrosis and eschar cap, dehiscence noted to superficial surgical site with localized erythema to digit  - Wound probed to bone with visible 1st metatarsal head in wound base, no undermining or tunneling noted, surrounding soft tissue unlikely to remain viable, wound base fibrotic  - No purulence or drainage or malodor noted on examination  - Tender to palpation  - Dry stable eschars to lateral ray for Right foot   - Heel - no pressure injury at this time, notable soft tissue hyperemia for prolonged pressure/contact     Left Foot - No open wounds at this time,    - Heel pressure injury at this time, notable soft tissue hyperemia for prolonged pressure/contact with area of skin necrosis  - Unstageable at this time     Vascular: DP and PT Pulses Diminished; Foot is Warm to the touch; Capillary Refill Time < 3 Seconds;    Neuro: Protective Sensation Diminished / Moderately Neuropathic   MSK: Pain On Palpation at Wound Site     Assessment:  Right Foot Amputated Hallux, Dehisced       Plan:  Chart reviewed and Patient evaluated. All Questions and Concerns Addressed and Answered  XR Imaging Foot; Pending Final Results  Local Wound Care; Applied -  Xeroform / Gauze / Kerlix / secured with Tape   - Cont LWC at this time daily q24H  Weight Bearing Status; WBAT   - Continued offloading of heels at all times to reduce possibility of further pressure injury   Recommend; Lower Extremity Arterial Duplex B/L  Please order ESR, CRP, A1C, Coags, Lactate  Appreciate ID consult for IV ABX recommendations   Discussions with Emergency contact Khadijah, as well as daughter about possible Sx intervention  - At this time family states that unless wound becomes a medical emergency, they do not wish to have any Sx intervention    - If wound worsens or condition worsens secondary to foot, further Sx discussions to be had with family  - Pt admitted for failure to thrive at this time  Recommend PT consultation for contracted Lower Extremity B/L and assistance with increasing ROM   Discussed Plan w/ Dr. Robertson, DPM    Podiatry   Please message on teams for any further questions

## 2025-06-01 NOTE — DIETITIAN INITIAL EVALUATION ADULT - NAME AND PHONE
Intervention: 1.Meals and Snacks 2.Medical Food Supplement 3.Nutrition Related Medication 4.Vitamin Supplement 5.Coordination of Care  Monitor/Evaluate: Diet order, energy intake, nutrition focused physical findings, glucose and iron profile

## 2025-06-01 NOTE — DIETITIAN INITIAL EVALUATION ADULT - NSFNSGIIOFT_GEN_A_CORE
Dx: 89y/o male with h/o CAD, CABG, DVT on elliquis, PPM, HTN, HLD and indwelling Ritchie, presented to the ED for AMS and TME secondary to UTI. Noted to have failure to thrive. Currently in DNR/DNI status.

## 2025-06-01 NOTE — PROGRESS NOTE ADULT - SUBJECTIVE AND OBJECTIVE BOX
JUNO [facility]ART 89y Male  MRN#: [id number]     SUBJECTIVE  Patient is a 89y old Male who presents with a chief complaint of AMS (01 Jun 2025 12:16)    Interval/Overnight Events:    Today is hospital day 2d, and this morning he is lying in bed without distress.   No acute overnight events.     OBJECTIVE  PAST MEDICAL & SURGICAL HISTORY  PVD (peripheral vascular disease)    Diabetes    Pacemaker    H/O ischemic heart disease    HTN (hypertension)    Hypercholesteremia    Prostate disorder    S/P CABG (coronary artery bypass graft)    History of cholecystectomy    S/P placement of cardiac pacemaker      ALLERGIES:  No Known Allergies    MEDICATIONS:  STANDING MEDICATIONS  apixaban 2.5 milliGRAM(s) Oral every 12 hours  aspirin  chewable 81 milliGRAM(s) Oral daily  cefTRIAXone   IVPB 1000 milliGRAM(s) IV Intermittent every 24 hours  polyethylene glycol 3350 17 Gram(s) Oral two times a day  senna 2 Tablet(s) Oral at bedtime  sodium chloride 0.9%. 1000 milliLiter(s) IV Continuous <Continuous>    PRN MEDICATIONS    HOME MEDICATIONS  Home Medications:  aspirin 81 mg oral tablet: orally once a day (31 May 2025 03:51)  Bisacodyl 50 mg  PRN:  (31 May 2025 03:51)  Crestor 5 mg oral tablet: 1 tab(s) orally once a day (31 May 2025 03:51)  Farxiga 10 mg oral tablet: 1 tab(s) orally once a day (31 May 2025 03:51)  finasteride 5 mg oral tablet: 1 tab(s) orally once a day (31 May 2025 03:51)  Imdur 60 mg oral tablet, extended release: 1 tab(s) orally once a day (31 May 2025 03:51)  Janumet 50 mg-1000 mg oral tablet: 1 tab(s) orally 2 times a day (31 May 2025 03:51)  Linzess 290 mcg oral capsule: 1 cap(s) orally once a day (31 May 2025 03:51)  magnesium oxide 400 mg oral capsule: 1 cap(s) orally once a day (31 May 2025 03:51)  Multiple Vitamins oral capsule: 1 cap(s) orally once a day (31 May 2025 03:51)  pioglitazone 30 mg oral tablet: 1 tab(s) orally once a day (31 May 2025 03:51)  propranolol 40 mg oral tablet: 1 tab(s) orally 2 times a day (31 May 2025 03:51)  Ranexa 500 mg oral tablet, extended release: 1 tab(s) orally 2 times a day (31 May 2025 03:51)  sertraline 25 mg oral tablet: 1 tab(s) orally once a day (31 May 2025 03:51)  tamsulosin 0.4 mg oral capsule: 1 cap(s) orally 2 times a day (31 May 2025 03:51)  tamsulosin 0.4 mg oral capsule: 1 cap(s) orally 2 times a day (31 May 2025 03:51)  Vitamin B12 1 tab daily:  (31 May 2025 03:51)      LABS:                        9.5    9.05  )-----------( 150      ( 01 Jun 2025 11:49 )             29.5     06-01    142  |  106  |  17  ----------------------------<  227[H]  3.9   |  21  |  0.8    Ca    8.5      01 Jun 2025 11:49  Mg     1.9     06-01    TPro  5.8[L]  /  Alb  3.3[L]  /  TBili  0.2  /  DBili  x   /  AST  26  /  ALT  21  /  AlkPhos  100  06-01    LIVER FUNCTIONS - ( 01 Jun 2025 04:30 )  Alb: 3.3 g/dL / Pro: 5.8 g/dL / ALK PHOS: 100 U/L / ALT: 21 U/L / AST: 26 U/L / GGT: x             Urinalysis Basic - ( 01 Jun 2025 11:49 )    Color: x / Appearance: x / SG: x / pH: x  Gluc: 227 mg/dL / Ketone: x  / Bili: x / Urobili: x   Blood: x / Protein: x / Nitrite: x   Leuk Esterase: x / RBC: x / WBC x   Sq Epi: x / Non Sq Epi: x / Bacteria: x            Urinalysis with Rflx Culture (collected 30 May 2025 17:08)    Culture - Urine (collected 30 May 2025 17:08)  Source: Catheterized None  Preliminary Report (01 Jun 2025 07:16):    Culture positive, 10,000 - 49,000 CFU/mL . Identification to follow.    Culture - Blood (collected 30 May 2025 16:13)  Source: Blood Blood-Peripheral  Preliminary Report (31 May 2025 23:09):    No growth at 24 hours    Culture - Blood (collected 30 May 2025 16:13)  Source: Blood Blood-Peripheral  Preliminary Report (31 May 2025 23:09):    No growth at 24 hours          CAPILLARY BLOOD GLUCOSE      POCT Blood Glucose.: 204 mg/dL (30 May 2025 14:19)      PHYSICAL EXAM:  T(C): 36.7 (06-01-25 @ 04:21), Max: 36.7 (06-01-25 @ 04:21)  HR: 64 (06-01-25 @ 04:21) (64 - 80)  BP: 173/71 (06-01-25 @ 04:21) (158/81 - 173/71)  RR: 20 (06-01-25 @ 04:21) (20 - 20)  SpO2: 98% (06-01-25 @ 04:21) (98% - 98%)    GENERAL: NAD  EENT: EOMI, conjunctiva and sclera clear  RESPIRATORY: Clear to auscultation bilaterally  CARDIOVASCULAR: Regular rate and rhythm  GASTROINTESTINAL: Abdomen Soft, Nontender, Nondistended  PSYCH: AAOx2    ADMISSION SUMMARY  Patient is a 89y old Male who presents with a chief complaint of AMS (01 Jun 2025 12:16)

## 2025-06-01 NOTE — DIETITIAN INITIAL EVALUATION ADULT - PERTINENT MEDS FT
MEDICATIONS  (STANDING):  apixaban 2.5 milliGRAM(s) Oral every 12 hours  aspirin  chewable 81 milliGRAM(s) Oral daily  cefTRIAXone   IVPB 1000 milliGRAM(s) IV Intermittent every 24 hours  finasteride 5 milliGRAM(s) Oral daily  isosorbide   mononitrate ER Tablet (IMDUR) 60 milliGRAM(s) Oral daily  polyethylene glycol 3350 17 Gram(s) Oral two times a day  ranolazine 500 milliGRAM(s) Oral two times a day  senna 2 Tablet(s) Oral at bedtime  sertraline 50 milliGRAM(s) Oral daily  sodium chloride 0.9%. 1000 milliLiter(s) (50 mL/Hr) IV Continuous <Continuous>    MEDICATIONS  (PRN):

## 2025-06-01 NOTE — DIETITIAN INITIAL EVALUATION ADULT - OTHER CALCULATIONS
Estimated Calorie Needs: [id number] x AF 1.4-1.2=[id number]/day -Due to weight loss, PI and PCM   Estimated Protein Needs: 89-114grams/day (1.4-1.8grams/kg of admit weight) -Due to age, weight loss, PI and PCM   Estimated Fluid Needs: 1841-2367mL/day (1mL/kcal)

## 2025-06-02 NOTE — PROGRESS NOTE ADULT - ASSESSMENT
89-year-old male past medical history of cad/cabg, dvt on elliquis, s/p ppm, htn, hl, indwelling keller presenting to the ED for AMS.     #AMS/TME 2/2 UTI  - per daughter Toña, pt has been having cognitive decline with behavioral changes (loss of appetite and activity) over the past several months  - chronic keller changes last Friday 5/23 at urology office  - positive UA on admission  - CT A/P: stool impaction, no hydro  - CTH: no acute findings  - blood NTD  - urine culture gorwing 10K-49K candida albicans  - Spoke with ID 6/2: c/w IV ceftriaxone, candida is likely a colonizer  - on IV Rocephin 1g q24hrs    #R foot infx s/p amputation  - podiatry eval noted, no plan for surgical intervention  - ESR/CRP: 127/88.5  - LE arterial doppler performed, pending read  -R foot xray performed, pending read    #constipation  - CT A/P: stool impaction noted  - miralax and senna qhs    #Failure to thrive  - IV fluid 2/2 decreased po intake  - Now DNR/DNI per family wishes  - Dietary consulted for recs regarding nutrition  - PT eval pending  - palliative eval regarding daughter Toña interest in discussing hospice and next steps with patient's overall decline in the past few months      #Troponinemia (resolving)  #CAD s/p CABG  #Hx DVT on Eliquis  #Hx of HTN/HLD  #Hx pacemaker  - Trop 68>63  - On HIE echo in 2022: EF 71%, Grade II diastolic dysfunction  - C/w aspirin 81mg qD  - C/w Eliquis 2.5mg BID   - C/w Imdur, Ranexa  - per EP, can follow up outpatient for routine monitoring of PPM      #DM2  - monitor FS    #BPH  - c/w finasteride    #Anxiety/Depression  - c/w Zoloft    DVT ppx: Eliquis  DNR/DNI 89-year-old male past medical history of cad/cabg, dvt on elliquis, s/p ppm, htn, hl, indwelling keller presenting to the ED for AMS.     #AMS/TME 2/2 UTI  - per daughter Toña, pt has been having cognitive decline with behavioral changes (loss of appetite and activity) over the past several months  - chronic keller changes last Friday 5/23 at urology office  - positive UA on admission  - CT A/P: stool impaction, no hydro  - CTH: no acute findings  - blood NTD  - urine culture gorwing 10K-49K candida albicans  - Spoke with ID 6/2: c/w IV ceftriaxone, candida is likely a colonizer  - on IV Rocephin 1g q24hrs    #R foot infx s/p amputation  - podiatry eval noted, no plan for surgical intervention  - ESR/CRP: 127/88.5  - LE arterial doppler performed, pending read  -R foot xray performed, pending read    #constipation  - CT A/P: stool impaction noted  - miralax and senna qhs    #Failure to thrive  - IV fluid 2/2 decreased po intake  - Now DNR/DNI per family wishes  - Dietary consulted for recs regarding nutrition  - PT eval pending  - palliative eval regarding daughter Toña interest in discussing hospice and next steps with patient's overall decline in the past few months      #Troponinemia (resolving)  #CAD s/p CABG  #Hx DVT on Eliquis  #Hx of HTN/HLD  #Hx pacemaker  - Trop 68>63  - On HIE echo in 2022: EF 71%, Grade II diastolic dysfunction  - C/w aspirin 81mg qD  - C/w Eliquis 2.5mg BID   - C/w Imdur, Ranexa  - added metoprolol succinate 25 mg qd for HTN  - per EP, can follow up outpatient for routine monitoring of PPM      #DM2  - monitor FS    #BPH  - c/w finasteride    #Anxiety/Depression  - c/w Zoloft    DVT ppx: Eliquis  DNR/DNI

## 2025-06-02 NOTE — PROGRESS NOTE ADULT - TIME BILLING
1st day w/ pt - hosp day 3    update dtr w/ GOC conversation 25 min    complex care coord    complex chart/image/lab/med review    complex care plan    cond a threat to life    poor and guarded prog

## 2025-06-02 NOTE — PROGRESS NOTE ADULT - SUBJECTIVE AND OBJECTIVE BOX
Podiatry Progress Note    Subjective:  JUNO HEART is a  89y Male.   Seen bedside.   Patient is a 89y old  Male who presents with a chief complaint of AMS (02 Jun 2025 11:57). Podiatry has been monitoring patient for assessment of wounds on right Hallux amputation site and at heels.     Past Medical History and Surgical History  PAST MEDICAL & SURGICAL HISTORY:  PVD (peripheral vascular disease)      Diabetes      Pacemaker      H/O ischemic heart disease      HTN (hypertension)      Hypercholesteremia      Prostate disorder      S/P CABG (coronary artery bypass graft)      History of cholecystectomy      S/P placement of cardiac pacemaker           Objective:  Vital Signs Last 24 Hrs  T(C): 36.8 (02 Jun 2025 12:50), Max: 36.8 (02 Jun 2025 05:25)  T(F): 98.3 (02 Jun 2025 12:50), Max: 98.3 (02 Jun 2025 05:25)  HR: 79 (02 Jun 2025 12:50) (68 - 80)  BP: 174/73 (02 Jun 2025 12:50) (151/78 - 174/73)  BP(mean): --  RR: 18 (02 Jun 2025 12:50) (18 - 18)  SpO2: 97% (02 Jun 2025 12:50) (97% - 99%)    Parameters below as of 02 Jun 2025 12:50  Patient On (Oxygen Delivery Method): room air                            9.1    10.13 )-----------( 156      ( 02 Jun 2025 05:47 )             28.6                 06-02    141  |  106  |  20  ----------------------------<  205[H]  4.3   |  24  |  0.9    Ca    8.7      02 Jun 2025 05:47  Mg     1.8     06-02    TPro  5.8[L]  /  Alb  3.3[L]  /  TBili  0.2  /  DBili  x   /  AST  26  /  ALT  21  /  AlkPhos  100  06-01        Physical Exam - Lower Extremity Focused:   Derm:   -RLE: Wound on Right Hallux Amputation site- PTB, adrianna-wound eschar demarcation and maceration and fibrotic tissue, gangrene demarcation on lateral dorsal surface of fifth digit, ulcer of hindfoot stable, not showing signs of active drainage, adrianna-wound erythema,   -LLE: Wound on Left Heel, eschar tissue, superficial drainage, adrianna-wound erythema, no signs of active purulence  Vascular: DP and PT Pulses Intact, Foot is Warm to Warm to the touch;  Neuro: Protective Sensation Diminished / Moderately Neuropathic   MSK: Pain On Palpation at Wound Site, Hallux Amputation RF     Assessment:  Diabetic Wounds, bilateral feet    Plan:  Chart reviewed and Patient evaluated. All Questions and Concerns Addressed and Answered  Local Wound Care; Wound dressed w/ Xeroform/ Gauze / DSD / Kerlix.   Weight Bearing Status; WBAT   Continue w/ Local Wound Care; Q24 Dressing Changes;  No Further Sx Debridement;   Patient Stable Per Podiatry Standpoint; Follow Up in Outpatient Clinic  Discussed Plan w/ Attending; Dr. Robertson    Podiatry

## 2025-06-02 NOTE — PROGRESS NOTE ADULT - ASSESSMENT
89-year-old man past medical history of cad/cabg, dvt on elliquis, s/p ppm, htn, hl, indwelling keller presenting to the ED for AMS.     # pt is immunocompromised 2/2 age; CAD    # family interested in hospice at SNF (or [facility])  hospice eval  d/c IVFs    # AMS 2/2 Metab enceph 2/2 UTI 2/2 chr keller, in background of very severe dementia  - per daughter Toña, pt has been having cognitive decline with behavioral changes (loss of appetite and activity) over the past several months  pt does not walk at all for several mo  he is completely dependent for all ADLs, including feeding  he only speaks a couple of words now  he has almost no interaction w/ anyone at home  chronic keller changes last Friday 5/23 at urology office  positive UA on admission  CT A/P: stool impaction, no hydro  CTH: no acute findings  blood cx: neg  urine culture: candida - per ID, no tx    # 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  podiatry eval noted  /CRP 88.5  check LE arterial doppler - dtr says she is aware pt has PVD of distal rt leg, which cannot be fixed  R foot xray: degen changes; vasc calcif; s/p 1st toe amp; cystic change/erosion deep to ST swelling at medial 1st MT head (r/o OM)  ID f/u  abx: rocephin 1gm iv q24  ID and Pod to d/w dtr about tx plans and see if MRI really needed (pt heading towards hospice)  tx pain prn - consider pall care  local wound care to rt foot    # constipation  CT A/P: stool impaction noted  bowel reg: miralax q12 and senna 2 qhs  dulcolax supp pr x1    # Failure to thrive  hospice eval  family does not want NGT or PEG    # Troponinemia (resolving); CAD s/p CABG; Hx DVT on Eliquis; Hx of HTN/HLD; Hx pacemaker  - Trop 68 -> 63  - On HIE echo in 2022: EF 71%, Grade II diastolic dysfunction  - C/w aspirin 81mg qD  - C/w Eliquis 2.5mg BID   - C/w Imdur, Ranexa  start Toprol XL 25mg po q24  - per EP, can follow up outpatient for routine monitoring of PPM    # DM2  diabetic dash diet  can d/c FS if going on hospice    # BPH  d/c finasteride  has keller - chronic (changed on this admit)    # Anxiety/Depression  c/w Zoloft 50 q24    # DVT ppx: Eliquis    # Activity: pt does NOT walk at baseline w/ rt leg contracture; no PT eval    # DNR/DNI    # long update w/ dtr    Dispo: f/u ID re abx; f/u Pod re DFU; f/u [facility]; hospice eval; d/c proscar; consider d/c FS; c/w keller; tx BM;   eventually, pt will likely go on hospice to SNF - f/u CM/hospice - if going on hospice, can d/c soon    Long term prog is extremely poor. Life expectancy is < 6 mo.

## 2025-06-02 NOTE — PROGRESS NOTE ADULT - SUBJECTIVE AND OBJECTIVE BOX
JUNO HEART  89y  Male  ***My note supersedes ALL resident notes that I sign.  My corrections for their notes are in my note.***    I can be reached directly via Teams or my office number is [phone] or [phone].    INTERVAL EVENTS: Here for f/u of UTI. Had very long talk w/ dtr. She is really just interested in pt's comfort. She does not want any invasive procedures. She wants to speak w/ hospice. She wants pt to be in a facility - she cannot care for him at home. Pt is min verbal, if at all. he does not really recognize family - certainly not all the time.    T(F): 98.3 (06-02-25 @ 12:50), Max: 98.3 (06-02-25 @ 05:25)  HR: 79 (06-02-25 @ 12:50) (73 - 80)  BP: 174/73 (06-02-25 @ 12:50) (151/78 - 174/73)  RR: 18 (06-02-25 @ 12:50) (18 - 18)  SpO2: 97% (06-02-25 @ 12:50) (97% - 99%)    06-01-25 @ 07:01  -  06-02-25 @ 07:00  --------------------------------------------------------  IN: 0 mL / OUT: 300 mL / NET: -300 mL    06-02-25 @ 07:01  -  06-02-25 @ 16:59  --------------------------------------------------------  IN: 0 mL / OUT: 700 mL / NET: -700 mL    Gen: NAD; not interactive; looks debilitated   HEENT: PERRL, nose clr  Neck: no nodes, no JVD, thyroid nl  lungs: clr  hrt: s1 s2 rrr no murmur  abd: soft, NT/ND, no HS megaly  ext: no edema, no c/c; rt leg in full flex contracture at knee (cannot extend leg)  neuro: not alert, not oriented, can move arms a little;     LABS:                      9.1     (    93.2   10.13 )-----------( ---------      156      ( 02 Jun 2025 05:47 )             28.6    (    15.8     WBC Count: 10.13 K/uL (06-02-25 @ 05:47)  WBC Count: 9.05 K/uL (06-01-25 @ 11:49)  WBC Count: 8.38 K/uL (06-01-25 @ 04:30)  WBC Count: 9.72 K/uL (05-30-25 @ 16:13)    141   (   106   (   205      06-02-25 @ 05:47  ----------------------               4.3   (   24   (   20                             -----                        0.9  Ca  8.7   Mg  1.8    P   --     PT/INR - ( 02 Jun 2025 05:47 )   PT: 13.80 sec;   INR: 1.17 ratio    PTT - ( 02 Jun 2025 05:47 )  PTT: 31.0 sec    Urinalysis Basic - ( 02 Jun 2025 05:47 )    Color: x / Appearance: x / SG: x / pH: x  Gluc: 205 mg/dL / Ketone: x  / Bili: x / Urobili: x   Blood: x / Protein: x / Nitrite: x   Leuk Esterase: x / RBC: x / WBC x   Sq Epi: x / Non Sq Epi: x / Bacteria: x    Culture - Urine (collected 05-30-25 @ 17:08)  Source: Catheterized None  Final Report (06-01-25 @ 16:13):    10,000 - 49,000 CFU/mL Candida albicans "Susceptibilities not performed"    Culture - Blood (collected 05-30-25 @ 16:13)  Source: Blood Blood-Peripheral  Preliminary Report (06-01-25 @ 23:01):    No growth at 48 Hours    Culture - Blood (collected 05-30-25 @ 16:13)  Source: Blood Blood-Peripheral  Preliminary Report (06-01-25 @ 23:01):    No growth at 48 Hours    RADIOLOGY & ADDITIONAL TESTS:  < from: Xray Foot AP + Lateral + Oblique, Right (06.01.25 @ 13:01) >  Degenerative changes of the foot diffusely. Vascular calcifications.    Post amputation of the first toe, with soft tissue swelling at the   amputation bed. Cystic change/erosion deep to the area of soft tissue   swelling at the medial aspect of the first metatarsal head. Osteomyelitis   is not excluded. Recommend MRI of the right forefoot for further evaluation.    < end of copied text >    < from: CT Head No Cont (05.30.25 @ 19:02) >  No acute intracranial pathology. No evidence of midline shift, mass effect or intracranial hemorrhage.    < end of copied text >    < from: CT Abdomen and Pelvis w/ IV Cont (05.30.25 @ 19:02) >  Stool impaction of the rectum.    Otherwise, no acute inflammatory pathology within the abdomen or pelvis    < end of copied text >    MEDICATIONS:  cefTRIAXone   IVPB 1000 milliGRAM(s) IV Intermittent every 24 hours    apixaban 2.5 milliGRAM(s) Oral every 12 hours  aspirin  chewable 81 milliGRAM(s) Oral daily  chlorhexidine 2% Cloths 1 Application(s) Topical daily  finasteride 5 milliGRAM(s) Oral daily  isosorbide   mononitrate ER Tablet (IMDUR) 60 milliGRAM(s) Oral daily  metoprolol succinate ER 25 milliGRAM(s) Oral daily  polyethylene glycol 3350 17 Gram(s) Oral two times a day  ranolazine 500 milliGRAM(s) Oral two times a day  senna 2 Tablet(s) Oral at bedtime  sertraline 50 milliGRAM(s) Oral daily

## 2025-06-02 NOTE — PHYSICAL THERAPY INITIAL EVALUATION ADULT - SPECIFY REASON(S)
Chart reviewed. Pt. currently without activity orders. PT evaluation on hold pending activity orders as appropriate. Will follow.
Pt. attempted to be seen for PT IE this AM. Pt. is currently off unit at Vascular lab, not able to be seen at this time. Will cont to f/u.

## 2025-06-02 NOTE — CONSULT NOTE ADULT - CONVERSATION DETAILS
Discussed with patient's daughter Toña. She states that patient has had a diagnosis of dementia and has had progressive decline for months. She states that they do not want aggressive, life-prolonging interventions, as this would simply prolong patient's suffering. Toña thinks that patient will need nursing facility placement. We additionally discussed hospice care which could focus on keeping the patient comfortable. She states that she will speak to her sister, Khadijah, so that they can make a decision together.

## 2025-06-02 NOTE — PROGRESS NOTE ADULT - SUBJECTIVE AND OBJECTIVE BOX
INTERVAL HPI/OVERNIGHT EVENTS:  Patient was seen and examined at bedside. As per nurse and wife, no o/n events, patient resting comfortably. No reported fever, chills, dizziness, weakness, HA, Changes in vision, CP, palpitations, SOB, cough, N/V/D/C, dysuria, changes in bowel movements, LE edema. ROS otherwise negative.    VITAL SIGNS:  T(F): 98.3 (06-02-25 @ 05:25)  HR: 80 (06-02-25 @ 05:25)  BP: 169/76 (06-02-25 @ 05:25)  RR: 18 (06-02-25 @ 05:25)  SpO2: 98% (06-02-25 @ 05:25)  Wt(kg): --    PHYSICAL EXAM:    Constitutional: WDWN, NAD  Respiratory: CTA b/l, poor air entry b/l, no wheezing, no rhonchi, no rales, without accessory muscle use and no intercostal retractions  Cardiovascular: RRR, normal S1S2, no M/R/G  Gastrointestinal: soft, NTND, no masses palpable, BS normal  Extremities: Warm, well perfused, pulses equal bilateral upper and lower extremities, no edema, no clubbing  Neurological: AAOx3, CN Grossly intact  Skin: Normal temperature, warm, dry    MEDICATIONS  (STANDING):  apixaban 2.5 milliGRAM(s) Oral every 12 hours  aspirin  chewable 81 milliGRAM(s) Oral daily  bisacodyl Suppository 10 milliGRAM(s) Rectal once  cefTRIAXone   IVPB 1000 milliGRAM(s) IV Intermittent every 24 hours  chlorhexidine 2% Cloths 1 Application(s) Topical daily  finasteride 5 milliGRAM(s) Oral daily  isosorbide   mononitrate ER Tablet (IMDUR) 60 milliGRAM(s) Oral daily  lactated ringers. 1000 milliLiter(s) (50 mL/Hr) IV Continuous <Continuous>  metoprolol succinate ER 25 milliGRAM(s) Oral daily  polyethylene glycol 3350 17 Gram(s) Oral two times a day  ranolazine 500 milliGRAM(s) Oral two times a day  senna 2 Tablet(s) Oral at bedtime  sertraline 50 milliGRAM(s) Oral daily    MEDICATIONS  (PRN):      Allergies    No Known Allergies    Intolerances        LABS:                        9.1    10.13 )-----------( 156      ( 02 Jun 2025 05:47 )             28.6     06-02    141  |  106  |  20  ----------------------------<  205[H]  4.3   |  24  |  0.9    Ca    8.7      02 Jun 2025 05:47  Mg     1.8     06-02    TPro  5.8[L]  /  Alb  3.3[L]  /  TBili  0.2  /  DBili  x   /  AST  26  /  ALT  21  /  AlkPhos  100  06-01    PT/INR - ( 02 Jun 2025 05:47 )   PT: 13.80 sec;   INR: 1.17 ratio         PTT - ( 02 Jun 2025 05:47 )  PTT:31.0 sec  Urinalysis Basic - ( 02 Jun 2025 05:47 )    Color: x / Appearance: x / SG: x / pH: x  Gluc: 205 mg/dL / Ketone: x  / Bili: x / Urobili: x   Blood: x / Protein: x / Nitrite: x   Leuk Esterase: x / RBC: x / WBC x   Sq Epi: x / Non Sq Epi: x / Bacteria: x        RADIOLOGY & ADDITIONAL TESTS:  Reviewed

## 2025-06-02 NOTE — PROGRESS NOTE ADULT - CONVERSATION DETAILS
Family wants hospice. Looking to severely limit med interventions. Pt is DNR/I.    Does NOT want PEG or NGT. Dtr understands why not to use IVFs at end-of-life and agrees.

## 2025-06-02 NOTE — CONSULT NOTE ADULT - SUBJECTIVE AND OBJECTIVE BOX
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  Daughter is at bedside.  State for the past few days patient has been more altered than usual.  Patient has decreased p.o. intake and decreased Keller output.  No nausea, vomiting, fevers, trouble breathing.  Daughters state that patient was recently seen at his urologist office and a urine was sent.  Keller was last changed on Friday.  Daughters would like medical management but state no heroic measures.  They would like to make the patient DNR/DNI at this time.    Vital Signs Last 24 Hrs  T(C): 36.3 (30 May 2025 23:20), Max: 37.3 (30 May 2025 15:40)  T(F): 97.4 (30 May 2025 23:20), Max: 99.2 (30 May 2025 15:40)  HR: 72 (30 May 2025 23:20) (67 - 75)  BP: 146/67 (30 May 2025 23:20) (126/79 - 179/97)  BP(mean): --  RR: 18 (30 May 2025 23:20) (15 - 20)  SpO2: 100% (30 May 2025 23:20) (96% - 100%)    Parameters below as of 30 May 2025 23:20  Patient On (Oxygen Delivery Method): room air    CT abdomen pelvis only positive for stool impaction  CT Head showed no intracranial pathology    UA is grossly positive for bacteria, yeasts, and casts    Hgb 9.9 (baseline 10s in 2024), MCV 94.1    Trop 68>63, BNP 2042    Patient admitted to telemetry for workup of AMS and troponinemia (30 May 2025 23:34)      ITEMS NOT CHECKED ARE NOT PRESENT    SOCIAL HISTORY:   Significant other/partner[ ]  Children[x ]  Rastafarian/Spirituality:  Substance hx:  [ ]   Tobacco hx:  [ ]   Alcohol hx: [ ]   Living Situation: [x ]Home  [ ]Long term care  [ ]Rehab [ ]Other  Home Services: [ ] HHA [ ] Yuly RN [ ] Hospice  Occupation:  Home Opioid hx:  [ ] Y [x ] N   I-Stop Reference No: [id number]    ADVANCE DIRECTIVES:    [ ] Full Code [ ] DNR  MOLST  [ ]  Living Will  [ ]   DECISION MAKER(s):  [ ] Health Care Proxy(s)  [ ] Surrogate(s)  [ ] Guardian           Name(s): Phone Number(s):    BASELINE (I)ADL(s) (prior to admission):  [place]: [ ]Total  [ ] Moderate [ ]Dependent      Allergies    No Known Allergies    Intolerances    MEDICATIONS  (STANDING):  apixaban 2.5 milliGRAM(s) Oral every 12 hours  aspirin  chewable 81 milliGRAM(s) Oral daily  bisacodyl Suppository 10 milliGRAM(s) Rectal once  cefTRIAXone   IVPB 1000 milliGRAM(s) IV Intermittent every 24 hours  chlorhexidine 2% Cloths 1 Application(s) Topical daily  finasteride 5 milliGRAM(s) Oral daily  isosorbide   mononitrate ER Tablet (IMDUR) 60 milliGRAM(s) Oral daily  polyethylene glycol 3350 17 Gram(s) Oral two times a day  ranolazine 500 milliGRAM(s) Oral two times a day  senna 2 Tablet(s) Oral at bedtime  sertraline 50 milliGRAM(s) Oral daily  sodium chloride 0.9%. 1000 milliLiter(s) (50 mL/Hr) IV Continuous <Continuous>    MEDICATIONS  (PRN):      PRESENT SYMPTOMS: [ ]Unable to obtain due to poor mentation   Source if other than patient:  [ ]Family   [ ]Team     Pain: [ ]yes [ ]no  QOL impact -   Location -                    Aggravating factors -  Alleviating factors -   Quality -  Radiation -  Timing -   Severity (0-10 scale):  Minimal acceptable level (0-10 scale):     CPOT:    https://www.[facility].org/getattachment/[email or web address]/Critical-Care-Pain-Observation-Tool-(CPOT)    PAIN AD Score:   http://geriatrictoolkit.[place].[facility]/cog/painad.pdf     Dyspnea:                           [ ]None[ ]Mild [ ]Moderate [ ]Severe     Respiratory Distress Observation Scale (RDOS):   A score of 0 to 2 signifies little or no respiratory distress, 3 signifies mild distress, scores 4 to 6 indicate moderate distress, and scores greater than 7 signify severe distress  [email or web address]://www.[email or web address]/sites/default/[email or web address]-extniijokhm-srkoewlc-[email or web address].[email or web address]    Anxiety:                             [ ]None[ ]Mild [ ]Moderate [ ]Severe   Fatigue:                             [ ]None[ ]Mild [ ]Moderate [ ]Severe   Nausea:                             [ ]None[ ]Mild [ ]Moderate [ ]Severe   Loss of appetite:              [ ]None[ ]Mild [ ]Moderate [ ]Severe   Constipation:                    [ ]None[ ]Mild [ ]Moderate [ ]Severe    Other Symptoms:  [ ]All other review of systems negative     PHYSICAL EXAM:    GENERAL:  NAD   PULMONARY:  Non labored breathing  NEUROLOGIC: Grossly intact  BEHAVIORAL/PSYCH:  Calm    Palliative Performance Status Version 2:         %    http://[facility].org/files/news/palliative_performance_scale_[email or web address].pdf    LABS: I have reviewed daily labs, including                          9.1    10.13 )-----------( 156      ( 02 Jun 2025 05:47 )             28.6       06-02    141  |  106  |  20  ----------------------------<  205[H]  4.3   |  24  |  0.9    Ca    8.7      02 Jun 2025 05:47  Mg     1.8     06-02    TPro  5.8[L]  /  Alb  3.3[L]  /  TBili  0.2  /  DBili  x   /  AST  26  /  ALT  21  /  AlkPhos  100  06-01          RADIOLOGY & ADDITIONAL STUDIES: I have independently reviewed new imaging, including    CTAP 5/30: Stool impaction of the rectum.    Otherwise, no acute inflammatory pathology within the abdomen or pelvis    PROTEIN CALORIE MALNUTRITION PRESENT: [ ]mild [ ]moderate [ ]severe [ ]underweight [ ]morbid obesity  https://www.andeal.org/vault/2440/web/files/ONC/Table_Clinical%20Characteristics%20to%20Document%20Malnutrition-White%20JV%20et%20al%202012.pdf    Height (cm): 182.9 (06-01-25 @ 21:09), 182.9 (12-09-24 @ 21:44), 182.9 (12-09-24 @ 00:38)  Weight (kg): 63.5 (05-30-25 @ 14:20), 68 (12-09-24 @ 21:44), 68 (12-09-24 @ 00:38)  BMI (kg/m2): 19 (06-01-25 @ 21:09), 19 (05-30-25 @ 14:20), 20.3 (12-09-24 @ 21:44)    [ ]PPSV2 < or = to 30% [ ]significant weight loss  [ ]poor nutritional intake  [ ]anasarca      [ ]Artificial Nutrition      Palliative Care Spiritual/Emotional Screening Tool Question  Severity (0-4):                    OR                    [ x] Unable to determine. Will assess at later time if appropriate.  Score of 2 or greater indicates recommendation of Chaplaincy and/or SW referral  Chaplaincy Referral: [ ] Yes [ ] Refused [ ] Following     Caregiver Desdemona:  [ ] Yes [ ] No    OR    [x ] Unable to determine. Will assess at later time if appropriate.  Social Work Referral [ ]  Patient and Family Centered Care Referral [ ]    Anticipatory Grief Present: [ ] Yes [ ] No    OR     [ x] Unable to determine. Will assess at later time if appropriate.  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  Daughter is at bedside.  State for the past few days patient has been more altered than usual.  Patient has decreased p.o. intake and decreased Keller output.  No nausea, vomiting, fevers, trouble breathing.  Daughters state that patient was recently seen at his urologist office and a urine was sent.  Keller was last changed on Friday.  Daughters would like medical management but state no heroic measures.  They would like to make the patient DNR/DNI at this time.    Vital Signs Last 24 Hrs  T(C): 36.3 (30 May 2025 23:20), Max: 37.3 (30 May 2025 15:40)  T(F): 97.4 (30 May 2025 23:20), Max: 99.2 (30 May 2025 15:40)  HR: 72 (30 May 2025 23:20) (67 - 75)  BP: 146/67 (30 May 2025 23:20) (126/79 - 179/97)  BP(mean): --  RR: 18 (30 May 2025 23:20) (15 - 20)  SpO2: 100% (30 May 2025 23:20) (96% - 100%)    Parameters below as of 30 May 2025 23:20  Patient On (Oxygen Delivery Method): room air    CT abdomen pelvis only positive for stool impaction  CT Head showed no intracranial pathology    UA is grossly positive for bacteria, yeasts, and casts    Hgb 9.9 (baseline 10s in 2024), MCV 94.1    Trop 68>63, BNP 2042    Patient admitted to telemetry for workup of AMS and troponinemia (30 May 2025 23:34)    Patient lying in bed. Discussed with daughter.     ITEMS NOT CHECKED ARE NOT PRESENT    SOCIAL HISTORY:   Significant other/partner[ ]  Children[x ]  Catholic/Spirituality:  Substance hx:  [ ]   Tobacco hx:  [ ]   Alcohol hx: [ ]   Living Situation: [x ]Home  [ ]Long term care  [ ]Rehab [ ]Other  Home Services: [ ] HHA [ ] Yuly RN [ ] Hospice  Occupation:  Home Opioid hx:  [ ] Y [x ] N   I-Stop Reference No: [id number]    ADVANCE DIRECTIVES:    [ ] Full Code [x ] DNR  MOLST  [ ]  Living Will  [ ]   DECISION MAKER(s):  [ ] Health Care Proxy(s)  [ ] Surrogate(s)  [ ] Guardian           Name(s): Phone Number(s):    BASELINE (I)ADL(s) (prior to admission):  [place]: [ ]Total  [ ] Moderate [ ]Dependent      Allergies    No Known Allergies    Intolerances    MEDICATIONS  (STANDING):  apixaban 2.5 milliGRAM(s) Oral every 12 hours  aspirin  chewable 81 milliGRAM(s) Oral daily  bisacodyl Suppository 10 milliGRAM(s) Rectal once  cefTRIAXone   IVPB 1000 milliGRAM(s) IV Intermittent every 24 hours  chlorhexidine 2% Cloths 1 Application(s) Topical daily  finasteride 5 milliGRAM(s) Oral daily  isosorbide   mononitrate ER Tablet (IMDUR) 60 milliGRAM(s) Oral daily  polyethylene glycol 3350 17 Gram(s) Oral two times a day  ranolazine 500 milliGRAM(s) Oral two times a day  senna 2 Tablet(s) Oral at bedtime  sertraline 50 milliGRAM(s) Oral daily  sodium chloride 0.9%. 1000 milliLiter(s) (50 mL/Hr) IV Continuous <Continuous>    MEDICATIONS  (PRN):      PRESENT SYMPTOMS: [x ]Unable to obtain due to poor mentation   Source if other than patient:  [ ]Family   [ ]Team     Pain: [ ]yes [ ]no  QOL impact -   Location -                    Aggravating factors -  Alleviating factors -   Quality -  Radiation -  Timing -   Severity (0-10 scale):  Minimal acceptable level (0-10 scale):     CPOT:    https://www.[facility].org/getattachment/[email or web address]-9547l1126s7e/Critical-Care-Pain-Observation-Tool-(CPOT)    PAIN AD Score: 0  http://geriatrictoolkit.[place].[facility]/cog/painad.pdf     Dyspnea:                           [ ]None[ ]Mild [ ]Moderate [ ]Severe     Respiratory Distress Observation Scale (RDOS): 0  A score of 0 to 2 signifies little or no respiratory distress, 3 signifies mild distress, scores 4 to 6 indicate moderate distress, and scores greater than 7 signify severe distress  https://www.[facility].ca/sites/default/files/PDFS/[email or web address]-ofuzvxnrikx-dcyzyhde-fkitnpkmacq-[email or web address].pdf    Anxiety:                             [ ]None[ ]Mild [ ]Moderate [ ]Severe   Fatigue:                             [ ]None[ ]Mild [ ]Moderate [ ]Severe   Nausea:                             [ ]None[ ]Mild [ ]Moderate [ ]Severe   Loss of appetite:              [ ]None[ ]Mild [ ]Moderate [ ]Severe   Constipation:                    [ ]None[ ]Mild [ ]Moderate [ ]Severe    Other Symptoms:  [ ]All other review of systems negative     PHYSICAL EXAM:    GENERAL:  NAD   PULMONARY:  Non labored breathing  NEUROLOGIC: Lying in bed  BEHAVIORAL/PSYCH:  Calm    Palliative Performance Status Version 2:       20  %    http://[facility].org/files/news/palliative_performance_scale_[email or web address].pdf    LABS: I have reviewed daily labs, including                          9.1    10.13 )-----------( 156      ( 02 Jun 2025 05:47 )             28.6       06-02    141  |  106  |  20  ----------------------------<  205[H]  4.3   |  24  |  0.9    Ca    8.7      02 Jun 2025 05:47  Mg     1.8     06-02    TPro  5.8[L]  /  Alb  3.3[L]  /  TBili  0.2  /  DBili  x   /  AST  26  /  ALT  21  /  AlkPhos  100  06-01          RADIOLOGY & ADDITIONAL STUDIES: I have independently reviewed new imaging, including    CTAP 5/30: Stool impaction of the rectum.    Otherwise, no acute inflammatory pathology within the abdomen or pelvis    PROTEIN CALORIE MALNUTRITION PRESENT: [ ]mild [ ]moderate [ ]severe [ ]underweight [ ]morbid obesity  https://www.andeal.org/vault/2440/web/files/ONC/Table_Clinical%20Characteristics%20to%20Document%20Malnutrition-White%20JV%20et%20al%202012.pdf    Height (cm): 182.9 (06-01-25 @ 21:09), 182.9 (12-09-24 @ 21:44), 182.9 (12-09-24 @ 00:38)  Weight (kg): 63.5 (05-30-25 @ 14:20), 68 (12-09-24 @ 21:44), 68 (12-09-24 @ 00:38)  BMI (kg/m2): 19 (06-01-25 @ 21:09), 19 (05-30-25 @ 14:20), 20.3 (12-09-24 @ 21:44)    [ ]PPSV2 < or = to 30% [ ]significant weight loss  [ ]poor nutritional intake  [ ]anasarca      [ ]Artificial Nutrition      Palliative Care Spiritual/Emotional Screening Tool Question  Severity (0-4):                    OR                    [ x] Unable to determine. Will assess at later time if appropriate.  Score of 2 or greater indicates recommendation of Chaplaincy and/or SW referral  Chaplaincy Referral: [ ] Yes [ ] Refused [ ] Following     Caregiver Friona:  [ ] Yes [ ] No    OR    [x ] Unable to determine. Will assess at later time if appropriate.  Social Work Referral [ ]  Patient and Family Centered Care Referral [ ]    Anticipatory Grief Present: [ ] Yes [ ] No    OR     [ x] Unable to determine. Will assess at later time if appropriate.  Social Work Referral [ ]  Patient and Family Centered Care Referral [ ]    REFERRALS:   [ ]Chaplaincy  [ ]Hospice  [ ]Child Life  [ ]Social Work  [ ]Case management [ ]Holistic Therapy     Palliative care education provided to patient and/or family

## 2025-06-02 NOTE — CONSULT NOTE ADULT - ASSESSMENT
89-year-old male past medical history of cad/cabg, dvt on elliquis, s/p ppm, htn, hl, indwelling keller presenting to the ED for AMS.  Patient has had progressive cognitive decline. Palliative care consulted to assist with GOC.      89-year-old male past medical history of cad/cabg, dvt on elliquis, s/p ppm, htn, hl, indwelling keller presenting to the ED for AMS.  Patient has had progressive cognitive decline. Palliative care consulted to assist with GOC.     Discussed with patient's daughter Toña. She states that patient has had a diagnosis of dementia and has had progressive decline for months. She states that they do not want aggressive, life-prolonging interventions, as this would simply prolong patient's suffering. Toña thinks that patient will need nursing facility placement. We additionally discussed hospice care which could focus on keeping the patient comfortable. She states that she will speak to her sister, Khadijah, so that they can make a decision together.     Plan:  - symptoms per primary team  - family would like to discuss with podiatry  - ongoing discussions regarding hospice  - DNR/DNI    Palliative care will continue to follow.   Please call [phone] with questions or concerns 24/7.     _____________  Matt Shipman MD  Palliative Medicine  [facility]   of Geriatric and Palliative Medicine  [phone]

## 2025-06-03 NOTE — PROGRESS NOTE ADULT - SUBJECTIVE AND OBJECTIVE BOX
JUNO [employer]  89y  Male  ***My note supersedes ALL resident notes that I sign.  My corrections for their notes are in my note.***    I can be reached directly via Teams or my office number is [phone] or [phone].    INTERVAL EVENTS: Here for f/u of foot ulcer. Pt more awake today, but not interactive. Pt a little more restless, but overall seems reasonably comfortable.    T(F): 97.8 (06-03-25 @ 12:59), Max: 101.1 (06-02-25 @ 23:00)  HR: 79 (06-03-25 @ 12:59) (67 - 79)  BP: 157/76 (06-03-25 @ 12:59) (141/57 - 168/66)  RR: 18 (06-03-25 @ 12:59) (18 - 18)  SpO2: 98% (06-03-25 @ 12:59) (98% - 98%)    06-02-25 @ 07:01  -  06-03-25 @ 07:00  --------------------------------------------------------  IN: 0 mL / OUT: 700 mL / NET: -700 mL    Gen: NAD; not interactive; looks debilitated   HEENT: PERRL, nose clr  Neck: no nodes, no JVD, thyroid nl  lungs: clr  hrt: s1 s2 rrr no murmur  abd: soft, NT/ND, no HS megaly  ext: no edema, no c/c; rt leg in full flex contracture at knee (cannot extend leg)  neuro: not alert, not oriented, can move arms a little;     LABS:                      9.7     (    93.0   12.01 )-----------( ---------      149      ( 03 Jun 2025 05:10 )             30.5    (    15.6     WBC Count: 12.01 K/uL (06-03-25 @ 05:10) - worse  WBC Count: 10.13 K/uL (06-02-25 @ 05:47)  WBC Count: 9.05 K/uL (06-01-25 @ 11:49)  WBC Count: 8.38 K/uL (06-01-25 @ 04:30)  WBC Count: 9.72 K/uL (05-30-25 @ 16:13)    142   (   105   (   230      06-03-25 @ 05:10  ----------------------               3.7   (   23   (   23                             -----                        0.8  Ca  9.0   Mg  2.0    P   --     PT/INR - ( 02 Jun 2025 05:47 )   PT: 13.80 sec;   INR: 1.17 ratio    PTT - ( 02 Jun 2025 05:47 )  PTT: 31.0 sec    Urinalysis Basic - ( 03 Jun 2025 05:10 )    Color: x / Appearance: x / SG: x / pH: x  Gluc: 230 mg/dL / Ketone: x  / Bili: x / Urobili: x   Blood: x / Protein: x / Nitrite: x   Leuk Esterase: x / RBC: x / WBC x   Sq Epi: x / Non Sq Epi: x / Bacteria: x    Culture - Urine (collected 05-30-25 @ 17:08)  Source: Catheterized None  Final Report (06-01-25 @ 16:13):    10,000 - 49,000 CFU/mL Candida albicans "Susceptibilities not performed"    Culture - Blood (collected 05-30-25 @ 16:13)  Source: Blood Blood-Peripheral  Preliminary Report (06-02-25 @ 23:01):    No growth at 72 Hours    Culture - Blood (collected 05-30-25 @ 16:13)  Source: Blood Blood-Peripheral  Preliminary Report (06-02-25 @ 23:01):    No growth at 72 Hours    RADIOLOGY & ADDITIONAL TESTS:  < from: VA Duplex Lower Extrem Arterial, Bilat (06.02.25 @ 09:34) >  Moderate stenosis of the right superficial femoral artery.    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    < end of copied text >    MEDICATIONS:  cefTRIAXone   IVPB 1000 milliGRAM(s) IV Intermittent every 24 hours  doxycycline monohydrate Capsule 100 milliGRAM(s) Oral every 12 hours    apixaban 2.5 milliGRAM(s) Oral every 12 hours  aspirin  chewable 81 milliGRAM(s) Oral daily  chlorhexidine 2% Cloths 1 Application(s) Topical daily  isosorbide   mononitrate ER Tablet (IMDUR) 60 milliGRAM(s) Oral daily  lactated ringers. 1000 milliLiter(s) IV Continuous <Continuous>  metoprolol succinate ER 25 milliGRAM(s) Oral daily  polyethylene glycol 3350 17 Gram(s) Oral two times a day  ranolazine 500 milliGRAM(s) Oral two times a day  senna 2 Tablet(s) Oral at bedtime  sertraline 50 milliGRAM(s) Oral daily

## 2025-06-03 NOTE — PROGRESS NOTE ADULT - SUBJECTIVE AND OBJECTIVE BOX
INTERVAL HPI/OVERNIGHT EVENTS:  Patient was seen and examined at bedside. Febrile o/n; Tmax 101.1. Otherwise patient resting comfortably in bed. No complaints reported from patient's daughter.    VITAL SIGNS:  T(F): 98 (06-03-25 @ 07:51)  HR: 74 (06-03-25 @ 07:51)  BP: 141/57 (06-03-25 @ 07:51)  RR: 18 (06-03-25 @ 04:19)  SpO2: 98% (06-03-25 @ 04:19)  Wt(kg): --    PHYSICAL EXAM:    Constitutional: WDWN, NAD  Respiratory: CTA b/l, good air entry b/l, no wheezing, no rhonchi, no rales, without accessory muscle use and no intercostal retractions  Cardiovascular: RRR, normal S1S2, no M/R/G  Gastrointestinal: soft, NTND, no masses palpable, BS normal  Extremities: Warm, well perfused, pulses equal bilateral upper and lower extremities, no edema, no clubbing  Neurological: AAOx3, CN Grossly intact  Skin: Normal temperature, warm, dry    MEDICATIONS  (STANDING):  apixaban 2.5 milliGRAM(s) Oral every 12 hours  aspirin  chewable 81 milliGRAM(s) Oral daily  cefTRIAXone   IVPB 1000 milliGRAM(s) IV Intermittent every 24 hours  chlorhexidine 2% Cloths 1 Application(s) Topical daily  isosorbide   mononitrate ER Tablet (IMDUR) 60 milliGRAM(s) Oral daily  lactated ringers. 1000 milliLiter(s) (50 mL/Hr) IV Continuous <Continuous>  metoprolol succinate ER 25 milliGRAM(s) Oral daily  polyethylene glycol 3350 17 Gram(s) Oral two times a day  ranolazine 500 milliGRAM(s) Oral two times a day  senna 2 Tablet(s) Oral at bedtime  sertraline 50 milliGRAM(s) Oral daily    MEDICATIONS  (PRN):      Allergies    No Known Allergies    Intolerances        LABS:                        9.7    12.01 )-----------( 149      ( 03 Jun 2025 05:10 )             30.5     06-03    142  |  105  |  23[H]  ----------------------------<  230[H]  3.7   |  23  |  0.8    Ca    9.0      03 Jun 2025 05:10  Mg     2.0     06-03      PT/INR - ( 02 Jun 2025 05:47 )   PT: 13.80 sec;   INR: 1.17 ratio         PTT - ( 02 Jun 2025 05:47 )  PTT:31.0 sec  Urinalysis Basic - ( 03 Jun 2025 05:10 )    Color: x / Appearance: x / SG: x / pH: x  Gluc: 230 mg/dL / Ketone: x  / Bili: x / Urobili: x   Blood: x / Protein: x / Nitrite: x   Leuk Esterase: x / RBC: x / WBC x   Sq Epi: x / Non Sq Epi: x / Bacteria: x        RADIOLOGY & ADDITIONAL TESTS:  Reviewed

## 2025-06-03 NOTE — PROGRESS NOTE ADULT - ASSESSMENT
89-year-old man past medical history of cad/cabg, dvt on elliquis, s/p ppm, htn, hl, indwelling keller presenting to the ED for AMS.     # pt is immunocompromised 2/2 age; CAD    # family interested in hospice at SNF ([facility] or [facility])  hospice eval -> begin d/c planning  d/c IVFs  start Morphine Conc Liq 5mg SL q4 prn pain or agitation    # AMS 2/2 Metab enceph 2/2 UTI 2/2 chr keller, in background of very severe dementia  - per daughter Toña, pt has been having cognitive decline with behavioral changes (loss of appetite and activity) over the past several months  pt does not walk at all for several mo  he is completely dependent for all ADLs, including feeding  he only speaks a couple of words now  he has almost no interaction w/ anyone at home  chronic keller changes last Friday 5/23 at urology office  positive UA on admission  CT A/P: stool impaction, no hydro  CTH: no acute findings  blood cx: neg  urine culture: candida  afng: Diflucan 400mg po x1, then 200mg po q24 x6 (1 wk tx total - end 6/9)    # 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; chr OM; PAD  podiatry eval noted  /CRP 88.5  LE arterial doppler - mod sten Rt SFA; Lt PT Art Occ - family does NOT want ANY vasc intervention  R foot xray: degen changes; vasc calcif; s/p 1st toe amp; cystic change/erosion deep to ST swelling at medial 1st MT head (r/o OM)  ID f/u  palliative abx: rocephin 1gm iv q24 + doxy 100mg po q12 (might need to iv if not swallowing well) - d/w ID re duration  ID and Pod to d/w dtr about tx plans and see if MRI really needed (pt heading towards hospice)  local wound care to rt foot    # constipation  CT A/P: stool impaction noted  bowel reg: miralax q12 and senna 2 qhs    # Failure to thrive  hospice eval  family does not want NGT or PEG    # Troponinemia (resolving); CAD s/p CABG; Hx DVT on Eliquis; Hx of HTN/HLD; Hx pacemaker  - Trop 68 -> 63  - On HIE echo in 2022: EF 71%, Grade II diastolic dysfunction  - C/w aspirin 81mg qD  - C/w Eliquis 2.5mg BID   - C/w Imdur, Ranexa  c/w Toprol XL 25mg po q24  above can be altered once on hospice at SNF  per EP, can follow up outpatient for routine monitoring of PPM    # DM2  diabetic dash diet  can d/c FS if going on hospice    # BPH  d/c finasteride  has keller - chronic (changed on this admit)    # Anxiety/Depression  c/w Zoloft 50 q24    # DVT ppx: Eliquis    # Activity: pt does NOT walk at baseline w/ rt leg contracture; no PT eval    # DNR/DNI    # long update w/ 2nd dtr at bedside - she also agrees w/ hospice and palliative approach    Dispo: f/u ID re abx/afng; f/u Pod re DFU; hospice eval; d/c FS; c/w keller; tx BM;   eventually, pt will go on hospice to SNF ([facility]) - f/u CM/hospice - since going on hospice, can d/c when bed avail    Long term prog is extremely poor. Life expectancy is < 6 mo.

## 2025-06-03 NOTE — PROGRESS NOTE ADULT - TIME BILLING
I have personally seen and examined this patient.  I have reviewed all pertinent clinical information and reviewed all relevant imaging and diagnostic studies personally.   I counseled the patient about diagnostic testing and treatment plan.   I discussed my recommendations with the primary team Sukhjinder I have personally seen and examined this patient.  I have reviewed all pertinent clinical information and reviewed all relevant imaging and diagnostic studies personally.   I counseled the patient about diagnostic testing and treatment plan.   I discussed my recommendations with the primary team Nicko

## 2025-06-03 NOTE — ADVANCED PRACTICE NURSE CONSULT - RECOMMEDATIONS
Cleanse wounds to bilateral gluteal folds and right lower buttock with soap and water.   Pat dry, apply triad cover with abdominal pad twice a day and prn for soiling.     Maintain pressure injury prevention.   Keep skin clean.   Maintain incontinence care.   Monitor skin for changes and notify provider

## 2025-06-03 NOTE — PROGRESS NOTE ADULT - TIME BILLING
Total time spent caring for the patient includes time spent before the visit reviewing the chart, time spent during the visit, time spent after the visit on documentation, and time spent communicating with patient/family, and other providers.

## 2025-06-03 NOTE — PROGRESS NOTE ADULT - ASSESSMENT
89-year-old male past medical history of cad/cabg, dvt on elliquis, s/p ppm, htn, hl, indwelling keller presenting to the ED for AMS.     #AMS/TME 2/2 UTI  - per daughter Toña, pt has been having cognitive decline with behavioral changes (loss of appetite and activity) over the past several months  - chronic keller changes last Friday 5/23 at urology office  - positive UA on admission  - CT A/P: stool impaction, no hydro  - CTH: no acute findings  - blood NTD  - urine culture gorwing 10K-49K candida albicans  - Pending ID eval  - on IV Rocephin 1g q24hrs    #R foot infx s/p amputation  - podiatry eval noted, no plan for surgical intervention  - ESR/CRP: 127/88.5  - LE arterial doppler 6/2: Moderate stenosis of the right superficial femoral artery. 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  - R foot xray 6/1: Post amputation of the first toe, with soft tissue swelling at the   amputation bed. Cystic change/erosion deep to the area of soft tissue   swelling at the medial aspect of the first metatarsal head. Osteomyelitis   is not excluded. Recommend MRI of the right forefoot for further   evaluation.    #constipation  - CT A/P: stool impaction noted  - miralax and senna qhs    #Failure to thrive  - IV fluid 2/2 decreased po intake  - Now DNR/DNI per family wishes  - Dietary consulted for recs regarding nutrition  - PT eval pending  - palliative eval regarding daughter Toña interest in discussing hospice and next steps with patient's overall decline in the past few months      #Troponinemia (resolving)  #CAD s/p CABG  #Hx DVT on Eliquis  #Hx of HTN/HLD  #Hx pacemaker  - Trop 68>63  - On HIE echo in 2022: EF 71%, Grade II diastolic dysfunction  - C/w aspirin 81mg qD  - C/w Eliquis 2.5mg BID   - C/w Imdur, Ranexa  - added metoprolol succinate 25 mg qd for HTN  - per EP, can follow up outpatient for routine monitoring of PPM      #DM2  - monitor FS    #BPH  - c/w finasteride    #Anxiety/Depression  - c/w Zoloft    DVT ppx: Eliquis  DNR/DNI

## 2025-06-03 NOTE — PROGRESS NOTE ADULT - ASSESSMENT
ASSESSMENT  89-year-old male past medical history of cad/cabg, AVR, dvt on elliquis, s/p ppm, htn, hl, indwelling keller presenting to the ED for AMS.     IMPRESSION  #Fever  #  -RLE: Wound on Right Hallux Amputation site- PTB, adrianna-wound eschar demarcation and maceration and fibrotic tissue, gangrene demarcation on lateral dorsal surface of fifth digit, ulcer of hindfoot stable, not showing signs of active drainage, adrianna-wound erythema,   -LLE: Wound on Left Heel, eschar tissue, superficial drainage, adrianna-wound erythema, no signs of active purulence  < from: Xray Foot AP + Lateral + Oblique, Right (06.01.25 @ 13:01) >  Post amputation of the first toe, with soft tissue swelling at the   amputation bed. Cystic change/erosion deep to the area of soft tissue   swelling at the medial aspect of the first metatarsal head. Osteomyelitis   is not excluded. Recommend MRI of the right forefoot for further   evaluatio  #Rule out complicated cystitis     Admission WBC 9 ; Sepsis ruled out on admission   Chronic keller ; UCX   10,000 - 49,000 CFU/mL Candida albicans "Susceptibilities not performed"- colonizer    5/30 BCX NGTD     UA pyuria    CT abdomen pelvis only positive for stool impaction  #PPM / AVR  #Immunodeficiency secondary to Senescence  which could result in poor clinical outcomes  #Abx allergy: No Known Allergies    Creatinine: 0.9 (05-30-25 @ 16:13)    Height (cm): 182.9 (05-30-25 @ 14:20)  Weight (kg): 63.5 (05-30-25 @ 14:20)    RECOMMENDATIONS  -    If any questions, please send a message or call on [employer] Teams  Please continue to update ID with any pertinent new laboratory, radiographic findings, or change in clinical status ASSESSMENT  89-year-old male past medical history of cad/cabg, AVR, dvt on elliquis, s/p ppm, htn, hl, indwelling keller presenting to the ED for AMS.     IMPRESSION  #Fever  #Suspected chronic osteomyelitis R foot  -RLE: Wound on Right Hallux Amputation site- PTB, adrianna-wound eschar demarcation and maceration and fibrotic tissue, gangrene demarcation on lateral dorsal surface of fifth digit, ulcer of hindfoot stable, not showing signs of active drainage, adrianna-wound erythema,   -LLE: Wound on Left Heel, eschar tissue, superficial drainage, adrianna-wound erythema, no signs of active purulence  < from: Xray Foot AP + Lateral + Oblique, Right (06.01.25 @ 13:01) >  Post amputation of the first toe, with soft tissue swelling at the   amputation bed. Cystic change/erosion deep to the area of soft tissue   swelling at the medial aspect of the first metatarsal head. Osteomyelitis   is not excluded. Recommend MRI of the right forefoot for further   evaluatio  #Rule out complicated cystitis     Admission WBC 9 ; Sepsis ruled out on admission   Chronic keller ; UCX   10,000 - 49,000 CFU/mL Candida albicans "Susceptibilities not performed"-     5/30 BCX NGTD     UA pyuria    CT abdomen pelvis only positive for stool impaction  #PPM / AVR  #Immunodeficiency secondary to Senescence  which could result in poor clinical outcomes  #Abx allergy: No Known Allergies    Creatinine: 0.9 (05-30-25 @ 16:13)    Height (cm): 182.9 (05-30-25 @ 14:20)  Weight (kg): 63.5 (05-30-25 @ 14:20)    RECOMMENDATIONS  - Fever, send BCX, SARS-CoV-2 PCR ., MRSA nares   - As discharge at meatus/keller- Fluconazole PO 400mg x1 then 200mg x 6 days   - Appreciate Podiatry consult  - Suspect chronic osteomyelitis -   - On IV Ceftriaxone 1g q24h IV    - ADD PO doxy 100mg BID for skin coverage  If any questions, please send a message or call on [email or web address] Teams  Please continue to update ID with any pertinent new laboratory, radiographic findings, or change in clinical status

## 2025-06-03 NOTE — PROGRESS NOTE ADULT - ASSESSMENT
89-year-old male past medical history of cad/cabg, dvt on elliquis, s/p ppm, htn, hl, indwelling keller presenting to the ED for AMS.  Patient has had progressive cognitive decline. Palliative care consulted to assist with GOC.     6/2: Discussed with patient's daughter Toña. She states that patient has had a diagnosis of dementia and has had progressive decline for months. She states that they do not want aggressive, life-prolonging interventions, as this would simply prolong patient's suffering. Toña thinks that patient will need nursing facility placement. We additionally discussed hospice care which could focus on keeping the patient comfortable. She states that she will speak to her sister, Khadijah, so that they can make a decision together.     6/3: Discussed with daughter, Khadijah. She states that she had a chance to speak to Toña yesterday and confirm that they have decided on hospice at facility once patient is stable for discharge. We discussed CMO; Khadijah would like to continue with limited medical management at this time.     Plan:  - symptoms per primary team  - continue medical management  - family agreeable to hospice at facility upon discharge  - DNR/DNI    Palliative care will sign off.  Please call [phone] with questions or concerns 24/7.     _____________  Matt Shipman MD  Palliative Medicine  [facility]   [facility]  [phone]

## 2025-06-03 NOTE — ADVANCED PRACTICE NURSE CONSULT - ASSESSMENT
History of Present Illness:   89-year-old male past medical history of cad/cabg, dvt on elliquis, s/p ppm, htn, hl, indwelling keller presenting to the ED for AMS.  Daughter is at bedside.  State for the past few days patient has been more altered than usual.  Patient has decreased p.o. intake and decreased Keller output.  No nausea, vomiting, fevers, trouble breathing.  Daughters state that patient was recently seen at his urologist office and a urine was sent.  Keller was last changed on Friday.  Daughters would like medical management but state no heroic measures.  They would like to make the patient DNR/DNI at this time. Patient admitted to telemetry for workup of AMS and troponinemia        Patient received lying in bed. Awake. Limited mobility. Incontinent of stool. Keller in place. High risk for pressure injury development and progression.    Type of Wound: Friction/ Moisture Associated Skin Damage  Location: Bilateral buttocks  Wound bed: Linear pink partial thickness skin loss to bilateral gluteal folds mimicking each other. Healing MASD to lower right buttock, multiple areas of dry light pink denuded tissue  Wound edges: Irregular  Periwound: Lichenification and maceration  Wound exudate: None  Wound odor: No  Induration, erythema, warmth: No  Wound pain: No    Podiatry following for foot wounds.

## 2025-06-03 NOTE — PROGRESS NOTE ADULT - TIME BILLING
update family - GOC w/ 2nd dtr    complex care coord    complex chart/image/lab/med review    complex care plan    cond a threat to life    poor and guarded prog

## 2025-06-03 NOTE — PROGRESS NOTE ADULT - SUBJECTIVE AND OBJECTIVE BOX
JUNO HEART  89y, Male  Allergy: No Known Allergies      LOS  4d    CHIEF COMPLAINT: AMS (02 Jun 2025 16:59)      INTERVAL EVENTS/HPI  - fever   - T(F): , Max: 101.1 (06-02-25 @ 23:00)  - Tolerating medication  - WBC Count: 12.01 (06-03-25 @ 05:10)  WBC Count: 10.13 (06-02-25 @ 05:47)     - Creatinine: 0.8 (06-03-25 @ 05:10)  Creatinine: 0.9 (06-02-25 @ 05:47)       ROS  ***    VITALS:  T(F): 97.8, Max: 101.1 (06-02-25 @ 23:00)  HR: 67  BP: 168/66  RR: 18Vital Signs Last 24 Hrs  T(C): 36.6 (03 Jun 2025 04:19), Max: 38.4 (02 Jun 2025 23:00)  T(F): 97.8 (03 Jun 2025 04:19), Max: 101.1 (02 Jun 2025 23:00)  HR: 67 (03 Jun 2025 04:19) (67 - 79)  BP: 168/66 (03 Jun 2025 04:19) (158/68 - 174/73)  BP(mean): 100 (03 Jun 2025 04:19) (100 - 100)  RR: 18 (03 Jun 2025 04:19) (18 - 18)  SpO2: 98% (03 Jun 2025 04:19) (97% - 98%)    Parameters below as of 02 Jun 2025 12:50  Patient On (Oxygen Delivery Method): room air        PHYSICAL EXAM:  ***    FH: Non-contributory  Social Hx: Non-contributory    TESTS & MEASUREMENTS:                        9.7    12.01 )-----------( 149      ( 03 Jun 2025 05:10 )             30.5     06-03    142  |  105  |  23[H]  ----------------------------<  230[H]  3.7   |  23  |  0.8    Ca    9.0      03 Jun 2025 05:10  Mg     2.0     06-03          Urinalysis Basic - ( 03 Jun 2025 05:10 )    Color: x / Appearance: x / SG: x / pH: x  Gluc: 230 mg/dL / Ketone: x  / Bili: x / Urobili: x   Blood: x / Protein: x / Nitrite: x   Leuk Esterase: x / RBC: x / WBC x   Sq Epi: x / Non Sq Epi: x / Bacteria: x        Urinalysis with Rflx Culture (collected 05-30-25 @ 17:08)    Culture - Urine (collected 05-30-25 @ 17:08)  Source: Catheterized None  Final Report (06-01-25 @ 16:13):    10,000 - 49,000 CFU/mL Candida albicans "Susceptibilities not performed"    Culture - Blood (collected 05-30-25 @ 16:13)  Source: Blood Blood-Peripheral  Preliminary Report (06-02-25 @ 23:01):    No growth at 72 Hours    Culture - Blood (collected 05-30-25 @ 16:13)  Source: Blood Blood-Peripheral  Preliminary Report (06-02-25 @ 23:01):    No growth at 72 Hours            INFECTIOUS DISEASES TESTING  strept    INFLAMMATORY MARKERS  Sedimentation Rate, Erythrocyte: 127 mm/hr (06-02-25 @ 05:47)  C-Reactive Protein: 88.5 mg/L (06-02-25 @ 05:47)      RADIOLOGY & ADDITIONAL TESTS:  I have personally reviewed the last available Chest xray  CXR      CT      CARDIOLOGY TESTING  12 Lead ECG:   Ventricular Rate 71 BPM    Atrial Rate 63 BPM    P-R Interval 224 ms    QRS Duration 140 ms    Q-T Interval 452 ms    QTC Calculation(Bazett) 491 ms    P Axis 100 degrees    R Axis 71 degrees    T Axis 256 degrees    Diagnosis Line Sinus rhythm with 1st degree A-V block with Premature supraventricular  complexes  atrial-paced complexes  Left ventricular hypertrophy with QRS widening ( Sokolow-Whang , Logan  product , Romhilt-Wilhelm )  Marked T-wave abnormality, consider inferolateral ischemia  Abnormal ECG    Confirmed by Behuria, Supreeti ([id number]) on 5/30/2025 6:05:30 PM (05-30-25 @ 16:00)  12 Lead ECG:   Ventricular Rate 78 BPM    Atrial Rate 78 BPM    P-R Interval 204 ms    QRS Duration 136 ms    Q-T Interval 414 ms    QTC Calculation(Bazett) 471 ms    P Axis 91 degrees    R Axis 70 degrees    T Axis 255 degrees    Diagnosis Line Sinus rhythm withPremature supraventricular complexes  Non-specific intra-ventricular conduction block  Minimal voltage criteria for LVH, may be normal variant ( Sokolow-Hwang )  Marked T-wave abnormality, consider inferolateral ischemia  Abnormal ECG    Confirmed by Behuria, Supreeti ([id number]) on 5/30/2025 6:08:51 PM (05-30-25 @ 14:23)      MEDICATIONS  apixaban 2.5 Oral every 12 hours  aspirin  chewable 81 Oral daily  cefTRIAXone   IVPB 1000 IV Intermittent every 24 hours  chlorhexidine 2% Cloths 1 Topical daily  isosorbide   mononitrate ER Tablet (IMDUR) 60 Oral daily  lactated ringers. 1000 IV Continuous <Continuous>  metoprolol succinate ER 25 Oral daily  polyethylene glycol 3350 17 Oral two times a day  ranolazine 500 Oral two times a day  senna 2 Oral at bedtime  sertraline 50 Oral daily      WEIGHT  Weight (kg): 63.5 (05-30-25 @ 14:20)  Creatinine: 0.8 mg/dL (06-03-25 @ 05:10)      ANTIBIOTICS:  cefTRIAXone   IVPB 1000 milliGRAM(s) IV Intermittent every 24 hours      All available historical records have been reviewed       JUNO HEART  89y, Male  Allergy: No Known Allergies      LOS  4d    CHIEF COMPLAINT: AMS (02 Jun 2025 16:59)      INTERVAL EVENTS/HPI  - fever   - T(F): , Max: 101.1 (06-02-25 @ 23:00), wife at bedside   - Tolerating medication  - WBC Count: 12.01 (06-03-25 @ 05:10)  WBC Count: 10.13 (06-02-25 @ 05:47)     - Creatinine: 0.8 (06-03-25 @ 05:10)  Creatinine: 0.9 (06-02-25 @ 05:47)       ROS  unable to obtain history secondary to patient's mental status and/or sedation     VITALS:  T(F): 97.8, Max: 101.1 (06-02-25 @ 23:00)  HR: 67  BP: 168/66  RR: 18Vital Signs Last 24 Hrs  T(C): 36.6 (03 Jun 2025 04:19), Max: 38.4 (02 Jun 2025 23:00)  T(F): 97.8 (03 Jun 2025 04:19), Max: 101.1 (02 Jun 2025 23:00)  HR: 67 (03 Jun 2025 04:19) (67 - 79)  BP: 168/66 (03 Jun 2025 04:19) (158/68 - 174/73)  BP(mean): 100 (03 Jun 2025 04:19) (100 - 100)  RR: 18 (03 Jun 2025 04:19) (18 - 18)  SpO2: 98% (03 Jun 2025 04:19) (97% - 98%)    Parameters below as of 02 Jun 2025 12:50  Patient On (Oxygen Delivery Method): room air        PHYSICAL EXAM:  Gen: chronically ill appearing   HEENT: Normocephalic, atraumatic  Neck: supple, no lymphadenopathy  CV: Regular rate & regular rhythm  Lungs: decreased BS at bases, no fremitus  Abdomen: Soft, BS present  Ext: Warm, well perfused   with discharge at meatus   Neuro: non focal, not following commands  Skin: no rash, no erythema  Lines: no phlebitis   LE dressings     FH: Non-contributory  Social Hx: Non-contributory    TESTS & MEASUREMENTS:                        9.7    12.01 )-----------( 149      ( 03 Jun 2025 05:10 )             30.5     06-03    142  |  105  |  23[H]  ----------------------------<  230[H]  3.7   |  23  |  0.8    Ca    9.0      03 Jun 2025 05:10  Mg     2.0     06-03          Urinalysis Basic - ( 03 Jun 2025 05:10 )    Color: x / Appearance: x / SG: x / pH: x  Gluc: 230 mg/dL / Ketone: x  / Bili: x / Urobili: x   Blood: x / Protein: x / Nitrite: x   Leuk Esterase: x / RBC: x / WBC x   Sq Epi: x / Non Sq Epi: x / Bacteria: x        Urinalysis with Rflx Culture (collected 05-30-25 @ 17:08)    Culture - Urine (collected 05-30-25 @ 17:08)  Source: Catheterized None  Final Report (06-01-25 @ 16:13):    10,000 - 49,000 CFU/mL Candida albicans "Susceptibilities not performed"    Culture - Blood (collected 05-30-25 @ 16:13)  Source: Blood Blood-Peripheral  Preliminary Report (06-02-25 @ 23:01):    No growth at 72 Hours    Culture - Blood (collected 05-30-25 @ 16:13)  Source: Blood Blood-Peripheral  Preliminary Report (06-02-25 @ 23:01):    No growth at 72 Hours            INFECTIOUS DISEASES TESTING  strept    INFLAMMATORY MARKERS  Sedimentation Rate, Erythrocyte: 127 mm/hr (06-02-25 @ 05:47)  C-Reactive Protein: 88.5 mg/L (06-02-25 @ 05:47)      RADIOLOGY & ADDITIONAL TESTS:  I have personally reviewed the last available Chest xray  CXR      CT      CARDIOLOGY TESTING  12 Lead ECG:   Ventricular Rate 71 BPM    Atrial Rate 63 BPM    P-R Interval 224 ms    QRS Duration 140 ms    Q-T Interval 452 ms    QTC Calculation(Bazett) 491 ms    P Axis 100 degrees    R Axis 71 degrees    T Axis 256 degrees    Diagnosis Line Sinus rhythm with 1st degree A-V block with Premature supraventricular  complexes  atrial-paced complexes  Left ventricular hypertrophy with QRS widening ( Sokolow-Hwang , Logan  product , Romhilt-Wilhelm )  Marked T-wave abnormality, consider inferolateral ischemia  Abnormal ECG    Confirmed by Behuria, Supreeti ([id number]) on 5/30/2025 6:05:30 PM (05-30-25 @ 16:00)  12 Lead ECG:   Ventricular Rate 78 BPM    Atrial Rate 78 BPM    P-R Interval 204 ms    QRS Duration 136 ms    Q-T Interval 414 ms    QTC Calculation(Bazett) 471 ms    P Axis 91 degrees    R Axis 70 degrees    T Axis 255 degrees    Diagnosis Line Sinus rhythm withPremature supraventricular complexes  Non-specific intra-ventricular conduction block  Minimal voltage criteria for LVH, may be normal variant ( Sokolow-Hwang )  Marked T-wave abnormality, consider inferolateral ischemia  Abnormal ECG    Confirmed by Behuria, Supreeti ([id number]) on 5/30/2025 6:08:51 PM (05-30-25 @ 14:23)      MEDICATIONS  apixaban 2.5 Oral every 12 hours  aspirin  chewable 81 Oral daily  cefTRIAXone   IVPB 1000 IV Intermittent every 24 hours  chlorhexidine 2% Cloths 1 Topical daily  isosorbide   mononitrate ER Tablet (IMDUR) 60 Oral daily  lactated ringers. 1000 IV Continuous <Continuous>  metoprolol succinate ER 25 Oral daily  polyethylene glycol 3350 17 Oral two times a day  ranolazine 500 Oral two times a day  senna 2 Oral at bedtime  sertraline 50 Oral daily      WEIGHT  Weight (kg): 63.5 (05-30-25 @ 14:20)  Creatinine: 0.8 mg/dL (06-03-25 @ 05:10)      ANTIBIOTICS:  cefTRIAXone   IVPB 1000 milliGRAM(s) IV Intermittent every 24 hours      All available historical records have been reviewed

## 2025-06-03 NOTE — PROGRESS NOTE ADULT - SUBJECTIVE AND OBJECTIVE BOX
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  Daughter is at bedside.  State for the past few days patient has been more altered than usual.  Patient has decreased p.o. intake and decreased Keller output.  No nausea, vomiting, fevers, trouble breathing.  Daughters state that patient was recently seen at his urologist office and a urine was sent.  Keller was last changed on Friday.  Daughters would like medical management but state no heroic measures.  They would like to make the patient DNR/DNI at this time.    Vital Signs Last 24 Hrs  T(C): 36.3 (30 May 2025 23:20), Max: 37.3 (30 May 2025 15:40)  T(F): 97.4 (30 May 2025 23:20), Max: 99.2 (30 May 2025 15:40)  HR: 72 (30 May 2025 23:20) (67 - 75)  BP: 146/67 (30 May 2025 23:20) (126/79 - 179/97)  BP(mean): --  RR: 18 (30 May 2025 23:20) (15 - 20)  SpO2: 100% (30 May 2025 23:20) (96% - 100%)    Parameters below as of 30 May 2025 23:20  Patient On (Oxygen Delivery Method): room air    CT abdomen pelvis only positive for stool impaction  CT Head showed no intracranial pathology    UA is grossly positive for bacteria, yeasts, and casts    Hgb 9.9 (baseline 10s in 2024), MCV 94.1    Trop 68>63, BNP 2042    Patient admitted to telemetry for workup of AMS and troponinemia (30 May 2025 23:34)    Interval history:  Patient lying in bed. Discussed with daughter.     ITEMS NOT CHECKED ARE NOT PRESENT    SOCIAL HISTORY:   Significant other/partner[ ]  Children[x ]  Confucianist/Spirituality:  Substance hx:  [ ]   Tobacco hx:  [ ]   Alcohol hx: [ ]   Living Situation: [x ]Home  [ ]Long term care  [ ]Rehab [ ]Other  Home Services: [ ] HHA [ ] Visting RN [ ] Hospice  Occupation:  Home Opioid hx:  [ ] Y [x ] N   I-Stop Reference No: [id number]    ADVANCE DIRECTIVES:    [ ] Full Code [x ] DNR  MOLST  [ ]  Living Will  [ ]   DECISION MAKER(s):  [ ] Health Care Proxy(s)  [ ] Surrogate(s)  [ ] Guardian           Name(s): Phone Number(s):    BASELINE (I)ADL(s) (prior to admission):  [place]: [ ]Total  [ ] Moderate [ ]Dependent      Allergies    No Known Allergies    Intolerances    MEDICATIONS  (STANDING):  apixaban 2.5 milliGRAM(s) Oral every 12 hours  aspirin  chewable 81 milliGRAM(s) Oral daily  cefTRIAXone   IVPB 1000 milliGRAM(s) IV Intermittent every 24 hours  chlorhexidine 2% Cloths 1 Application(s) Topical daily  fluconAZOLE   Tablet 200 milliGRAM(s) Oral daily  isosorbide   mononitrate ER Tablet (IMDUR) 60 milliGRAM(s) Oral daily  lactated ringers. 1000 milliLiter(s) (50 mL/Hr) IV Continuous <Continuous>  metoprolol succinate ER 25 milliGRAM(s) Oral daily  polyethylene glycol 3350 17 Gram(s) Oral two times a day  ranolazine 500 milliGRAM(s) Oral two times a day  senna 2 Tablet(s) Oral at bedtime  sertraline 50 milliGRAM(s) Oral daily    MEDICATIONS  (PRN):        PRESENT SYMPTOMS: [x ]Unable to obtain due to poor mentation   Source if other than patient:  [ ]Family   [ ]Team     Pain: [ ]yes [ ]no  QOL impact -   Location -                    Aggravating factors -  Alleviating factors -   Quality -  Radiation -  Timing -   Severity (0-10 scale):  Minimal acceptable level (0-10 scale):     CPOT:    https://www.[facility].org/getattachment/mqm11a68-4y0w-2u9k-9r3t-0770i0592b7o/Critical-Care-Pain-Observation-Tool-(CPOT)    PAIN AD Score: 0  http://geriatrictoolkit.[place].[email or web address]/cog/painad.pdf     Dyspnea:                           [ ]None[ ]Mild [ ]Moderate [ ]Severe     Respiratory Distress Observation Scale (RDOS): 0  A score of 0 to 2 signifies little or no respiratory distress, 3 signifies mild distress, scores 4 to 6 indicate moderate distress, and scores greater than 7 signify severe distress  [email or web address]/sites/default/files/[email or web address]-xzwzdyqzjqq-yshrplxz-[email or web address]-pfsyf.[email or web address]    Anxiety:                             [ ]None[ ]Mild [ ]Moderate [ ]Severe   Fatigue:                             [ ]None[ ]Mild [ ]Moderate [ ]Severe   Nausea:                             [ ]None[ ]Mild [ ]Moderate [ ]Severe   Loss of appetite:              [ ]None[ ]Mild [ ]Moderate [ ]Severe   Constipation:                    [ ]None[ ]Mild [ ]Moderate [ ]Severe    Other Symptoms:  [ ]All other review of systems negative     PHYSICAL EXAM:    GENERAL:  NAD   PULMONARY:  Non labored breathing  NEUROLOGIC: Lying in bed  BEHAVIORAL/PSYCH:  Calm    Palliative Performance Status Version 2:       20  %    http://[email or web address].org/files/news/palliative_performance_scale_[email or web address].pdf    LABS: I have reviewed daily labs, including                                   9.7    12.01 )-----------( 149      ( 03 Jun 2025 05:10 )             30.5       06-03    142  |  105  |  23[H]  ----------------------------<  230[H]  3.7   |  23  |  0.8    Ca    9.0      03 Jun 2025 05:10  Mg     2.0     06-03            PT/INR - ( 02 Jun 2025 05:47 )   PT: 13.80 sec;   INR: 1.17 ratio         PTT - ( 02 Jun 2025 05:47 )  PTT:31.0 sec        RADIOLOGY & ADDITIONAL STUDIES: I have independently reviewed new imaging, including    CTAP 5/30: Stool impaction of the rectum.    Otherwise, no acute inflammatory pathology within the abdomen or pelvis    PROTEIN CALORIE MALNUTRITION PRESENT: [ ]mild [ ]moderate [ ]severe [ ]underweight [ ]morbid obesity  https://www.andeal.org/vault/2440/web/files/ONC/Table_Clinical%20Characteristics%20to%20Document%20Malnutrition-White%20JV%20et%20al%202012.pdf    Height (cm): 182.9 (06-01-25 @ 21:09), 182.9 (12-09-24 @ 21:44), 182.9 (12-09-24 @ 00:38)  Weight (kg): 63.5 (05-30-25 @ 14:20), 68 (12-09-24 @ 21:44), 68 (12-09-24 @ 00:38)  BMI (kg/m2): 19 (06-01-25 @ 21:09), 19 (05-30-25 @ 14:20), 20.3 (12-09-24 @ 21:44)    [ ]PPSV2 < or = to 30% [ ]significant weight loss  [ ]poor nutritional intake  [ ]anasarca      [ ]Artificial Nutrition      Palliative Care Spiritual/Emotional Screening Tool Question  Severity (0-4):                    OR                    [ x] Unable to determine. Will assess at later time if appropriate.  Score of 2 or greater indicates recommendation of Chaplaincy and/or SW referral  Chaplaincy Referral: [ ] Yes [ ] Refused [ ] Following     Caregiver [place]:  [ ] Yes [ ] No    OR    [x ] Unable to determine. Will assess at later time if appropriate.  Social Work Referral [ ]  Patient and Family Centered Care Referral [ ]    Anticipatory Grief Present: [ ] Yes [ ] No    OR     [ x] Unable to determine. Will assess at later time if appropriate.  Social Work Referral [ ]  Patient and Family Centered Care Referral [ ]    REFERRALS:   [ ]Chaplaincy  [ ]Hospice  [ ]Child Life  [ ]Social Work  [ ]Case management [ ]Holistic Therapy     Palliative care education provided to patient and/or family

## 2025-06-04 NOTE — DISCHARGE NOTE PROVIDER - HOSPITAL COURSE
89-year-old male past medical history of cad/cabg, dvt on elliquis, s/p ppm, htn, hl, indwelling keller presenting to the ED for AMS.  Daughter is at bedside.  State for the past few days patient has been more altered than usual.  Patient has decreased p.o. intake and decreased Keller output.  No nausea, vomiting, fevers, trouble breathing.  Daughters state that patient was recently seen at his urologist office and a urine was sent.  Keller was last changed on Friday 5/23.  Daughters requested  medical management but state no heroic measures and made patient DNR/DNI.  In the ED: T 37,3, HR 72, /67, RR 18 SpO2 100%  Hgb 9.9 (baseline 10s in 2024), MCV 94.1  UA is grossly positive for bacteria, yeasts, and casts  Trop 68>63, BNP 2042  CT abdomen pelvis only positive for stool impaction  CT Head showed no intracranial pathology    Patient admitted to telemetry for workup of AMS 2/2 metabolic encephalopathy from suspected UTI and troponinemia.    #family interested in hospice at [facility]  - [facility] discussed on admission and patient was amde DNR/DNI  - Family interested in hospice care at this time  - added Morphine Conc Liq 5mg SL q4 prn pain or agitation    # AMS 2/2 Metab enceph 2/2 UTI 2/2 chr keller, in background of very severe dementia  - per daughter Toña, pt has been having cognitive decline with behavioral changes (loss of appetite and activity) over the past several months  pt does not walk at all for several months, he is completely dependent for all ADLs, including feeding, only speaks a couple of words now  he has almost no interaction w/ anyone at home  chronic keller changes last Friday 5/23 at urology office  positive UA on admission  CT A/P: stool impaction, no hydro  CTH: no acute findings  blood cx: neg  urine culture 5/30: candida albicans  started on Diflucan 400mg po x1, then 200mg po q24 x6 (1 wk tx total - end 6/9)    # 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; chr OM; PAD  s/p podiatry eval, no plan for surgical intervention  /CRP 88.5  LE arterial doppler - mod sten Rt SFA; Lt PT Art Occ - family does NOT want ANY vasc intervention  R foot xray: degen changes; vasc calcif; s/p 1st toe amp; cystic change/erosion deep to ST swelling at medial 1st MT head (r/o OM)  palliative abx: rocephin 1gm iv q24 + doxy 100mg po q12 (might need to iv if not swallowing well)  ID recommended to change to PO levaquin 750mg daily + PO doxy 100mg BID x 21 days E/D 6/19, Pending clinical course may need to prolong  local wound care to rt foot    # constipation  CT A/P: stool impaction noted  bowel reg: miralax q12 and senna 2 qhs  added dulcolax suppository    # Failure to thrive  hospice eval  family does not want NGT or PEG    # Troponinemia (resolving); CAD s/p CABG; Hx DVT on Eliquis; Hx of HTN/HLD; Hx pacemaker  - Trop 68 -> 63  - On HIE echo in 2022: EF 71%, Grade II diastolic dysfunction  - C/w aspirin 81mg qD  - C/w Eliquis 2.5mg BID   - dc Imdur, Ranexa  - on Toprol XL 25mg po q24, switched to metoprolol tartrate 12.5 mg BID as patient taking crushed meds now  above can be altered once on hospice at SNF  per EP, can follow up outpatient for routine monitoring of PPM    # DM2  diabetic dash diet  monitoring FS, can dc if patient going to hospice    # BPH  off finasteride  has keller - chronic (changed on this admit)    # Anxiety/Depression  c/w Zoloft 50 q24    Discussion of discharge plan of care, including discharge diagnoses, medication reconciliation, and follow-ups was conducted with Dr. Slaughter on 6/4/2025 and discharge was approved.

## 2025-06-04 NOTE — DISCHARGE NOTE PROVIDER - DETAILS OF MALNUTRITION DIAGNOSIS/DIAGNOSES
This patient has been assessed with a concern for Malnutrition and was treated during this hospitalization for the following Nutrition diagnosis/diagnoses:     -  06/01/2025: Severe protein-calorie malnutrition

## 2025-06-04 NOTE — PHYSICAL THERAPY INITIAL EVALUATION ADULT - PERTINENT HX OF CURRENT PROBLEM, REHAB EVAL
Pt. attempted to be seen for PT IE this AM. Pt. encountered lethargic, moaning, arousable to name with encouragement. Pt.'s BUE and BLE joints assessed, all limited by >50% due to contractures. Pt. is not appripriate for acute PT services at this time. Please re-consult if necessary.

## 2025-06-04 NOTE — DISCHARGE NOTE PROVIDER - NSDCCPCAREPLAN_GEN_ALL_CORE_FT
PRINCIPAL DISCHARGE DIAGNOSIS  Diagnosis: AMS (altered mental status)  Assessment and Plan of Treatment: You presented to the hospital due to decreased oral intake with altered mental status. You were noted to have a positive urinalysis for which you were initially started on intravenous antibiotics. Later on, the culture results grew a yeast for which you were started on a medication called fluconazole to treat that infection.  Also, you were noted to have suspected infection at the site of prior amputation of the first toe for which you were evaluted by podiatry and infectious disease specialists. You were started on antibiotics to cover for any underlying infection and will have to continue them until 6/19.  Goals of care discussion were held with palliative care, and given the advanced dementia and overall decline with guarded prognosis, your code status was made DNR/DNI in addition to hospice referral.      SECONDARY DISCHARGE DIAGNOSES  Diagnosis: Elevated troponin level  Assessment and Plan of Treatment:     Diagnosis: Acute UTI  Assessment and Plan of Treatment:

## 2025-06-04 NOTE — PROGRESS NOTE ADULT - SUBJECTIVE AND OBJECTIVE BOX
JUNO [employer]  89y  Male  ***My note supersedes ALL resident notes that I sign.  My corrections for their notes are in my note.***    I can be reached directly via Teams or my office number is [phone] or [phone].    INTERVAL EVENTS: Here for f/u of dementia. Pt is more awake. Has pain w/ moving either leg. Pt eats/drinks slightly w/ family. Overall, still in poor condition. RN says pt can take oral meds. Still no BM.    T(F): 98.8 (06-04-25 @ 13:02), Max: 98.9 (06-03-25 @ 19:05)  HR: 80 (06-04-25 @ 13:02) (66 - 80)  BP: 169/78 (06-04-25 @ 13:02) (169/78 - 175/75)  RR: 18 (06-04-25 @ 04:39) (18 - 18)  SpO2: 98% (06-04-25 @ 13:02) (95% - 98%)    Gen: NAD; not interactive; looks debilitated   HEENT: PERRL, nose clr  Neck: no nodes, no JVD, thyroid nl  lungs: clr  hrt: s1 s2 rrr no murmur  abd: soft, NT/ND, no HS megaly  ext: no edema, no c/c; rt leg in full flex contracture at knee (cannot extend leg); + pain w/ moving either leg  neuro: somewhat awake; not alert, not oriented, can move arms a little;     LABS:                      9.6     (    93.9   10.96 )-----------( ---------      152      ( 04 Jun 2025 06:00 )             30.6    (    15.7     WBC Count: 10.96 K/uL (06-04-25 @ 06:00) - slight decr  WBC Count: 12.01 K/uL (06-03-25 @ 05:10)  WBC Count: 10.13 K/uL (06-02-25 @ 05:47)  WBC Count: 9.05 K/uL (06-01-25 @ 11:49)  WBC Count: 8.38 K/uL (06-01-25 @ 04:30)  WBC Count: 9.72 K/uL (05-30-25 @ 16:13)    144   (   107   (   247      06-04-25 @ 06:00  ----------------------               3.9   (   24   (   23                             -----                        0.7  Ca  9.1   Mg  2.0    P   --     Urinalysis Basic - ( 04 Jun 2025 06:00 )    Color: x / Appearance: x / SG: x / pH: x  Gluc: 247 mg/dL / Ketone: x  / Bili: x / Urobili: x   Blood: x / Protein: x / Nitrite: x   Leuk Esterase: x / RBC: x / WBC x   Sq Epi: x / Non Sq Epi: x / Bacteria: x    RADIOLOGY & ADDITIONAL TESTS:    MEDICATIONS:  cefTRIAXone   IVPB 1000 milliGRAM(s) IV Intermittent every 24 hours  doxycycline monohydrate Capsule 100 milliGRAM(s) Oral every 12 hours  fluconAZOLE   Tablet 200 milliGRAM(s) Oral daily    apixaban 2.5 milliGRAM(s) Oral every 12 hours  aspirin  chewable 81 milliGRAM(s) Oral daily  bisacodyl Suppository 10 milliGRAM(s) Rectal daily PRN  chlorhexidine 2% Cloths 1 Application(s) Topical daily  lactated ringers. 1000 milliLiter(s) IV Continuous <Continuous>  metoprolol tartrate 12.5 milliGRAM(s) Oral every 12 hours  morphine Concentrate 5 milliGRAM(s) SubLingual every 4 hours PRN  senna 2 Tablet(s) Oral at bedtime  sertraline 50 milliGRAM(s) Oral daily

## 2025-06-04 NOTE — PROGRESS NOTE ADULT - ASSESSMENT
89-year-old man past medical history of cad/cabg, dvt on elliquis, s/p ppm, htn, hl, indwelling keller presenting to the ED for AMS.     # pt is immunocompromised 2/2 age; CAD    # family interested in hospice at SNF ([facility] or [facility])  hospice eval -> d/c planning  limit labs and studies - family agrees  d/c IVFs  c/w Morphine Conc Liq 5mg SL q4 prn pain or agitation and pre-med for daily care or BM care    # AMS 2/2 Metab enceph 2/2 UTI 2/2 chr keller, in background of very severe dementia w/ functional quadriplegia  - per daughter Toña, pt has been having cognitive decline with behavioral changes (loss of appetite and activity) over the past several months  pt does not walk at all for several mo  he is completely dependent for all ADLs, including feeding  he only speaks a couple of words now  he has almost no interaction w/ anyone at home  chronic keller changed Friday 5/23 at urology office and changed on this admit  positive UA on admission  CT A/P: stool impaction, no hydro  CTH: no acute findings  blood cx: neg  urine culture: candida  afng: Diflucan 400mg po x1, then 200mg po q24 x6 (1 wk tx total - end 6/9)    # 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; chr OM; PAD  podiatry eval noted  /CRP 88.5  LE arterial doppler - mod sten Rt SFA; Lt PT Art Occ - family does NOT want ANY vasc intervention  R foot xray: degen changes; vasc calcif; s/p 1st toe amp; cystic change/erosion deep to ST swelling at medial 1st MT head (r/o OM)  ID f/u  palliative abx: rocephin 1gm iv q24 + doxy 100mg po q12 (might need to iv if not swallowing well) - d/w ID re duration (and converting rocephin to oral agent)  no MRI needed (going to hospice)  local wound care to rt foot    # constipation  CT A/P: stool impaction noted  bowel reg: senna 2 qhs (d/c miralax - cannot anne that amt of liquid)  dulcolax supp pr q24 prn  try mineral oil enema x1    # Failure to thrive  hospice eval  family does not want NGT or PEG    # Troponinemia (resolving); CAD s/p CABG; Hx DVT on Eliquis; Hx of HTN/HLD; Hx pacemaker  - Trop 68 -> 63  - On HIE echo in 2022: EF 71%, Grade II diastolic dysfunction  - C/w aspirin 81mg qD  - C/w Eliquis 2.5mg BID   d/c Imdur & Ranexa (cannot be crushed)  change Toprol XL to metoprolol tart 12.5mg po q12  above can be altered once on hospice at SNF  per EP, can follow up outpatient for routine monitoring of PPM    # DM2  diabetic dash diet  can d/c FS since going on hospice    # BPH  d/c finasteride  has keller - chronic (changed on this admit)    # Anxiety/Depression  c/w Zoloft 50 q24    # DVT ppx: Eliquis    # Activity: pt does NOT walk at baseline w/ rt leg contracture; no PT eval    # DNR/DNI    # updated dtr at bedside     Dispo: f/u ID re abx/afng; f/u Pod prn re DFU; hospice eval; d/c FS/labs/studies; c/w keller; tx BM;   eventually, pt will go on hospice to SNF ([facility]) - f/u CM/hospice - since going on hospice, can d/c when bed avail    Long term prog is extremely poor. Life expectancy is < 6 mo. 89-year-old man past medical history of cad/cabg, dvt on elliquis, s/p ppm, htn, hl, indwelling keller presenting to the ED for AMS.     # pt is immunocompromised 2/2 age; CAD    # family interested in hospice at SNF ([facility] or [facility])  hospice eval -> d/c planning  limit labs and studies - family agrees  d/c IVFs  c/w Morphine Conc Liq 5mg SL q4 prn pain or agitation and pre-med for daily care or BM care    # AMS 2/2 Metab enceph 2/2 UTI 2/2 chr keller, in background of very severe dementia w/ functional quadriplegia  - per daughter Toña, pt has been having cognitive decline with behavioral changes (loss of appetite and activity) over the past several months  pt does not walk at all for several mo  he is completely dependent for all ADLs, including feeding  he only speaks a couple of words now  he has almost no interaction w/ anyone at home  chronic keller changed Friday 5/23 at urology office and changed on this admit  positive UA on admission  CT A/P: stool impaction, no hydro  CTH: no acute findings  blood cx: neg  urine culture: candida  afng: Diflucan 400mg po x1, then 200mg po q24 x6 (1 wk tx total - end 6/9)    # 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; chr OM; PAD  podiatry eval noted  /CRP 88.5  LE arterial doppler - mod sten Rt SFA; Lt PT Art Occ - family does NOT want ANY vasc intervention  R foot xray: degen changes; vasc calcif; s/p 1st toe amp; cystic change/erosion deep to ST swelling at medial 1st MT head (r/o OM)  ID f/u  palliative abx: d/c rocpehin; use levoflox 750mg po q24 + doxy 100mg po q12 til 6/19  no MRI needed (going to hospice)  local wound care to rt foot    # constipation  CT A/P: stool impaction noted  bowel reg: senna 2 qhs (d/c miralax - cannot anne that amt of liquid)  dulcolax supp pr q24 prn  try mineral oil enema x1    # Failure to thrive  hospice eval  family does not want NGT or PEG    # Troponinemia (resolving); CAD s/p CABG; Hx DVT on Eliquis; Hx of HTN/HLD; Hx pacemaker  - Trop 68 -> 63  - On HIE echo in 2022: EF 71%, Grade II diastolic dysfunction  - C/w aspirin 81mg qD  - C/w Eliquis 2.5mg BID   d/c Imdur & Ranexa (cannot be crushed)  change Toprol XL to metoprolol tart 12.5mg po q12  above can be altered once on hospice at SNF  per EP, can follow up outpatient for routine monitoring of PPM    # DM2  diabetic dash diet  can d/c FS since going on hospice    # BPH  d/c finasteride  has keller - chronic (changed on this admit)    # Anxiety/Depression  c/w Zoloft 50 q24    # DVT ppx: Eliquis    # Activity: pt does NOT walk at baseline w/ rt leg contracture; no PT eval    # DNR/DNI    # updated dtr at bedside     Dispo: f/u ID re abx/afng; f/u Pod prn re DFU; hospice eval; d/c FS/labs/studies; c/w keller; tx BM;   eventually, pt will go on hospice to SNF ([facility]) - f/u CM/hospice - since going on hospice, can d/c when bed avail    Long term prog is extremely poor. Life expectancy is < 6 mo.

## 2025-06-04 NOTE — CDI QUERY NOTE - NSCDIOTHERTXTBX_GEN_ALL_CORE_HH
Clinical documentation and/or evidence in the medical record indicates that this patient has functional status limitations.  In order to ensure accurate coding and accuracy of the clinical record, the documentation in this patient’s medical record requires additional clarification.      Please include more specific documentation regarding the patient’s functional status in your progress note and/or discharge summary.    Please clarify if patient's functional status can be further specified as:  • Patient requiring total assistance with all ADLs is associated with functional quadriplegia  • Patient requiring total assistance with all ADLs is associated with complete immobility due to frailty  • Other (specify)    Supporting documentation and/or clinical evidence:   	  5/31- 6/4 Adult Assessment and Intervention:  Type of Assessment: Daily assessment  Turning from your back to your side while in a flat bed without using bedrails?: 1 = Total assistance  Moving from lying on your back to sitting on the flat side of a flat bed without using bedrails?: 1 = Total assistance  Moving to and from a bed to a chair (including a wheelchair)?: 1 = Total assistance  Standing up from a chair using your arms (e.g. wheelchair or bedside chair)?: 1 = Total assistance  Walking in hospital room?: 1 = Total assistance  Putting on and taking off regular lower body clothing?: 1 = Total assistance  Bathing (including washing, rinsing, drying)?: 1 = Total assistance  Toileting, which includes using toilet, bedpan or urinal?: 1 = Total assistance  Putting on and taking off regular upper body clothing?: 1 = Total assistance  Take care of personal grooming such as brushing teeth?: 1 = Total assistance  Eating meals?: 1 = Total assistance    Discharge Provider Note: - per daughter Toña, pt has been having cognitive decline with behavioral changes (loss of appetite and activity) over the past several months  pt does not walk at all for several months, he is completely dependent for all ADLs, including feeding, only speaks a couple of words now  he has almost no interaction w/ anyone at home    Thank you,  Mary Hart RN, BSN, CCDS  [phone]/ TEAMS (preferred)

## 2025-06-04 NOTE — PROGRESS NOTE ADULT - TIME BILLING
I have personally seen and examined this patient.  I have reviewed all pertinent clinical information and reviewed all relevant imaging and diagnostic studies personally.   I counseled the patient about diagnostic testing and treatment plan.   I discussed my recommendations with the primary team Nicko

## 2025-06-04 NOTE — PROGRESS NOTE ADULT - ASSESSMENT
ASSESSMENT  89-year-old male past medical history of cad/cabg, AVR, dvt on elliquis, s/p ppm, htn, hl, indwelling keller presenting to the ED for AMS.     IMPRESSION  #Fever  #Suspected chronic osteomyelitis R foot    MRSA PCR Result.: Negative (06-03-25 @ 14:40)  -RLE: Wound on Right Hallux Amputation site- PTB, adrianna-wound eschar demarcation and maceration and fibrotic tissue, gangrene demarcation on lateral dorsal surface of fifth digit, ulcer of hindfoot stable, not showing signs of active drainage, adrianna-wound erythema,   -LLE: Wound on Left Heel, eschar tissue, superficial drainage, adrianna-wound erythema, no signs of active purulence  < from: Xray Foot AP + Lateral + Oblique, Right (06.01.25 @ 13:01) >  Post amputation of the first toe, with soft tissue swelling at the   amputation bed. Cystic change/erosion deep to the area of soft tissue   swelling at the medial aspect of the first metatarsal head. Osteomyelitis   is not excluded. Recommend MRI of the right forefoot for further   evaluatio  #Rule out complicated cystitis     Admission WBC 9 ; Sepsis ruled out on admission   Chronic keller ; UCX   10,000 - 49,000 CFU/mL Candida albicans "Susceptibilities not performed"-     5/30 BCX NGTD     UA pyuria    CT abdomen pelvis only positive for stool impaction  #PPM / AVR  #Immunodeficiency secondary to Senescence  which could result in poor clinical outcomes  #Abx allergy: No Known Allergies    Creatinine: 0.9 (05-30-25 @ 16:13) 56 mL/min  Creatinine clearance, original Cockcroft-Gault    Height (cm): 182.9 (05-30-25 @ 14:20)  Weight (kg): 63.5 (05-30-25 @ 14:20)    RECOMMENDATIONS  - no further fever, f/u BCX  - Fluconazole PO 400mg x1 then 200mg x 6 days E/D 6/9   - Appreciate Podiatry consult  - Suspect chronic osteomyelitis -   - D/C IV ceftriaxone, Change to PO levaquin 750mg daily + PO doxy 100mg BID x 21 days E/D 6/19, Pending clinical course may need to prolong  - Fluc/levaquin are QTC prolonged, patient has a PPM     If any questions, please send a message or call on [employer] Teams  Please continue to update ID with any pertinent new laboratory, radiographic findings, or change in clinical status

## 2025-06-04 NOTE — DISCHARGE NOTE PROVIDER - INSTRUCTIONS
Glucerna Shake Cans or Servings Per Day:  1       Frequency:  Daily  Ensure Max Cans or Servings Per Day:  1       Frequency:  Daily

## 2025-06-04 NOTE — DISCHARGE NOTE PROVIDER - NSDCMRMEDTOKEN_GEN_ALL_CORE_FT
aspirin 81 mg oral tablet: orally once a day  doxycycline monohydrate 50 mg oral capsule: 2 cap(s) orally every 12 hours Take 1 tab twice a day until 6/19 at night then sotp  Eliquis 2.5 mg oral tablet: 1 tab(s) orally every 12 hours MDD: 2  fluconazole 200 mg oral tablet: 1 tab(s) orally once a day Take 1 tab once a day until 6/9 then stop.  Janumet 50 mg-1000 mg oral tablet: 1 tab(s) orally 2 times a day  Metoprolol Tartrate 25 mg oral tablet: 0.5 tab(s) orally 2 times a day Take half tab twice a day  pioglitazone 30 mg oral tablet: 1 tab(s) orally once a day  sertraline 50 mg oral tablet: 1 tab(s) orally once a day

## 2025-06-04 NOTE — DISCHARGE NOTE PROVIDER - NSDCFUADDAPPT_GEN_ALL_CORE_FT
Do you need a primary care doctor or follow-up with a specialist? Our care coordinators will help you find providers near you and schedule any follow-up care visits.    Monday-Friday: 9am-5pm    Call our [facility] team: [phone]

## 2025-06-04 NOTE — DISCHARGE NOTE PROVIDER - NSDCFUSCHEDAPPT_GEN_ALL_CORE_FT
[facility] Physician [facility]  CARDIOLOGY [id number] [facility]  Scheduled Appointment: 06/26/2025

## 2025-06-04 NOTE — SWALLOW BEDSIDE ASSESSMENT ADULT - SLP GENERAL OBSERVATIONS
awake, lethargic, non verbal. dtr at bedside feeding puree foods. room air
Received at bedside, obtunded, room air. Not responsive to sternal rub/verbal cues.

## 2025-06-04 NOTE — PROGRESS NOTE ADULT - SUBJECTIVE AND OBJECTIVE BOX
JUNO HEART  89y, Male  Allergy: No Known Allergies      LOS  5d    CHIEF COMPLAINT: AMS (03 Jun 2025 15:49)      INTERVAL EVENTS/HPI  - T(F): , Max: 98.9 (06-03-25 @ 19:05) Afebrile   - WBC Count: 10.96 (06-04-25 @ 06:00)  WBC Count: 12.01 (06-03-25 @ 05:10)     - Creatinine: 0.7 (06-04-25 @ 06:00)  Creatinine: 0.8 (06-03-25 @ 05:10)     -   -   -     ROS  cannot obtain secondary to patient's sedation and/or mental status    VITALS:  T(F): 98.7, Max: 98.9 (06-03-25 @ 19:05)  HR: 66  BP: 174/87  RR: 18Vital Signs Last 24 Hrs  T(C): 37.1 (04 Jun 2025 04:39), Max: 37.2 (03 Jun 2025 19:05)  T(F): 98.7 (04 Jun 2025 04:39), Max: 98.9 (03 Jun 2025 19:05)  HR: 66 (04 Jun 2025 07:57) (66 - 79)  BP: 174/87 (04 Jun 2025 07:57) (157/76 - 175/75)  BP(mean): --  RR: 18 (04 Jun 2025 04:39) (18 - 18)  SpO2: 95% (04 Jun 2025 04:39) (95% - 98%)    Parameters below as of 04 Jun 2025 04:39  Patient On (Oxygen Delivery Method): room air        PHYSICAL EXAM:  ***    FH: Non-contributory  Social Hx: Non-contributory    TESTS & MEASUREMENTS:                        9.6    10.96 )-----------( 152      ( 04 Jun 2025 06:00 )             30.6     06-04    144  |  107  |  23[H]  ----------------------------<  247[H]  3.9   |  24  |  0.7    Ca    9.1      04 Jun 2025 06:00  Mg     2.0     06-04          Urinalysis Basic - ( 04 Jun 2025 06:00 )    Color: x / Appearance: x / SG: x / pH: x  Gluc: 247 mg/dL / Ketone: x  / Bili: x / Urobili: x   Blood: x / Protein: x / Nitrite: x   Leuk Esterase: x / RBC: x / WBC x   Sq Epi: x / Non Sq Epi: x / Bacteria: x        Urinalysis with Rflx Culture (collected 05-30-25 @ 17:08)    Culture - Urine (collected 05-30-25 @ 17:08)  Source: Catheterized None  Final Report (06-01-25 @ 16:13):    10,000 - 49,000 CFU/mL Candida albicans "Susceptibilities not performed"    Culture - Blood (collected 05-30-25 @ 16:13)  Source: Blood Blood-Peripheral  Preliminary Report (06-03-25 @ 23:01):    No growth at 4 days    Culture - Blood (collected 05-30-25 @ 16:13)  Source: Blood Blood-Peripheral  Preliminary Report (06-03-25 @ 23:01):    No growth at 4 days            INFECTIOUS DISEASES TESTING  MRSA PCR Result.: Negative (06-03-25 @ 14:40)      INFLAMMATORY MARKERS  Sedimentation Rate, Erythrocyte: 127 mm/hr (06-02-25 @ 05:47)  C-Reactive Protein: 88.5 mg/L (06-02-25 @ 05:47)      RADIOLOGY & ADDITIONAL TESTS:  I have personally reviewed the last available Chest xray  CXR      CT      CARDIOLOGY TESTING  12 Lead ECG:   Ventricular Rate 71 BPM    Atrial Rate 63 BPM    P-R Interval 224 ms    QRS Duration 140 ms    Q-T Interval 452 ms    QTC Calculation(Bazett) 491 ms    P Axis 100 degrees    R Axis 71 degrees    T Axis 256 degrees    Diagnosis Line Sinus rhythm with 1st degree A-V block with Premature supraventricular  complexes  atrial-paced complexes  Left ventricular hypertrophy with QRS widening ( Sokolow-Hwang , Hilger  product , Romhilt-Wilhelm )  Marked T-wave abnormality, consider inferolateral ischemia  Abnormal ECG    Confirmed by Behuria, Supreeti ([id number]) on 5/30/2025 6:05:30 PM (05-30-25 @ 16:00)  12 Lead ECG:   Ventricular Rate 78 BPM    Atrial Rate 78 BPM    P-R Interval 204 ms    QRS Duration 136 ms    Q-T Interval 414 ms    QTC Calculation(Bazett) 471 ms    P Axis 91 degrees    R Axis 70 degrees    T Axis 255 degrees    Diagnosis Line Sinus rhythm withPremature supraventricular complexes  Non-specific intra-ventricular conduction block  Minimal voltage criteria for LVH, may be normal variant ( Sokolow-Hwang )  Marked T-wave abnormality, consider inferolateral ischemia  Abnormal ECG    Confirmed by Behuria, Supreeti ([id number]) on 5/30/2025 6:08:51 PM (05-30-25 @ 14:23)      MEDICATIONS  apixaban 2.5  aspirin  chewable 81  bisacodyl Suppository 10  cefTRIAXone   IVPB 1000  chlorhexidine 2% Cloths 1  doxycycline monohydrate Capsule 100  fluconAZOLE   Tablet 200  lactated ringers. 1000  metoprolol tartrate 12.5  senna 2  sertraline 50      WEIGHT  Weight (kg): 63.5 (05-30-25 @ 14:20)  Creatinine: 0.7 mg/dL (06-04-25 @ 06:00)      ANTIBIOTICS:  cefTRIAXone   IVPB 1000 milliGRAM(s) IV Intermittent every 24 hours  doxycycline monohydrate Capsule 100 milliGRAM(s) Oral every 12 hours  fluconAZOLE   Tablet 200 milliGRAM(s) Oral daily      All available historical records have been reviewed   JUNO HEART  89y, Male  Allergy: No Known Allergies      LOS  5d    CHIEF COMPLAINT: AMS (03 Jun 2025 15:49)      INTERVAL EVENTS/HPI  - T(F): , Max: 98.9 (06-03-25 @ 19:05) Afebrile   - WBC Count: 10.96 (06-04-25 @ 06:00)  WBC Count: 12.01 (06-03-25 @ 05:10)     - Creatinine: 0.7 (06-04-25 @ 06:00)  Creatinine: 0.8 (06-03-25 @ 05:10)     -   -   -     ROS  cannot obtain secondary to patient's sedation and/or mental status    VITALS:  T(F): 98.7, Max: 98.9 (06-03-25 @ 19:05)  HR: 66  BP: 174/87  RR: 18Vital Signs Last 24 Hrs  T(C): 37.1 (04 Jun 2025 04:39), Max: 37.2 (03 Jun 2025 19:05)  T(F): 98.7 (04 Jun 2025 04:39), Max: 98.9 (03 Jun 2025 19:05)  HR: 66 (04 Jun 2025 07:57) (66 - 79)  BP: 174/87 (04 Jun 2025 07:57) (157/76 - 175/75)  BP(mean): --  RR: 18 (04 Jun 2025 04:39) (18 - 18)  SpO2: 95% (04 Jun 2025 04:39) (95% - 98%)    Parameters below as of 04 Jun 2025 04:39  Patient On (Oxygen Delivery Method): room air        PHYSICAL EXAM:  Gen: chronically ill appearing   HEENT: Normocephalic, atraumatic  Neck: supple, no lymphadenopathy  CV: Regular rate & regular rhythm  Lungs: decreased BS at bases, no fremitus  Abdomen: Soft, BS present  Ext: Warm, well perfused  Neuro: non focal, not following commands  Skin: no rash, no erythema  Lines: no phlebitis   LE dressings     FH: Non-contributory  Social Hx: Non-contributory    TESTS & MEASUREMENTS:                        9.6    10.96 )-----------( 152      ( 04 Jun 2025 06:00 )             30.6     06-04    144  |  107  |  23[H]  ----------------------------<  247[H]  3.9   |  24  |  0.7    Ca    9.1      04 Jun 2025 06:00  Mg     2.0     06-04          Urinalysis Basic - ( 04 Jun 2025 06:00 )    Color: x / Appearance: x / SG: x / pH: x  Gluc: 247 mg/dL / Ketone: x  / Bili: x / Urobili: x   Blood: x / Protein: x / Nitrite: x   Leuk Esterase: x / RBC: x / WBC x   Sq Epi: x / Non Sq Epi: x / Bacteria: x        Urinalysis with Rflx Culture (collected 05-30-25 @ 17:08)    Culture - Urine (collected 05-30-25 @ 17:08)  Source: Catheterized None  Final Report (06-01-25 @ 16:13):    10,000 - 49,000 CFU/mL Candida albicans "Susceptibilities not performed"    Culture - Blood (collected 05-30-25 @ 16:13)  Source: Blood Blood-Peripheral  Preliminary Report (06-03-25 @ 23:01):    No growth at 4 days    Culture - Blood (collected 05-30-25 @ 16:13)  Source: Blood Blood-Peripheral  Preliminary Report (06-03-25 @ 23:01):    No growth at 4 days            INFECTIOUS DISEASES TESTING  MRSA PCR Result.: Negative (06-03-25 @ 14:40)      INFLAMMATORY MARKERS  Sedimentation Rate, Erythrocyte: 127 mm/hr (06-02-25 @ 05:47)  C-Reactive Protein: 88.5 mg/L (06-02-25 @ 05:47)      RADIOLOGY & ADDITIONAL TESTS:  I have personally reviewed the last available Chest xray  CXR      CT      CARDIOLOGY TESTING  12 Lead ECG:   Ventricular Rate 71 BPM    Atrial Rate 63 BPM    P-R Interval 224 ms    QRS Duration 140 ms    Q-T Interval 452 ms    QTC Calculation(Bazett) 491 ms    P Axis 100 degrees    R Axis 71 degrees    T Axis 256 degrees    Diagnosis Line Sinus rhythm with 1st degree A-V block with Premature supraventricular  complexes  atrial-paced complexes  Left ventricular hypertrophy with QRS widening ( Sokolow-Hwang , Logan  product , Romhilt-Wilhelm )  Marked T-wave abnormality, consider inferolateral ischemia  Abnormal ECG    Confirmed by Behuria, Supreeti ([id number]) on 5/30/2025 6:05:30 PM (05-30-25 @ 16:00)  12 Lead ECG:   Ventricular Rate 78 BPM    Atrial Rate 78 BPM    P-R Interval 204 ms    QRS Duration 136 ms    Q-T Interval 414 ms    QTC Calculation(Bazett) 471 ms    P Axis 91 degrees    R Axis 70 degrees    T Axis 255 degrees    Diagnosis Line Sinus rhythm withPremature supraventricular complexes  Non-specific intra-ventricular conduction block  Minimal voltage criteria for LVH, may be normal variant ( Sokolow-Hwang )  Marked T-wave abnormality, consider inferolateral ischemia  Abnormal ECG    Confirmed by Behuria, Supreeti ([id number]) on 5/30/2025 6:08:51 PM (05-30-25 @ 14:23)      MEDICATIONS  apixaban 2.5  aspirin  chewable 81  bisacodyl Suppository 10  cefTRIAXone   IVPB 1000  chlorhexidine 2% Cloths 1  doxycycline monohydrate Capsule 100  fluconAZOLE   Tablet 200  lactated ringers. 1000  metoprolol tartrate 12.5  senna 2  sertraline 50      WEIGHT  Weight (kg): 63.5 (05-30-25 @ 14:20)  Creatinine: 0.7 mg/dL (06-04-25 @ 06:00)      ANTIBIOTICS:  cefTRIAXone   IVPB 1000 milliGRAM(s) IV Intermittent every 24 hours  doxycycline monohydrate Capsule 100 milliGRAM(s) Oral every 12 hours  fluconAZOLE   Tablet 200 milliGRAM(s) Oral daily      All available historical records have been reviewed

## 2025-06-05 NOTE — SWALLOW BEDSIDE ASSESSMENT ADULT - SWALLOW EVAL: FUNCTIONAL LEVEL AT TIME OF EVAL
Obtunded, room air, cachectic, contracted.
Lethargic, room air, cachectic, contracted. Daughter at b/s.

## 2025-06-05 NOTE — PROGRESS NOTE ADULT - SUBJECTIVE AND OBJECTIVE BOX
INTERVAL HPI/OVERNIGHT EVENTS:  Patient was seen and examined at bedside. No o/n events, patient resting comfortably.     VITAL SIGNS:  T(F): 99.7 (06-05-25 @ 04:32)  HR: 63 (06-05-25 @ 04:32)  BP: 168/72 (06-05-25 @ 04:32)  RR: 18 (06-05-25 @ 04:32)  SpO2: 98% (06-05-25 @ 04:32)  Wt(kg): --    PHYSICAL EXAM:    Constitutional: WDWN, NAD  Respiratory: CTA b/l, decreased air entry b/l, no wheezing, no rhonchi, no rales, without accessory muscle use and no intercostal retractions  Cardiovascular: RRR, normal S1S2, no M/R/G  Gastrointestinal: soft, NTND, no masses palpable, BS normal  Extremities: Warm, well perfused, pulses equal bilateral upper and lower extremities, no edema, no clubbing  Skin: Normal temperature, warm, dry    MEDICATIONS  (STANDING):  apixaban 2.5 milliGRAM(s) Oral every 12 hours  aspirin  chewable 81 milliGRAM(s) Oral daily  bisacodyl 10 milliGRAM(s) Oral at bedtime  chlorhexidine 2% Cloths 1 Application(s) Topical daily  doxycycline monohydrate Capsule 100 milliGRAM(s) Oral every 12 hours  fluconAZOLE   Tablet 200 milliGRAM(s) Oral daily  levoFLOXacin  Tablet 750 milliGRAM(s) Oral every 24 hours  metoprolol tartrate 12.5 milliGRAM(s) Oral every 12 hours  sertraline 50 milliGRAM(s) Oral daily    MEDICATIONS  (PRN):  bisacodyl Suppository 10 milliGRAM(s) Rectal daily PRN Constipation  morphine Concentrate 5 milliGRAM(s) SubLingual every 4 hours PRN Severe Pain (7 - 10)      Allergies    No Known Allergies    Intolerances        LABS:                        9.6    10.96 )-----------( 152      ( 04 Jun 2025 06:00 )             30.6     06-04    144  |  107  |  23[H]  ----------------------------<  247[H]  3.9   |  24  |  0.7    Ca    9.1      04 Jun 2025 06:00  Mg     2.0     06-04        Urinalysis Basic - ( 04 Jun 2025 06:00 )    Color: x / Appearance: x / SG: x / pH: x  Gluc: 247 mg/dL / Ketone: x  / Bili: x / Urobili: x   Blood: x / Protein: x / Nitrite: x   Leuk Esterase: x / RBC: x / WBC x   Sq Epi: x / Non Sq Epi: x / Bacteria: x        RADIOLOGY & ADDITIONAL TESTS:  Reviewed

## 2025-06-05 NOTE — SWALLOW BEDSIDE ASSESSMENT ADULT - SLP PERTINENT HISTORY OF CURRENT PROBLEM
89-year-old male past medical history of cad/cabg, DVT, on Eliquis, s/p ppm, htn, hl, indwelling keller presenting to the ED for AMS.
89-year-old male past medical history of cad/cabg, dvt on elliquis, s/p ppm, htn, hl, indwelling keller presenting to the ED for AMS.
89-year-old male past medical history of cad/cabg, DVT, on Eliquis, s/p ppm, htn, hl, indwelling keller presenting to the ED for AMS.

## 2025-06-05 NOTE — SWALLOW BEDSIDE ASSESSMENT ADULT - SWALLOW EVAL: RECOMMENDED DIET
Comfort care diet of puree with thin liquids as accepted/tolerated, only when awake/alert
NPO with non oral means for nutrition/ hydration (if aligns with GOC)
puree foods and thin liquids via small bites and sips, 1:1

## 2025-06-05 NOTE — SWALLOW BEDSIDE ASSESSMENT ADULT - SWALLOW EVAL: PATIENT/FAMILY GOALS STATEMENT
Pt. will be d/c to [facility] facility for LTP and Hospice Care
Palliative care note: Pt's daughters decided on hospice at facility once patient is stable for discharge. Would like to continue with limited medical management at this time.

## 2025-06-05 NOTE — SWALLOW BEDSIDE ASSESSMENT ADULT - SWALLOW EVAL: RECOMMENDED FEEDING/EATING TECHNIQUES
allow for swallow between intakes/check mouth frequently for oral residue/pocketing/position upright (90 degrees)/small sips/bites
position upright (90 degrees)
position upright (90 degrees)

## 2025-06-05 NOTE — HOSPICE CARE NOTE - CONVESATION DETAILS
6/5- Call placed to patients daughter Khadijah who agrees to transfer to [facility] on Friday with a hospice admission visit on Monday morning at 9:30. Khadijah and her sister Maureen will be present for admission visit.

## 2025-06-05 NOTE — PROGRESS NOTE ADULT - ASSESSMENT
89-year-old man past medical history of cad/cabg, dvt on Eliquis s/p ppm, htn, hl, indwelling keller presenting to the ED for AMS.     # pt is immunocompromised 2/2 age; CAD    # family interested in hospice at SNF ([facility] or [facility])  hospice eval -> d/c planning  limit labs and studies - family agrees  d/c IVFs  c/w Morphine Conc Liq 5mg SL q4 prn pain or agitation and pre-med for daily care or BM care    # still febrile despite abx and afng  check RVP   tyl prn  claritin 10mg po q24 for congestion  morphine prn  given pt going on hospice, no need for blood or urine cx's  can f/u w/ ID    # AMS 2/2 Metab enceph 2/2 UTI 2/2 chr keller, in background of very severe dementia w/ functional quadriplegia  - per daughter Toña, pt has been having cognitive decline with behavioral changes (loss of appetite and activity) over the past several months  pt does not walk at all for several mo  he is completely dependent for all ADLs, including feeding  he only speaks a couple of words now  he has almost no interaction w/ anyone at home  chronic keller changed Friday 5/23 at urology office and changed on this admit  positive UA on admission  CT A/P: stool impaction, no hydro  CTH: no acute findings  blood cx: neg  urine culture: candida  afng: Diflucan 400mg po x1, then 200mg po q24 x6 (1 wk tx total - end 6/9)    # 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; chr OM; PAD  podiatry eval noted  /CRP 88.5  LE arterial doppler - mod sten Rt SFA; Lt PT Art Occ - family does NOT want ANY vasc intervention  R foot xray: degen changes; vasc calcif; s/p 1st toe amp; cystic change/erosion deep to ST swelling at medial 1st MT head (r/o OM)  ID f/u  palliative abx: levoflox 750mg po q24 + doxy 100mg po q12 til 6/19  no MRI needed (going to hospice)  local wound care to rt foot    # constipation  CT A/P: stool impaction noted  bowel reg: senna 2 qhs (d/c miralax - cannot anne that amt of liquid)  dulcolax supp pr q24 prn  mineral oil enema x1 again  min stool on manual disimpaction by residents    # Failure to thrive  hospice eval  pt does drink a little  family does not want NGT or PEG    # Troponinemia (resolving); CAD s/p CABG; Hx DVT on Eliquis; Hx of HTN/HLD; Hx pacemaker  - Trop 68 -> 63  - On HIE echo in 2022: EF 71%, Grade II diastolic dysfunction  - c/w aspirin 81mg q24  - c/w Eliquis 2.5mg BID   d/c Imdur & Ranexa (cannot be crushed)  metoprolol tart 12.5mg po q12  above can be altered once on hospice at SNF  per EP, can follow up outpatient for routine monitoring of PPM    # DM2  diabetic dash diet  can d/c FS since going on hospice    # BPH  d/c finasteride  has keller - chronic (changed on this admit)    # Anxiety/Depression  c/w Zoloft 50 q24    # DVT ppx: Eliquis    # Activity: pt does NOT walk at baseline w/ rt leg contracture; no PT eval    # DNR/DNI    # updated dtr at bedside     Dispo: f/u ID re abx/afng; RVP panel; f/u Pod prn re DFU; hospice eval; d/c FS/labs/studies; c/w keller; tx BM;   eventually, pt will go on hospice to SNF ([facility]) - f/u CM/hospice - since going on hospice, can d/c when bed avail    Long term prog is extremely poor. Life expectancy is < 6 mo.

## 2025-06-05 NOTE — SWALLOW BEDSIDE ASSESSMENT ADULT - COMMENTS
RN and PCA reported ongoing lethargy and unable to feed meals.   SLP history:   6/1/25: Dysphagia eval completed. Recs for puree w/thin liquids.
RN and PCA reported ongoing lethargy and unable to feed meals.   SLP history:   6/1/25: Dysphagia eval completed. Recs for puree w/thin liquids.
- Positive UA on admission ISO chronic keller and decreased output

## 2025-06-05 NOTE — SWALLOW BEDSIDE ASSESSMENT ADULT - SWALLOW EVAL: DIAGNOSIS
moderate to severe oral impairment no overt symptoms of laryngeal penetration/aspiration for puree foods and thin liquids via small sips
Toleration of min trials of thin liquids via straw sips. Further trials not assessed.
Pt received obtunded, not appropriate for PO trials at this time 2/2 aspiration risk.

## 2025-06-05 NOTE — PROGRESS NOTE ADULT - SUBJECTIVE AND OBJECTIVE BOX
JUNO HEART  89y  Male  ***My note supersedes ALL resident notes that I sign.  My corrections for their notes are in my note.***    I can be reached directly via Teams or my office number is [phone] or [phone].    INTERVAL EVENTS: Here for f/u of 1st toe infection. Family c/o upper airway congestion and cough. Pt is restless at times, but responds well (too well to family) to morphine. Pt still w/ fever (101.8) today, even on abx.    T(F): 99.7 (06-05-25 @ 04:32), Max: 99.7 (06-05-25 @ 04:32)  HR: 63 (06-05-25 @ 04:32) (63 - 80)  BP: 168/72 (06-05-25 @ 04:32) (139/60 - 169/78)  RR: 18 (06-05-25 @ 04:32) (18 - 18)  SpO2: 98% (06-05-25 @ 04:32) (97% - 98%)    06-04-25 @ 07:01  -  06-05-25 @ 07:00  --------------------------------------------------------  IN: 0 mL / OUT: 750 mL / NET: -750 mL    Gen: NAD; not interactive; looks debilitated; little restless  HEENT: PERRL, nose runny clr d/c  Neck: no nodes, no JVD, thyroid nl  lungs: clr  hrt: s1 s2 rrr no murmur  abd: soft, NT/ND, no HS megaly  : + keller - clr, yel urine; no ppt  ext: no edema, no c/c; rt leg in full flex contracture at knee (cannot extend leg); + pain w/ moving either leg  neuro: somewhat awake; not alert, not oriented, can move arms - has decent str;     LABS:                      9.6     (    93.9   10.96 )-----------( ---------      152      ( 04 Jun 2025 06:00 )             30.6    (    15.7     Urinalysis Basic - ( 04 Jun 2025 06:00 )    Color: x / Appearance: x / SG: x / pH: x  Gluc: 247 mg/dL / Ketone: x  / Bili: x / Urobili: x   Blood: x / Protein: x / Nitrite: x   Leuk Esterase: x / RBC: x / WBC x   Sq Epi: x / Non Sq Epi: x / Bacteria: x    RADIOLOGY & ADDITIONAL TESTS:    MEDICATIONS:  doxycycline monohydrate Capsule 100 milliGRAM(s) Oral every 12 hours  fluconAZOLE   Tablet 200 milliGRAM(s) Oral daily  levoFLOXacin  Tablet 750 milliGRAM(s) Oral every 24 hours    apixaban 2.5 milliGRAM(s) Oral every 12 hours  aspirin  chewable 81 milliGRAM(s) Oral daily  bisacodyl 10 milliGRAM(s) Oral at bedtime  bisacodyl Suppository 10 milliGRAM(s) Rectal daily PRN  chlorhexidine 2% Cloths 1 Application(s) Topical daily  metoprolol tartrate 12.5 milliGRAM(s) Oral every 12 hours  morphine Concentrate 5 milliGRAM(s) SubLingual every 4 hours PRN  sertraline 50 milliGRAM(s) Oral daily

## 2025-06-05 NOTE — SWALLOW BEDSIDE ASSESSMENT ADULT - NS ASR SWALLOW FINDINGS DISCUS
dtr/Physician/Nursing/Family
Hide Include Location In Plan Question?: No
Detail Level: Zone
RN and MD made aware/Physician/Nursing
RN and MD made aware/Physician/Nursing

## 2025-06-05 NOTE — PROGRESS NOTE ADULT - ASSESSMENT
#AMS/TME 2/2 UTI  - per daughter Toña, pt has been having cognitive decline with behavioral changes (loss of appetite and activity) over the past several months  - chronic keller changes last Friday 5/23 at urology office  - positive UA on admission  - CT A/P: stool impaction, no hydro  - CTH: no acute findings  - blood negative  - urine culture gorwing 10K-49K candida albicans  - ID eval:  PO levaquin 750mg daily + PO doxy 100mg BID x 21 days E/D 6/19, Pending clinical course may need to prolong    #R foot infx s/p amputation  - podiatry eval noted, no plan for surgical intervention  - ESR/CRP: 127/88.5  - LE arterial doppler 6/2: Moderate stenosis of the right superficial femoral artery. 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  - R foot xray 6/1: Post amputation of the first toe, with soft tissue swelling at the   amputation bed. Cystic change/erosion deep to the area of soft tissue   swelling at the medial aspect of the first metatarsal head. Osteomyelitis   is not excluded. Recommend MRI of the right forefoot for further   evaluation.  - no plan for MRI  - added sl morphine concentrate 5 mg q4h prn pain or agitation    #constipation  - CT A/P: stool impaction noted  - miralax and senna qhs    #Failure to thrive  - IV fluid 2/2 decreased po intake  - Now DNR/DNI per family wishes with hospice referral  - Dietary consulted for recs regarding nutrition  - PT eval unable to assess given the patient advanced dementia and immobility  - palliative eval regarding daughter Toña interest in discussing hospice and next steps with patient's overall decline in the past few months    #Constipation  - on dulcolax suppository daily prn  - added dulcolax 10 mg tab qhs  - s/p mineral oil enema x2    #Troponinemia (resolving)  #CAD s/p CABG  #Hx DVT on Eliquis  #Hx of HTN/HLD  #Hx pacemaker  - Trop 68>63  - On HIE echo in 2022: EF 71%, Grade II diastolic dysfunction  - C/w aspirin 81mg qD  - C/w Eliquis 2.5mg BID   - stopped Imdur, Ranexa  - on metoprolol tartrate 12.5 mg bid for HTN  - per EP, can follow up outpatient for routine monitoring of PPM      #DM2  - monitor FS    #BPH  - c/w finasteride    #Anxiety/Depression  - c/w Zoloft    DVT ppx: Eliquis  DNR/DNI

## 2025-06-06 NOTE — PROGRESS NOTE ADULT - SUBJECTIVE AND OBJECTIVE BOX
[employer]  89y  Male  ***My note supersedes ALL resident notes that I sign.  My corrections for their notes are in my note.***    I can be reached directly via Teams or my office number is [phone] or [phone].    INTERVAL EVENTS: Here for f/u of fever. Pt was more unresp this AM and eventually  (expected).    T(F): 101.9 (25 @ 03:59), Max: 102.1 (25 @ 18:14)  HR: 109 (25 @ 03:59) (84 - 109)  BP: 149/83 (25 @ 03:59) (135/79 - 149/83)  RR: 18 (25 @ 03:59) (18 - 18)  SpO2: 96% (25 @ 03:59) (94% - 96%)    25 @ 07:01  -  25 @ 07:00  --------------------------------------------------------  IN: 0 mL / OUT: 1100 mL / NET: -1100 mL    Gen: unrespo  HEENT: pupils dilated and not resp  lungs: breathless  hrt: pulseless  : + keller  neuro: unresp    LABS:    RADIOLOGY & ADDITIONAL TESTS:      MEDICATIONS:  doxycycline monohydrate Capsule 100 milliGRAM(s) Oral every 12 hours  fluconAZOLE   Tablet 200 milliGRAM(s) Oral daily  levoFLOXacin  Tablet 750 milliGRAM(s) Oral every 24 hours    apixaban 2.5 milliGRAM(s) Oral every 12 hours  aspirin  chewable 81 milliGRAM(s) Oral daily  bisacodyl 10 milliGRAM(s) Oral at bedtime  bisacodyl Suppository 10 milliGRAM(s) Rectal daily PRN  chlorhexidine 2% Cloths 1 Application(s) Topical daily  metoprolol tartrate 12.5 milliGRAM(s) Oral every 12 hours  morphine Concentrate 5 milliGRAM(s) SubLingual every 4 hours PRN  sertraline 50 milliGRAM(s) Oral daily

## 2025-06-06 NOTE — PROGRESS NOTE ADULT - ASSESSMENT
89-year-old man past medical history of cad/cabg, dvt on Eliquis s/p ppm, htn, hl, indwelling keller presenting to the ED for AMS.     # pt is immunocompromised 2/2 age; CAD    # family interested in hospice at SNF ([facility]); however pt  today    # still febrile despite abx and afng  RVP: enterorhino virus detected  tyl prn  claritin 10mg po q24 for congestion  morphine prn    # AMS 2/2 Metab enceph 2/2 UTI 2/2 chr keller, in background of very severe dementia w/ functional quadriplegia  per daughter Toña, pt has been having cognitive decline with behavioral changes (loss of appetite and activity) over the past several months  pt does not walk at all for several mo  he is completely dependent for all ADLs, including feeding  he only speaks a couple of words now  he has almost no interaction w/ anyone at home  chronic keller changed  at urology office and changed on this admit  positive UA on admission  CT A/P: stool impaction, no hydro  CTH: no acute findings  blood cx: neg  urine culture: candida  afng: Diflucan 400mg po x1, then 200mg po q24 x6 (1 wk tx total - end )    # 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; chr OM; PAD  podiatry eval noted  /CRP 88.5  LE arterial doppler - mod sten Rt SFA; Lt PT Art Occ - family does NOT want ANY vasc intervention  R foot xray: degen changes; vasc calcif; s/p 1st toe amp; cystic change/erosion deep to ST swelling at medial 1st MT head (r/o OM)  ID f/u  palliative abx: levoflox 750mg po q24 + doxy 100mg po q12 til   no MRI needed (going to hospice)  local wound care to rt foot    # constipation  CT A/P: stool impaction noted  bowel reg: senna 2 qhs   dulcolax supp pr q24 prn  min stool on manual disimpaction by residents    # Failure to thrive  family does not want NGT or PEG    # Troponinemia (resolving); CAD s/p CABG; Hx DVT on Eliquis; Hx of HTN/HLD; Hx pacemaker  - Trop 68 -> 63  - On HIE echo in : EF 71%, Grade II diastolic dysfunction  aspirin 81mg q24  Eliquis 2.5mg BID   d/c Imdur & Ranexa (cannot be crushed)  metoprolol tart 12.5mg po q12    # DM2  diabetic dash diet  d/c FS     # BPH  d/c finasteride  has keller - chronic (changed on this admit)    # Anxiety/Depression  Zoloft 50 q24    # DVT ppx: Eliquis    # Activity: pt does NOT walk at baseline w/ rt leg contracture; no PT eval    # DNR/DNI - pt  today    # updated dtr at bedside - provided condolences and support    Dispo: expiration (expected)

## 2025-06-06 NOTE — PROGRESS NOTE ADULT - NUTRITIONAL ASSESSMENT
This patient has been assessed with a concern for Malnutrition and has been determined to have a diagnosis/diagnoses of Severe protein-calorie malnutrition.    The following pending diet order is being considered for treatment of Severe protein-calorie malnutrition:  Diet Pureed-  Supplement Feeding Modality:  Oral  Glucerna Shake Cans or Servings Per Day:  1       Frequency:  Daily  Ensure Max Cans or Servings Per Day:  1       Frequency:  Daily  Entered: Jun 1 2025  9:40PM  
This patient has been assessed with a concern for Malnutrition and has been determined to have a diagnosis/diagnoses of Severe protein-calorie malnutrition.    This patient is being managed with:   Diet Pureed-  Supplement Feeding Modality:  Oral  Glucerna Shake Cans or Servings Per Day:  1       Frequency:  Daily  Ensure Max Cans or Servings Per Day:  1       Frequency:  Daily  Entered: Jun 1 2025  9:40PM  
This patient has been assessed with a concern for Malnutrition and has been determined to have a diagnosis/diagnoses of Severe protein-calorie malnutrition.    This patient is being managed with:   Diet Pureed-  DASH/TLC {Sodium & Cholesterol Restricted} (DASH)  Entered: Jun 2 2025  8:38AM    The following pending diet order is being considered for treatment of Severe protein-calorie malnutrition:  Diet Pureed-  Supplement Feeding Modality:  Oral  Glucerna Shake Cans or Servings Per Day:  1       Frequency:  Daily  Ensure Max Cans or Servings Per Day:  1       Frequency:  Daily  Entered: Jun 1 2025  9:40PM  
This patient has been assessed with a concern for Malnutrition and has been determined to have a diagnosis/diagnoses of Severe protein-calorie malnutrition.    This patient is being managed with:   Diet Pureed-  DASH/TLC {Sodium & Cholesterol Restricted} (DASH)  Entered: Jun 2 2025  8:38AM    The following pending diet order is being considered for treatment of Severe protein-calorie malnutrition:  Diet Pureed-  Supplement Feeding Modality:  Oral  Glucerna Shake Cans or Servings Per Day:  1       Frequency:  Daily  Ensure Max Cans or Servings Per Day:  1       Frequency:  Daily  Entered: Jun 1 2025  9:40PM  
This patient has been assessed with a concern for Malnutrition and has been determined to have a diagnosis/diagnoses of Severe protein-calorie malnutrition.    This patient is being managed with:   Diet Pureed-  Supplement Feeding Modality:  Oral  Glucerna Shake Cans or Servings Per Day:  1       Frequency:  Daily  Ensure Max Cans or Servings Per Day:  1       Frequency:  Daily  Entered: Jun 1 2025  9:40PM  
This patient has been assessed with a concern for Malnutrition and has been determined to have a diagnosis/diagnoses of Severe protein-calorie malnutrition.    This patient is being managed with:   Diet NPO-  Except Medications  Entered: Jun 2 2025  5:59PM    The following pending diet order is being considered for treatment of Severe protein-calorie malnutrition:  Diet Pureed-  Supplement Feeding Modality:  Oral  Glucerna Shake Cans or Servings Per Day:  1       Frequency:  Daily  Ensure Max Cans or Servings Per Day:  1       Frequency:  Daily  Entered: Jun 1 2025  9:40PM  
This patient has been assessed with a concern for Malnutrition and has been determined to have a diagnosis/diagnoses of Severe protein-calorie malnutrition.    This patient is being managed with:   Diet Pureed-  Supplement Feeding Modality:  Oral  Glucerna Shake Cans or Servings Per Day:  1       Frequency:  Daily  Ensure Max Cans or Servings Per Day:  1       Frequency:  Daily  Entered: Jun 1 2025  9:40PM  
This patient has been assessed with a concern for Malnutrition and has been determined to have a diagnosis/diagnoses of Severe protein-calorie malnutrition.    This patient is being managed with:   Diet Pureed-  Supplement Feeding Modality:  Oral  Glucerna Shake Cans or Servings Per Day:  1       Frequency:  Daily  Ensure Max Cans or Servings Per Day:  1       Frequency:  Daily  Entered: Jun 1 2025  9:40PM

## 2025-06-06 NOTE — PROGRESS NOTE ADULT - PROVIDER SPECIALTY LIST ADULT
Internal Medicine
Podiatry
Hospitalist
Hospitalist
Internal Medicine
Infectious Disease
Internal Medicine
Infectious Disease
Palliative Care
Internal Medicine
Internal Medicine

## 2025-06-06 NOTE — DISCHARGE NOTE FOR THE EXPIRED PATIENT - HOSPITAL COURSE
89-year-old male past medical history of cad/cabg, dvt on elliquis, s/p ppm, htn, hl, indwelling keller presenting to the ED for AMS.  Daughter is at bedside.  State for the past few days patient has been more altered than usual.  Patient has decreased p.o. intake and decreased Keller output.  No nausea, vomiting, fevers, trouble breathing.  Daughters state that patient was recently seen at his urologist office and a urine was sent.  Keller was last changed on Friday 5/23.  Daughters requested  medical management but state no heroic measures and made patient DNR/DNI.  In the ED: T 37,3, HR 72, /67, RR 18 SpO2 100%  Hgb 9.9 (baseline 10s in 2024), MCV 94.1  UA is grossly positive for bacteria, yeasts, and casts  Trop 68>63, BNP 2042  CT abdomen pelvis only positive for stool impaction  CT Head showed no intracranial pathology    Patient admitted to medicine for workup of AMS 2/2 metabolic encephalopathy from suspected UTI and troponinemia.  #family interested in hospice at [facility]  - [facility] discussed on admission and patient was made DNR/DNI  - Family interested in hospice, planning was under way for hospice referral but patient passed away on 6/6/2025  - Morphine Conc Liq 5mg SL q4 prn was added for pain/agitation    # AMS 2/2 Metab enceph 2/2 UTI 2/2 chr keller, in background of very severe dementia  - per daughter Toña, pt has been having cognitive decline with behavioral changes (loss of appetite and activity) over the past several months  he has almost no interaction w/ anyone at home  chronic keller changes last Friday 5/23 at urology office  UA positive with UCx growing candida albicans and negative blood Cx, was started on Diflucan 400mg po x1, then 200mg po q24 x6 (1 wk tx total - end 6/9)  CT A/P: stool impaction, no hydro  CTH: no acute findings  on 6/6 patient started to develop high grade fever in addition to rhinorrhea, RVP taken and was positive for entero/rhinovirus    # 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; chr OM; PAD  seen by podiatry during this admission and no plan for surgical intervention was made  /CRP 88.5  LE arterial doppler - mod sten Rt SFA; Lt PT Art Occ - family does NOT want ANY vasc intervention  R foot xray: degen changes; vasc calcif; s/p 1st toe amp; cystic change/erosion deep to ST swelling at medial 1st MT head (r/o OM)  was started on abx: rocephin 1gm iv q24 + doxy 100mg po q12 then ID recommended to change to PO levaquin 750mg daily + PO doxy 100mg BID x 21 days E/D 6/19, Pending clinical course may need to prolong  local wound care to rt foot was provided    # constipation  CT A/P: stool impaction noted  bowel reg: miralax q12 and senna 2 qhs  added dulcolax suppository  patient was passing minimal amount of stool despite the above regimen in addition to 10 mg po dulcolax qhs and mineral oil enema x2 and manual disimpaction    # Failure to thrive  family does not want NGT or PEG    # Troponinemia (resolving); CAD s/p CABG; Hx DVT on Eliquis; Hx of HTN/HLD; Hx pacemaker  - Trop 68 -> 63  - On HIE echo in 2022: EF 71%, Grade II diastolic dysfunction  - was on aspirin 81mg qD and Eliquis 2.5mg BID   - stopped Imdur, Ranexa and added Toprol XL 25mg po q24 then switched to metoprolol tartrate 12.5 mg BID as patient taking crushed meds   - has PPM for which EP said that he can follow up as OP after discharge    # DM2  diabetic dash diet with monitoring FS    # BPH  off finasteride  chronic keller (changed on this admit)    # Anxiety/Depression  was on Zoloft 50 q24    On 6/6, patient was noted to be pulseless and was unable to have a detectable BP. Pulse check done and was absent. Code status at the time was DNR/DNI. Family at bedside and made aware.

## 2025-06-08 LAB
CULTURE RESULTS: SIGNIFICANT CHANGE UP
SPECIMEN SOURCE: SIGNIFICANT CHANGE UP

## 2025-06-10 ENCOUNTER — APPOINTMENT (OUTPATIENT)
Dept: UROLOGY | Facility: CLINIC | Age: 89
End: 2025-06-10

## 2025-06-12 ENCOUNTER — RX RENEWAL (OUTPATIENT)
Age: 89
End: 2025-06-12

## 2025-06-17 DIAGNOSIS — F03.C4 UNSPECIFIED DEMENTIA, SEVERE, WITH ANXIETY: ICD-10-CM

## 2025-06-17 DIAGNOSIS — G92.8 OTHER TOXIC ENCEPHALOPATHY: ICD-10-CM

## 2025-06-17 DIAGNOSIS — Z95.1 PRESENCE OF AORTOCORONARY BYPASS GRAFT: ICD-10-CM

## 2025-06-17 DIAGNOSIS — Z66 DO NOT RESUSCITATE: ICD-10-CM

## 2025-06-17 DIAGNOSIS — Z86.718 PERSONAL HISTORY OF OTHER VENOUS THROMBOSIS AND EMBOLISM: ICD-10-CM

## 2025-06-17 DIAGNOSIS — N39.0 URINARY TRACT INFECTION, SITE NOT SPECIFIED: ICD-10-CM

## 2025-06-17 DIAGNOSIS — I10 ESSENTIAL (PRIMARY) HYPERTENSION: ICD-10-CM

## 2025-06-17 DIAGNOSIS — Z79.899 OTHER LONG TERM (CURRENT) DRUG THERAPY: ICD-10-CM

## 2025-06-17 DIAGNOSIS — R53.2 FUNCTIONAL QUADRIPLEGIA: ICD-10-CM

## 2025-06-17 DIAGNOSIS — T87.53 NECROSIS OF AMPUTATION STUMP, RIGHT LOWER EXTREMITY: ICD-10-CM

## 2025-06-17 DIAGNOSIS — N40.1 BENIGN PROSTATIC HYPERPLASIA WITH LOWER URINARY TRACT SYMPTOMS: ICD-10-CM

## 2025-06-17 DIAGNOSIS — E11.69 TYPE 2 DIABETES MELLITUS WITH OTHER SPECIFIED COMPLICATION: ICD-10-CM

## 2025-06-17 DIAGNOSIS — E11.51 TYPE 2 DIABETES MELLITUS WITH DIABETIC PERIPHERAL ANGIOPATHY WITHOUT GANGRENE: ICD-10-CM

## 2025-06-17 DIAGNOSIS — F03.C3 UNSPECIFIED DEMENTIA, SEVERE, WITH MOOD DISTURBANCE: ICD-10-CM

## 2025-06-17 DIAGNOSIS — K59.00 CONSTIPATION, UNSPECIFIED: ICD-10-CM

## 2025-06-17 DIAGNOSIS — L89.620 PRESSURE ULCER OF LEFT HEEL, UNSTAGEABLE: ICD-10-CM

## 2025-06-17 DIAGNOSIS — D84.81 IMMUNODEFICIENCY DUE TO CONDITIONS CLASSIFIED ELSEWHERE: ICD-10-CM

## 2025-06-17 DIAGNOSIS — Z79.01 LONG TERM (CURRENT) USE OF ANTICOAGULANTS: ICD-10-CM

## 2025-06-17 DIAGNOSIS — R62.7 ADULT FAILURE TO THRIVE: ICD-10-CM

## 2025-06-17 DIAGNOSIS — Z95.0 PRESENCE OF CARDIAC PACEMAKER: ICD-10-CM

## 2025-06-17 DIAGNOSIS — T87.81 DEHISCENCE OF AMPUTATION STUMP: ICD-10-CM

## 2025-06-17 DIAGNOSIS — E43 UNSPECIFIED SEVERE PROTEIN-CALORIE MALNUTRITION: ICD-10-CM

## 2025-06-17 DIAGNOSIS — Z79.82 LONG TERM (CURRENT) USE OF ASPIRIN: ICD-10-CM

## 2025-06-17 DIAGNOSIS — E78.5 HYPERLIPIDEMIA, UNSPECIFIED: ICD-10-CM

## 2025-06-17 DIAGNOSIS — F03.C18 UNSPECIFIED DEMENTIA, SEVERE, WITH OTHER BEHAVIORAL DISTURBANCE: ICD-10-CM

## 2025-06-17 DIAGNOSIS — I25.10 ATHEROSCLEROTIC HEART DISEASE OF NATIVE CORONARY ARTERY WITHOUT ANGINA PECTORIS: ICD-10-CM

## 2025-06-17 DIAGNOSIS — M86.671 OTHER CHRONIC OSTEOMYELITIS, RIGHT ANKLE AND FOOT: ICD-10-CM

## 2025-06-17 DIAGNOSIS — T83.511A INFECTION AND INFLAMMATORY REACTION DUE TO INDWELLING URETHRAL CATHETER, INITIAL ENCOUNTER: ICD-10-CM

## 2025-06-26 ENCOUNTER — APPOINTMENT (OUTPATIENT)
Dept: CARDIOLOGY | Facility: CLINIC | Age: 89
End: 2025-06-26